# Patient Record
Sex: FEMALE | Race: WHITE | NOT HISPANIC OR LATINO | Employment: OTHER | URBAN - METROPOLITAN AREA
[De-identification: names, ages, dates, MRNs, and addresses within clinical notes are randomized per-mention and may not be internally consistent; named-entity substitution may affect disease eponyms.]

---

## 2017-08-17 ENCOUNTER — CONSULT (OUTPATIENT)
Dept: ONCOLOGY | Facility: CLINIC | Age: 62
End: 2017-08-17

## 2017-08-17 VITALS
DIASTOLIC BLOOD PRESSURE: 67 MMHG | HEART RATE: 81 BPM | BODY MASS INDEX: 25.32 KG/M2 | HEIGHT: 60 IN | WEIGHT: 129 LBS | SYSTOLIC BLOOD PRESSURE: 134 MMHG | TEMPERATURE: 97.2 F | RESPIRATION RATE: 16 BRPM

## 2017-08-17 DIAGNOSIS — R79.83 HOMOCYSTEINEMIA: Primary | ICD-10-CM

## 2017-08-17 PROCEDURE — 99204 OFFICE O/P NEW MOD 45 MIN: CPT | Performed by: INTERNAL MEDICINE

## 2017-08-17 RX ORDER — CLONAZEPAM 0.5 MG/1
TABLET ORAL
Refills: 5 | COMMUNITY
Start: 2017-06-07 | End: 2019-05-13

## 2017-08-17 RX ORDER — CALCIUM POLYCARBOPHIL 625 MG 625 MG/1
625 TABLET ORAL DAILY
COMMUNITY
End: 2020-10-14 | Stop reason: HOSPADM

## 2017-08-17 RX ORDER — GABAPENTIN 600 MG/1
600 TABLET ORAL 3 TIMES DAILY
COMMUNITY
End: 2019-05-13

## 2017-08-17 RX ORDER — POLYETHYLENE GLYCOL 3350 17 G/17G
17 POWDER, FOR SOLUTION ORAL DAILY
COMMUNITY

## 2017-08-17 NOTE — PROGRESS NOTES
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  The patient has absolutely no personal history or family history of clotting disorders.  Her homocystine level and her empty HFR mutation were identified several years ago because her daughter was tested and had an MT HFR mutation.  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  She was normal regarding C6 7 7T.  Interpretation by the laboratory with this was performed reinforced the fact that this was not associated with tendency for premature coronary disease or venous thrombosis.  As mentioned patient has never personally experienced any cardiac or peripheral vascular events.  She has no history of venous thrombosis.  Her homocystine level has been elevated.  This was first identified within the past 2 years and she was placed on folic acid.  She was recently switched to an analogue of folic acid,.  She does not have any family history of premature coronary disease stroke or venous thrombosis.  She would like a formal opinion regarding the wrist to her of her elevated homocystine level.  The last time her homocystine level was checked it was 16.9.  That was on May 15, 2017.    Past Medical History:   Diagnosis Date   • Arthritis    • Heart murmur        No current outpatient prescriptions on file prior to visit.     No current facility-administered medications on file prior to visit.      Her current medications include Neurontin 600 mg 3 times daily,    Deplin, 15 mg daily    Chelated  zinc plus copper 15 mg daily, FiberCon daily, Caltrate with vitamin D daily, clonazepam when necessary, MiraLAX and Konsul -dose adjusted      No Known Allergies    Past Surgical History:   Procedure Laterality Date   •   SECTION      x3; , , &    • COLONOSCOPY  2012   • HIP ARTHROPLASTY Right 2016   • TONSILLECTOMY  89       OB History   No data available       Social History     Social History   • Marital status:      Spouse name: N/A   • Number of children: N/A   • Years of education: N/A     Social History Main Topics   • Smoking status: Former Smoker     Packs/day: 0.50     Types: Cigarettes     Quit date:    • Smokeless tobacco: Never Used   • Alcohol use 0.6 oz/week     1 Glasses of wine per week   • Drug use: No   • Sexual activity: Defer     Other Topics Concern   • None     Social History Narrative   • None       Family History   Problem Relation Age of Onset   • Hypertension Mother    • Hypertension Father    • Cancer Father      Bladder   • Colon polyps Father    • Breast cancer Paternal Grandmother        REVIEW OF SYSTEMS:  Review of Systems   Constitutional: Negative for activity change and appetite change.        Patient describes her overall health as good.  She is very physically active.  She walks regularly.  She has been voluntarily losing some weight over the last several months.  She underwent a right hip replacement within the past year and is delighted with results.   HENT: Negative for mouth sores, sinus pressure and voice change.         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   Eyes: Negative for visual disturbance.   Respiratory: Negative for shortness of breath.    Cardiovascular: Negative for chest pain.   Gastrointestinal: Negative for abdominal pain and vomiting.   Genitourinary: Negative for dysuria.   Musculoskeletal: Negative for arthralgias and myalgias.        Right hip replacement as mentioned above   Skin: Negative for color change.   Neurological: Negative for dizziness, syncope and headaches.   Hematological:  "Negative for adenopathy.   Psychiatric/Behavioral: Negative for confusion, sleep disturbance and suicidal ideas. The patient is not nervous/anxious.        .  /67 Comment: PARDEEPE  Pulse 81  Temp 97.2 °F (36.2 °C) (Temporal Artery )   Resp 16  Ht 60\" (152.4 cm)  Wt 129 lb (58.5 kg)  BMI 25.19 kg/m2    Physical  Exam:  Physical Exam   Constitutional: She is oriented to person, place, and time. She appears well-developed and well-nourished. No distress.   Useful vigorous and healthy in appearance   HENT:   Head: Normocephalic.   Mouth/Throat: Oropharynx is clear and moist.   Eyes: Conjunctivae and EOM are normal. No scleral icterus.   Neck: Normal range of motion. Neck supple. No thyromegaly present.   Cardiovascular: Normal rate, regular rhythm and normal heart sounds.    No murmur heard.  Pulmonary/Chest: Effort normal and breath sounds normal. She has no wheezes. She has no rales.   Abdominal: Soft. Bowel sounds are normal. She exhibits no distension and no mass. There is no tenderness.   Musculoskeletal: She exhibits no edema or tenderness.   Lymphadenopathy:     She has no cervical adenopathy.   Neurological: She is alert and oriented to person, place, and time. She displays normal reflexes. No cranial nerve deficit.   Skin: Skin is warm and dry. No rash noted.   Psychiatric: She has a normal mood and affect. Judgment normal.   Vitals reviewed.        Performance Status:     Assessment/Plan     Homocystinemia.  I carefully and clearly indicated the patient that as an isolated phenomenon, this does not increase her likelihood of premature coronary disease stroke or venous thrombosis.  Homocystine levels that are elevated appear to be a marker or at least associated to some degree with patients presenting with the above but certainly do not in a number of clinical studies represent an independent risk factor for development of these issues.  Furthermore, there is absolutely no evidence that currently exist " that intervening with agents like folic acid and decreasing the homocystine level really has any relevant outcome on patients with the above.    I told her that I thought she was entirely healthy and that her genetic mutation and her homocystinemia were not a risk for her.  Despite this, she has taken folic acid for several years and I told her to go ahead and continue it.  She asked me about taking a baby aspirin once a day.  Again I can't say that that would be beneficial for her since there never really been studies that indicate a clear benefit for women who take aspirin but I also told her that I did not see a reason that she could not take a baby aspirin once a day.  She does bruise somewhat easily although not inordinately.  She never has any spontaneous bleeding.  I told her that if she seems to bruise a lot more on the baby aspirin she could call me.  It is a cosmetic result of taking aspirin therapy and I would not discontinue it unless she really found it  unsightly or otherwise bothersome.  I told her that although I'm not a physician that strongly recommends a number of supplements in an otherwise healthy individual, taking a low-dose of aspirin was something I felt most people should do unless they have a strong reason not to.          The patient also asked me about genetic testing.  She was BRCA tested about 10 years ago.  There is a history of breast cancer in her paternal grandmother that was postmenopausal.  There is apparently a history of male breast cancer in that portion of the family as well.  I don't think there are any other individuals who have breast cancer or ovarian cancer or colon cancer.  For the most part, her ancestors have been somewhat long-lived.  On the other hand she is Ashkenazi Faith and I would like input from our genetic counselors.  I will make that referral.  I otherwise asked her to return here on a when necessary basis.           Leonid Estrada,  MD    8/17/2017

## 2017-08-21 DIAGNOSIS — Z80.3 FAMILY HISTORY OF BREAST CANCER: Primary | ICD-10-CM

## 2017-09-28 ENCOUNTER — CLINICAL SUPPORT (OUTPATIENT)
Dept: GENETICS | Facility: HOSPITAL | Age: 62
End: 2017-09-28

## 2017-09-28 ENCOUNTER — LAB (OUTPATIENT)
Dept: LAB | Facility: HOSPITAL | Age: 62
End: 2017-09-28

## 2017-09-28 DIAGNOSIS — IMO0001: ICD-10-CM

## 2017-09-28 DIAGNOSIS — Z83.71 FAMILY HISTORY OF COLONIC POLYPS: ICD-10-CM

## 2017-09-28 DIAGNOSIS — Z13.79 GENETIC TESTING: Primary | ICD-10-CM

## 2017-09-28 DIAGNOSIS — Z80.3 FAMILY HISTORY OF BREAST CANCER: Primary | ICD-10-CM

## 2017-10-01 NOTE — PROGRESS NOTES
Gricelda Matos is a 62 year old female who was referred for genetic counseling due to a family history of breast cancer and Ashkenazi Adventist ancestry. Ms. Matos has no personal history of cancer. She was 13 years old at menarche and had her first child at age 26. She is postmenopausal and retains her uterus and ovaries. Her current cancer screening includes annual clinical breast exam, annual mammogram, and colonoscopy every 5 years. She was interested in discussing her risk for a hereditary cancer syndrome.  Ms. Matos was interested in pursuing a multigene panel, and therefore the CancerNext panel was ordered through [employer] which analyzes 34 genes associated with an increased cancer risk. The genes on this panel include APC, AVERY, BARD1, BMPR1A, BRCA1, BRCA2, BRIP1, CDH1, CDK4, CDKN2A, CHEK2, DICER1, EPCAM, GREM1, HOXB13, MLH1, MRE11A, MSH2, MSH6, MUTYH, NBN, NF1, PALB2, PMS2, POLD1, POLE, PTEN, RAD50, RAD51C, RAD51D, SMAD4, SMARCA4, STK11, and TP53. Results from this testing are expected in approximately 2-3 weeks.    FAMILY HISTORY (see attached pedigree):   Father: Colon polyps & colon resection, 72; Bladder cancer, 82  Pat. Grandmother: Breast cancer, 50s  Mat. Uncle: Bladder cancer  Mat. Grandmother: Lymphoma, 94    We do not have medical records confirming the diagnoses in Ms. Matos’s family.    RISK ASSESSMENT:  Ms. Matos’s family history of breast cancer and Ashkenazi Adventist ancestry led to concern regarding a hereditary cancer syndrome.  She meets NCCN guidelines criteria for testing based on family history. In cases where an affected relative is not available for testing or not willing to pursue testing, it is appropriate to offer testing to an unaffected individual. These risk assessments are based on the family history information provided at the time of the appointment.  The assessments could change in the future should new information be obtained.    GENETIC COUNSELING (60  minutes):  We reviewed the family history information in detail.  Cases of cancer follow three general patterns: sporadic, familial, and hereditary.  While most cancer is sporadic, some cases appear to occur in family clusters.  These cases are said to be familial and account for 10-20% of certain cancer cases.  Familial cases may be due to a combination of shared genes and environmental factors among family members.  In even fewer families, the cancer is said to be inherited, and the genes responsible for the cancer are known.      Family histories typical of hereditary cancer syndromes usually include multiple first- and second-degree relatives diagnosed with cancer types that define a syndrome.  These cases tend to be diagnosed at younger-than-expected ages and can be bilateral or multifocal.  The cancer in these families follows an autosomal dominant inheritance pattern, which indicates the likely presence of a mutation in a cancer susceptibility gene.  Children and siblings of an individual believed to carry this mutation have a 50% chance of inheriting that mutation, thereby inheriting the increased risk to develop cancer.  These mutations can be passed down from the maternal or the paternal lineage.    Based on Ms. Matos’s family history, we discussed that hereditary breast cancer accounts for 5-10% of all cases of breast cancer.  A significant proportion of hereditary breast cancer can be attributed to mutations in the BRCA1 and BRCA2 genes.  Mutations in these genes confer an increased risk for breast cancer, ovarian cancer, male breast cancer, prostate cancer, and pancreatic cancer. Individuals with an Ashkenazi Pentecostalism ancestry have a higher chance to have a mutation in BRCA1 or BRCA2.  Women with a BRCA1 or BRCA2 mutation have up to an 87% lifetime risk of breast cancer and up to a 44% risk of ovarian cancer.     There are other, more rare, hereditary cancer syndromes. Some of these conditions have  well defined cancer risks and established management guidelines.  Other genes that can be tested for have been more recently described, and there may be less data regarding the risks and therefore may not have established management guidelines.  We discussed these limitations at length. Based on Ms. Matos’s family history and her desire to get more information regarding her personal risks she opted to pursue testing through a panel evaluating several other genes known to increase the risk for cancer.    GENETIC TESTING:  The risks, benefits and limitations of genetic testing and implications for clinical management following testing were reviewed. DNA test results can influence decisions regarding screening and prevention.  Genetic testing can have significant psychological implications for both individuals and families. Also discussed was the possibility of employment and insurance discrimination based on genetic test results and the federal and states laws that are in place to prevent this.         We discussed multigene panel testing, which would involve testing BRCA1/2 and an additional 32 genes associated with an increased cancer risk. The benefits and limitations of genetic testing were discussed and Ms. Matos decided to pursue testing of the genes via the panel. The implications of a positive or negative test result were discussed.  We also discussed the importance of testing on an affected relative.  In cases where an affected relative is not available for testing or not willing to pursue testing, it is appropriate to offer testing to an unaffected individual. We discussed the possibility that, in some cases, genetic test results may be ambiguous due to the identification of a genetic variant. These variants may or may not be associated with an increased cancer risk. With multigene panel testing, it is not uncommon for a variant of uncertain significance (VUS) to be identified.  If a VUS is  identified, testing family members is not recommended and screening recommendations are made based on the family history.  The laboratories that perform genetic testing work to reclassify the VUS and send out an amended report if and when a VUS is reclassified.  The majority of variant findings are ultimately reclassified to a negative result. Given her family history, a negative test result does not eliminate all cancer risk, although the risk would not be as high as it would with positive genetic testing. We also discussed that some of the genes on this particular panel have not been well studied yet and there may not be clear implications or guidelines for some of the genes included on this comprehensive panel.    PLAN:  Genetic testing via the CancerNext panel through [facility] was ordered and results are expected in 2-3 weeks. We will contact Ms. Matos with her results once they are received.      Jennifer Gomez MS  Genetic Counselor

## 2017-10-20 ENCOUNTER — DOCUMENTATION (OUTPATIENT)
Dept: GENETICS | Facility: HOSPITAL | Age: 62
End: 2017-10-20

## 2017-10-20 NOTE — PROGRESS NOTES
Gricelda Matos is a 62-year-old female who was referred for genetic counseling due to a family history of breast cancer and Ashkenazi Yazidism ancestry. Ms. Matos has no personal history of cancer. She was 13 years old at menarche and had her first child at age 26. She is postmenopausal and retains her uterus and ovaries. Her current cancer screening includes annual clinical breast exam, annual mammogram, and colonoscopy every 5 years. She was interested in discussing her risk for a hereditary cancer syndrome.  Ms. Matos was interested in pursuing a multigene panel, and therefore the CancerNext panel was ordered through [employer] which analyzes 34 genes associated with an increased cancer risk. The genes on this panel include APC, AVERY, BARD1, BMPR1A, BRCA1, BRCA2, BRIP1, CDH1, CDK4, CDKN2A, CHEK2, DICER1, EPCAM, GREM1, HOXB13, MLH1, MRE11A, MSH2, MSH6, MUTYH, NBN, NF1, PALB2, PMS2, POLD1, POLE, PTEN, RAD50, RAD51C, RAD51D, SMAD4, SMARCA4, STK11, and TP53. Genetic testing was negative by sequencing and rearrangement testing for deleterious mutations in the 34 genes included on the CancerNext panel (see attached results). These normal results were discussed with Ms. Matos by telephone on 10/20/17.    FAMILY HISTORY (see attached pedigree):   Father: Colon polyps & colon resection, 72; Bladder cancer, 82  Pat. Grandmother: Breast cancer, 50s  Mat. Uncle: Bladder cancer  Mat. Grandmother: Lymphoma, 94    We do not have medical records confirming the diagnoses in Ms. Matos’s family.  RISK ASSESSMENT:  Ms. Matos’s family history of breast cancer and Ashkenazi Yazidism ancestry led to concern regarding a hereditary cancer syndrome.  She meets NCCN guidelines criteria for testing based on family history. In cases where an affected relative is not available for testing or not willing to pursue testing, it is appropriate to offer testing to an unaffected individual.    This testing was negative for  deleterious mutations in Ms. Matos, greatly reducing the likelihood for her to have a hereditary cancer syndrome. Based on computer modeling, Ms. Matos’s lifetime risk for breast cancer was estimated to be 10.3% (PAUL), slightly elevated over the 8.8% for the average 61-year-old woman. Per NCCN guidelines, a risk greater than 20% is considered high risk and warrants increased screening, Ms. Matos’s risk does not fall into this category.  This risk assessment is based on the information that was provided during the session and may change if new information is obtained.    GENETIC COUNSELING (60 minutes):  We reviewed the family history information in detail.  Cases of cancer follow three general patterns: sporadic, familial, and hereditary.  While most cancer is sporadic, some cases appear to occur in family clusters.  These cases are said to be familial and account for 10-20% of certain cancer cases.  Familial cases may be due to a combination of shared genes and environmental factors among family members.  In even fewer families, the cancer is said to be inherited, and the genes responsible for the cancer are known.      Family histories typical of hereditary cancer syndromes usually include multiple first- and second-degree relatives diagnosed with cancer types that define a syndrome.  These cases tend to be diagnosed at younger-than-expected ages and can be bilateral or multifocal.  The cancer in these families follows an autosomal dominant inheritance pattern, which indicates the likely presence of a mutation in a cancer susceptibility gene.  Children and siblings of an individual believed to carry this mutation have a 50% chance of inheriting that mutation, thereby inheriting the increased risk to develop cancer.  These mutations can be passed down from the maternal or the paternal lineage.    Based on Ms. Matos’s family history, we discussed that hereditary breast cancer accounts for 5-10% of  all cases of breast cancer.  A significant proportion of hereditary breast cancer can be attributed to mutations in the BRCA1 and BRCA2 genes.  Mutations in these genes confer an increased risk for breast cancer, ovarian cancer, male breast cancer, prostate cancer, and pancreatic cancer. Individuals with an Ashkenazi Latter day ancestry have a higher chance to have a mutation in BRCA1 or BRCA2.  Women with a BRCA1 or BRCA2 mutation have up to an 87% lifetime risk of breast cancer and up to a 44% risk of ovarian cancer.     There are other, more rare, hereditary cancer syndromes. Some of these conditions have well defined cancer risks and established management guidelines.  Other genes that can be tested for have been more recently described, and there may be less data regarding the risks and therefore may not have established management guidelines.  We discussed these limitations at length. Based on Ms. Matos’s family history and her desire to get more information regarding her personal risks she opted to pursue testing through a panel evaluating several other genes known to increase the risk for cancer.    GENETIC TESTING:  The risks, benefits and limitations of genetic testing and implications for clinical management following testing were reviewed. DNA test results can influence decisions regarding screening and prevention.  Genetic testing can have significant psychological implications for both individuals and families. Also discussed was the possibility of employment and insurance discrimination based on genetic test results and the federal and states laws that are in place to prevent this.         We discussed multigene panel testing, which would involve testing BRCA1/2 and an additional 32 genes associated with an increased cancer risk. The benefits and limitations of genetic testing were discussed and Ms. Matos decided to pursue testing of the genes via the panel. The implications of a positive or  negative test result were discussed.  We also discussed the importance of testing on an affected relative.  In cases where an affected relative is not available for testing or not willing to pursue testing, it is appropriate to offer testing to an unaffected individual. We discussed the possibility that, in some cases, genetic test results may be ambiguous due to the identification of a genetic variant. These variants may or may not be associated with an increased cancer risk. With multigene panel testing, it is not uncommon for a variant of uncertain significance (VUS) to be identified.  If a VUS is identified, testing family members is not recommended and screening recommendations are made based on the family history.  The laboratories that perform genetic testing work to reclassify the VUS and send out an amended report if and when a VUS is reclassified.  The majority of variant findings are ultimately reclassified to a negative result. Given her family history, a negative test result does not eliminate all cancer risk, although the risk would not be as high as it would with positive genetic testing. We also discussed that some of the genes on this particular panel have not been well studied yet and there may not be clear implications or guidelines for some of the genes included on this comprehensive panel.    TEST RESULTS:  Genetic testing was negative by sequencing and rearrangement testing for the 34 genes on this panel.  The negative result greatly lowers the risk of a hereditary cancer syndrome for Ms. Matos.  Since an affected individual in the family has not had testing, it is possible that the family history is due to a hereditary cancer syndrome which Ms. Matos did not happen to inherit. This assessment is based on the information provided at the time of the consultation.    CANCER PREVENTION:  Options available to individuals with an elevated lifetime risk for breast cancer were briefly  discussed, including increased surveillance, chemoprevention and prophylactic surgery (mastectomy).  Based on computer modeling, Ms. Matos’s lifetime risk for breast cancer would not be considered “high risk”.  She should follow general population screening guidelines for her breast cancer risk, including annual clinical breast exam, annual mammography, and monthly self-breast exam.      PLAN:  Genetic counseling remains available for Ms. Matos.  If Ms. Matos has any questions, concerns, or updates to the family history she is welcome to call us.      Jennifer Gomez MS  Genetic Counselor    Cc: MD Agata Ty MD Erica Rosenberg

## 2018-02-26 ENCOUNTER — HOSPITAL ENCOUNTER (OUTPATIENT)
Dept: GENERAL RADIOLOGY | Facility: HOSPITAL | Age: 63
Discharge: HOME | End: 2018-02-26
Admitting: NURSE PRACTITIONER

## 2018-02-26 ENCOUNTER — TRANSCRIBE ORDERS (OUTPATIENT)
Dept: ADMINISTRATIVE | Facility: HOSPITAL | Age: 63
End: 2018-02-26

## 2018-02-26 DIAGNOSIS — Z87.891 FORMER SMOKER: Primary | ICD-10-CM

## 2018-02-26 PROCEDURE — 71046 X-RAY EXAM CHEST 2 VIEWS: CPT

## 2018-05-22 ENCOUNTER — TRANSCRIBE ORDERS (OUTPATIENT)
Dept: ADMINISTRATIVE | Facility: HOSPITAL | Age: 63
End: 2018-05-22

## 2018-05-22 DIAGNOSIS — Z13.820 SCREENING FOR OSTEOPOROSIS: Primary | ICD-10-CM

## 2018-06-13 ENCOUNTER — APPOINTMENT (OUTPATIENT)
Dept: BONE DENSITY | Facility: HOSPITAL | Age: 63
End: 2018-06-13
Attending: INTERNAL MEDICINE

## 2018-06-27 ENCOUNTER — HOSPITAL ENCOUNTER (OUTPATIENT)
Dept: BONE DENSITY | Facility: HOSPITAL | Age: 63
Discharge: HOME | End: 2018-06-27
Attending: INTERNAL MEDICINE | Admitting: INTERNAL MEDICINE

## 2018-06-27 DIAGNOSIS — Z13.820 SCREENING FOR OSTEOPOROSIS: ICD-10-CM

## 2018-06-27 PROCEDURE — 77080 DXA BONE DENSITY AXIAL: CPT

## 2019-01-25 PROBLEM — K21.9 GERD (GASTROESOPHAGEAL REFLUX DISEASE): Status: ACTIVE | Noted: 2019-01-25

## 2019-01-25 PROBLEM — D32.0 BENIGN MENINGIOMA OF BRAIN (HCC): Status: ACTIVE | Noted: 2019-01-25

## 2019-01-25 PROBLEM — E55.9 VITAMIN D DEFICIENCY: Status: ACTIVE | Noted: 2019-01-25

## 2019-01-25 PROBLEM — M54.2 CERVICALGIA: Status: ACTIVE | Noted: 2019-01-25

## 2019-01-25 PROBLEM — M25.552 LEFT HIP PAIN: Status: ACTIVE | Noted: 2019-01-25

## 2019-01-25 PROBLEM — K59.01 CONSTIPATION BY DELAYED COLONIC TRANSIT: Status: ACTIVE | Noted: 2019-01-25

## 2019-01-25 PROBLEM — E78.00 HYPERCHOLESTEROLEMIA: Status: ACTIVE | Noted: 2019-01-25

## 2019-01-25 PROBLEM — E60 ZINC DEFICIENCY: Status: ACTIVE | Noted: 2019-01-25

## 2019-02-13 ENCOUNTER — TELEPHONE (OUTPATIENT)
Dept: INTERNAL MEDICINE | Facility: CLINIC | Age: 64
End: 2019-02-13

## 2019-02-13 RX ORDER — LEVOMEFOLATE CALCIUM 15 MG
15 TABLET ORAL DAILY
COMMUNITY
End: 2019-02-13 | Stop reason: SDUPTHER

## 2019-02-13 RX ORDER — LEVOMEFOLATE CALCIUM 15 MG
15 TABLET ORAL DAILY
Qty: 90 TABLET | Refills: 3 | Status: SHIPPED | OUTPATIENT
Start: 2019-02-13 | End: 2020-04-22

## 2019-02-13 NOTE — TELEPHONE ENCOUNTER
Assuming she is taking 1 tablet every evening,decrease to 1/2 tablet every evening for 2 weeks then 1/2 tablet every other  evening for 2 wks, then 1/2 tablet every 3rd evening for 2 wks then stop

## 2019-02-13 NOTE — TELEPHONE ENCOUNTER
I sent the Deplin in and advised that you were out of office and we would get back to her as soon as possible.     Regarding: Prescription Question  ----- Message from Mychart, Generic sent at 2/13/2019  3:16 PM EST -----    Dear Agata, I no longer have the burning tongue issue.  I have totally gotten off of Gabbapentin and I would like to titrate off of Clonazepam .5 mg.  How do you suggest I do this?  I know there are side affects by going cold turkey.    Also, I need a refill order to go to [facility] for Deplin.  Do you mind sending them the information they need?    Thanks so much.  Chaka Matos

## 2019-02-20 ENCOUNTER — LAB (OUTPATIENT)
Dept: LAB | Facility: HOSPITAL | Age: 64
End: 2019-02-20

## 2019-02-20 ENCOUNTER — TRANSCRIBE ORDERS (OUTPATIENT)
Dept: LAB | Facility: HOSPITAL | Age: 64
End: 2019-02-20

## 2019-02-20 DIAGNOSIS — L08.0 PYODERMA: Primary | ICD-10-CM

## 2019-02-20 DIAGNOSIS — L08.0 PYODERMA: ICD-10-CM

## 2019-02-20 PROCEDURE — 87070 CULTURE OTHR SPECIMN AEROBIC: CPT | Performed by: DERMATOLOGY

## 2019-02-20 PROCEDURE — 87205 SMEAR GRAM STAIN: CPT | Performed by: DERMATOLOGY

## 2019-02-22 LAB
BACTERIA SPEC AEROBE CULT: ABNORMAL
GRAM STN SPEC: ABNORMAL

## 2019-04-29 PROBLEM — B35.1 ONYCHOMYCOSIS: Status: ACTIVE | Noted: 2019-04-29

## 2019-04-29 PROBLEM — K14.0 GLOSSITIS: Status: ACTIVE | Noted: 2019-04-29

## 2019-04-29 PROBLEM — M79.604 LEG PAIN, RIGHT: Status: ACTIVE | Noted: 2019-04-29

## 2019-04-29 PROBLEM — Z87.891 FORMER SMOKER: Status: ACTIVE | Noted: 2019-04-29

## 2019-04-29 PROBLEM — M25.551 BILATERAL HIP PAIN: Status: ACTIVE | Noted: 2019-04-29

## 2019-04-29 PROBLEM — J30.9 ALLERGIC RHINITIS: Status: ACTIVE | Noted: 2019-04-29

## 2019-04-29 PROBLEM — G47.00 INSOMNIA: Status: ACTIVE | Noted: 2019-04-29

## 2019-04-29 PROBLEM — N83.209 OVARIAN CYST: Status: ACTIVE | Noted: 2019-04-29

## 2019-04-29 PROBLEM — E53.8 FOLATE DEFICIENCY: Status: ACTIVE | Noted: 2019-04-29

## 2019-04-29 PROBLEM — M25.552 BILATERAL HIP PAIN: Status: ACTIVE | Noted: 2019-04-29

## 2019-04-29 PROBLEM — R05.8 NOCTURNAL COUGH: Status: ACTIVE | Noted: 2019-04-29

## 2019-04-29 PROBLEM — Z78.0 MENOPAUSE: Status: ACTIVE | Noted: 2019-04-29

## 2019-05-07 ENCOUNTER — TELEPHONE (OUTPATIENT)
Dept: INTERNAL MEDICINE | Facility: CLINIC | Age: 64
End: 2019-05-07

## 2019-05-07 DIAGNOSIS — E55.9 VITAMIN D DEFICIENCY: ICD-10-CM

## 2019-05-07 DIAGNOSIS — R79.83 HOMOCYSTEINEMIA: ICD-10-CM

## 2019-05-07 DIAGNOSIS — E78.00 HYPERCHOLESTEROLEMIA: Primary | ICD-10-CM

## 2019-05-07 DIAGNOSIS — K14.6 BURNING MOUTH SYNDROME: ICD-10-CM

## 2019-05-07 DIAGNOSIS — E53.8 FOLATE DEFICIENCY: ICD-10-CM

## 2019-05-07 DIAGNOSIS — E60 ZINC DEFICIENCY: ICD-10-CM

## 2019-05-07 NOTE — TELEPHONE ENCOUNTER
PATIENT WOULD LIKE TO HAVE LABS DRAWN TOMORROW AD WOULD LIKE HER B6, B12, ZINC, HOMOCYSTINE CHECKED

## 2019-05-08 ENCOUNTER — LAB (OUTPATIENT)
Dept: LAB | Facility: HOSPITAL | Age: 64
End: 2019-05-08

## 2019-05-08 DIAGNOSIS — E53.8 FOLATE DEFICIENCY: ICD-10-CM

## 2019-05-08 DIAGNOSIS — K14.6 BURNING MOUTH SYNDROME: ICD-10-CM

## 2019-05-08 DIAGNOSIS — E60 ZINC DEFICIENCY: ICD-10-CM

## 2019-05-08 DIAGNOSIS — E55.9 VITAMIN D DEFICIENCY: ICD-10-CM

## 2019-05-08 DIAGNOSIS — E78.00 HYPERCHOLESTEROLEMIA: ICD-10-CM

## 2019-05-08 LAB
25(OH)D3 SERPL-MCNC: 54.3 NG/ML (ref 30–100)
ALBUMIN SERPL-MCNC: 4.6 G/DL (ref 3.5–5.2)
ALBUMIN/GLOB SERPL: 1.7 G/DL
ALP SERPL-CCNC: 70 U/L (ref 39–117)
ALT SERPL W P-5'-P-CCNC: 19 U/L (ref 1–33)
ANION GAP SERPL CALCULATED.3IONS-SCNC: 13.8 MMOL/L
AST SERPL-CCNC: 23 U/L (ref 1–32)
BASOPHILS # BLD AUTO: 0.04 10*3/MM3 (ref 0–0.2)
BASOPHILS NFR BLD AUTO: 0.8 % (ref 0–1.5)
BILIRUB SERPL-MCNC: 0.5 MG/DL (ref 0.2–1.2)
BUN BLD-MCNC: 15 MG/DL (ref 8–23)
BUN/CREAT SERPL: 21.7 (ref 7–25)
CALCIUM SPEC-SCNC: 9.7 MG/DL (ref 8.6–10.5)
CHLORIDE SERPL-SCNC: 100 MMOL/L (ref 98–107)
CHOLEST SERPL-MCNC: 221 MG/DL (ref 0–200)
CLUMPED PLATELETS: PRESENT
CO2 SERPL-SCNC: 24.2 MMOL/L (ref 22–29)
CREAT BLD-MCNC: 0.69 MG/DL (ref 0.57–1)
DEPRECATED RDW RBC AUTO: 44.8 FL (ref 37–54)
EOSINOPHIL # BLD AUTO: 0 10*3/MM3 (ref 0–0.4)
EOSINOPHIL NFR BLD AUTO: 0 % (ref 0.3–6.2)
ERYTHROCYTE [DISTWIDTH] IN BLOOD BY AUTOMATED COUNT: 13.4 % (ref 12.3–15.4)
GFR SERPL CREATININE-BSD FRML MDRD: 86 ML/MIN/1.73
GLOBULIN UR ELPH-MCNC: 2.7 GM/DL
GLUCOSE BLD-MCNC: 86 MG/DL (ref 65–99)
HCT VFR BLD AUTO: 43.5 % (ref 34–46.6)
HCYS SERPL-MCNC: 12.9 UMOL/L (ref 0–15)
HDLC SERPL-MCNC: 81 MG/DL (ref 40–60)
HGB BLD-MCNC: 14 G/DL (ref 12–15.9)
LDLC SERPL CALC-MCNC: 127 MG/DL (ref 0–100)
LDLC/HDLC SERPL: 1.57 {RATIO}
LYMPHOCYTES # BLD AUTO: 1.73 10*3/MM3 (ref 0.7–3.1)
LYMPHOCYTES NFR BLD AUTO: 33.1 % (ref 19.6–45.3)
MCH RBC QN AUTO: 29 PG (ref 26.6–33)
MCHC RBC AUTO-ENTMCNC: 32.2 G/DL (ref 31.5–35.7)
MCV RBC AUTO: 90.2 FL (ref 79–97)
MONOCYTES # BLD AUTO: 0.39 10*3/MM3 (ref 0.1–0.9)
MONOCYTES NFR BLD AUTO: 7.5 % (ref 5–12)
NEUTROPHILS # BLD AUTO: 3.06 10*3/MM3 (ref 1.7–7)
NEUTROPHILS NFR BLD AUTO: 58.4 % (ref 42.7–76)
PLATELET # BLD AUTO: 142 10*3/MM3 (ref 140–450)
PMV BLD AUTO: 13.5 FL (ref 6–12)
POTASSIUM BLD-SCNC: 4.1 MMOL/L (ref 3.5–5.2)
PROT SERPL-MCNC: 7.3 G/DL (ref 6–8.5)
RBC # BLD AUTO: 4.82 10*6/MM3 (ref 3.77–5.28)
RBC MORPH BLD: NORMAL
SODIUM BLD-SCNC: 138 MMOL/L (ref 136–145)
TRIGL SERPL-MCNC: 65 MG/DL (ref 0–150)
TSH SERPL DL<=0.05 MIU/L-ACNC: 1.03 MIU/ML (ref 0.27–4.2)
VIT B12 BLD-MCNC: 800 PG/ML (ref 211–946)
VLDLC SERPL-MCNC: 13 MG/DL (ref 5–40)
WBC MORPH BLD: NORMAL
WBC NRBC COR # BLD: 5.23 10*3/MM3 (ref 3.4–10.8)

## 2019-05-08 PROCEDURE — 84630 ASSAY OF ZINC: CPT

## 2019-05-08 PROCEDURE — 80061 LIPID PANEL: CPT

## 2019-05-08 PROCEDURE — 84207 ASSAY OF VITAMIN B-6: CPT

## 2019-05-08 PROCEDURE — 85025 COMPLETE CBC W/AUTO DIFF WBC: CPT

## 2019-05-08 PROCEDURE — 84443 ASSAY THYROID STIM HORMONE: CPT

## 2019-05-08 PROCEDURE — 82607 VITAMIN B-12: CPT

## 2019-05-08 PROCEDURE — 83090 ASSAY OF HOMOCYSTEINE: CPT

## 2019-05-08 PROCEDURE — 82306 VITAMIN D 25 HYDROXY: CPT

## 2019-05-08 PROCEDURE — 85007 BL SMEAR W/DIFF WBC COUNT: CPT

## 2019-05-08 PROCEDURE — 80053 COMPREHEN METABOLIC PANEL: CPT

## 2019-05-10 LAB — ZINC SERPL-MCNC: 100 UG/DL (ref 56–134)

## 2019-05-11 LAB — VIT B6 SERPL-MCNC: 19.1 UG/L (ref 2–32.8)

## 2019-05-13 ENCOUNTER — OFFICE VISIT (OUTPATIENT)
Dept: INTERNAL MEDICINE | Facility: CLINIC | Age: 64
End: 2019-05-13

## 2019-05-13 VITALS
BODY MASS INDEX: 25.4 KG/M2 | WEIGHT: 126 LBS | HEIGHT: 59 IN | SYSTOLIC BLOOD PRESSURE: 120 MMHG | HEART RATE: 80 BPM | DIASTOLIC BLOOD PRESSURE: 74 MMHG

## 2019-05-13 DIAGNOSIS — E60 ZINC DEFICIENCY: ICD-10-CM

## 2019-05-13 DIAGNOSIS — Z01.419 PAP TEST, AS PART OF ROUTINE GYNECOLOGICAL EXAMINATION: ICD-10-CM

## 2019-05-13 DIAGNOSIS — F51.01 PRIMARY INSOMNIA: ICD-10-CM

## 2019-05-13 DIAGNOSIS — K59.01 CONSTIPATION BY DELAYED COLONIC TRANSIT: ICD-10-CM

## 2019-05-13 DIAGNOSIS — D32.0 BENIGN MENINGIOMA OF BRAIN (HCC): ICD-10-CM

## 2019-05-13 DIAGNOSIS — K21.9 GASTROESOPHAGEAL REFLUX DISEASE WITHOUT ESOPHAGITIS: ICD-10-CM

## 2019-05-13 DIAGNOSIS — E78.00 HYPERCHOLESTEROLEMIA: Primary | ICD-10-CM

## 2019-05-13 DIAGNOSIS — M25.552 PAIN OF LEFT HIP JOINT: ICD-10-CM

## 2019-05-13 DIAGNOSIS — Z12.89 ENCOUNTER FOR PELVIC SCREENING FOR CANCER: ICD-10-CM

## 2019-05-13 DIAGNOSIS — K14.6 BURNING MOUTH SYNDROME: ICD-10-CM

## 2019-05-13 DIAGNOSIS — E55.9 VITAMIN D DEFICIENCY: ICD-10-CM

## 2019-05-13 DIAGNOSIS — R79.83 HOMOCYSTEINEMIA: ICD-10-CM

## 2019-05-13 PROCEDURE — 99396 PREV VISIT EST AGE 40-64: CPT | Performed by: INTERNAL MEDICINE

## 2019-05-13 PROCEDURE — 93000 ELECTROCARDIOGRAM COMPLETE: CPT | Performed by: INTERNAL MEDICINE

## 2019-05-13 RX ORDER — ZOLPIDEM TARTRATE 5 MG/1
TABLET ORAL
COMMUNITY
Start: 2018-02-21 | End: 2019-05-13 | Stop reason: SDUPTHER

## 2019-05-13 RX ORDER — TRIAMCINOLONE ACETONIDE 5 MG/G
OINTMENT TOPICAL
Refills: 1 | COMMUNITY
Start: 2019-04-08 | End: 2020-10-14 | Stop reason: HOSPADM

## 2019-05-13 RX ORDER — OCTISALATE, AVOBENZONE, HOMOSALATE, AND OCTOCRYLENE 29.4; 29.4; 49; 25.48 MG/ML; MG/ML; MG/ML; MG/ML
LOTION TOPICAL
COMMUNITY
End: 2020-10-14 | Stop reason: HOSPADM

## 2019-05-13 RX ORDER — CALCIUM POLYCARBOPHIL 625 MG
625 TABLET ORAL DAILY
COMMUNITY
Start: 2016-04-01

## 2019-05-13 RX ORDER — MELOXICAM 15 MG/1
TABLET ORAL
Refills: 2 | COMMUNITY
Start: 2019-04-04 | End: 2019-06-18

## 2019-05-13 RX ORDER — TERBINAFINE HYDROCHLORIDE 250 MG/1
TABLET ORAL
Refills: 5 | COMMUNITY
Start: 2019-03-19 | End: 2020-02-04

## 2019-05-13 RX ORDER — ZOLPIDEM TARTRATE 5 MG/1
5 TABLET ORAL NIGHTLY PRN
Qty: 10 TABLET | Refills: 0 | Status: SHIPPED | OUTPATIENT
Start: 2019-05-13 | End: 2020-01-30 | Stop reason: SDUPTHER

## 2019-05-13 NOTE — PATIENT INSTRUCTIONS
For hypercholesterolemia, we reviewed her lab results with LDL mildly elevated but an excellent HDL to balance it out.  Continue regular exercise and low-fat and low sugar diet.    For constipation, continue using MiraLAX daily.  Drink plenty of fluids.    For left hip pain, continue taking meloxicam with food as needed up until 1 week before the surgery.  Then just use Tylenol until surgery.  Continue cold packs as needed.  Proceed with total hip replacement by Dr. Bernal in [place] on 6/4/2019.    For insomnia, continue using Ambien low-dose as needed.  Relax and read before bedtime every night.  Avoid the TV, phone, iPad, computer close to bedtime.  Getting daily exercise also helps a sleep.    For homocystinemia, continue taking l-methylfolate daily.    For vitamin D deficiency, continue current dose of vitamin D and calcium.    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  She is now off of gabapentin and clonazepam.  Continue to avoid acidic foods and drinks.    For any symptoms of GERD and acid reflux, may take over-the-counter Pepcid or Zantac as needed.    Zinc level is now within normal limits.  Vitamin B6 is also within normal limits.Serving Sizes  A serving size is a measured amount of food or drink, such as one slice of bread, that has an associated nutrient content. Knowing the serving size of a food or drink can help you determine how much of that food you should consume.  What is the size of one serving?  The size of one healthy serving depends on the food or drink. To determine a serving size, read the food label. If the food or drink does not have a food label, try to find serving size information online. Or, use the following to estimate the size of one adult serving:  Grain  1 slice bread. ½ bagel. ½ cup pasta.  Vegetable  ½ cup cooked or canned vegetables. 1 cup raw, leafy greens.  Fruit  ½ cup canned fruit. 1 medium fruit. ¼ cup dried fruit.  Meat and Other Protein Sources  1 oz meat,  poultry, or fish. ¼ cup cooked beans. 1 egg. ¼ cup nuts or seeds. 1 Tbsp nut butter. ¼ cup tofu or tempeh. 2 Tbsp hummus.  Dairy  An individual container of yogurt (6-8 oz). 1 piece of cheese the size of your thumb (1 oz). 1 cup (8 oz) milk or milk alternative.  Fat  A piece the size of one dice. 1 tsp soft margarine. 1 Tbsp mayonnaise. 1 tsp vegetable oil. 1 Tbsp regular salad dressing. 2 Tbsp low-fat salad dressing.  How many servings should I eat from each food group each day?  The following are the suggested number of servings to try and have every day from each food group. You can also look at your eating throughout the week and aim for meeting these requirements on most days for overall healthy eating.  Grain  6-8 servings. Try to have half of your grains from whole grains, such as whole wheat bread, corn tortillas, oatmeal, brown rice, whole wheat pasta, and bulgur.  Vegetable  At least 2½-3 servings.  Fruit  2 servings.  Meat and Other Protein Foods  5-6 servings. Aim to have lean proteins, such as chicken, turkey, fish, beans, or tofu.  Dairy  3 servings. Choose low-fat or nonfat if you are trying to control your weight.  Fat  2-3 servings.  Is a serving the same thing as a portion?  No. A portion is the actual amount you eat, which may be more than one serving. Knowing the specific serving size of a food and the nutritional information that goes with it can help you make a healthy decision on what size portion to eat.  What are some tips to help me learn healthy serving sizes?  · Check food labels for serving sizes. Many foods that come as a single portion actually contain multiple servings.  · Determine the serving size of foods you commonly eat and figure out how large a portion you usually eat.  · Measure the number of servings that can be held by the bowls, glasses, cups, and plates you typically use. For example, pour your breakfast cereal into your regular bowl and then pour it into a measuring  cup.  · For 1-2 days, measure the serving sizes of all the foods you eat.  · Practice estimating serving sizes and determining how big your portions should be.  This information is not intended to replace advice given to you by your health care provider. Make sure you discuss any questions you have with your health care provider.  Document Released: 09/15/2004 Document Revised: 08/12/2017 Document Reviewed: 03/17/2015  [employer] Interactive Patient Education © 2018 [employer]    Total Hip Replacement  Total hip replacement is a surgery to remove damaged bone in your hip joint and replace it with an artificial (prosthetic) hip joint. The hip is a ball-and-socket type of joint. It has two main parts. The ball part of the joint (femoral head) is the top of the thighbone (femur). The socket part of the joint is a large, hollow area on the outer side of your pelvis (acetabulum) where the femur and pelvis meet.  During total hip replacement, one or both parts of the hip joint are replaced, depending on the type of joint damage that you have. The purpose of this surgery is to reduce pain and improve your hip function.  Tell a health care provider about:  · Any allergies you have.  · All medicines you are taking, including vitamins, herbs, eye drops, creams, and over-the-counter medicines.  · Any problems you or family members have had with anesthetic medicines.  · Any blood disorders you have.  · Any surgeries you have had.  · Any medical conditions you have.  · Whether you are pregnant or may be pregnant.  What are the risks?  Generally, this is a safe procedure. However, problems may occur, including:  · Infection.  · Bleeding.  · Allergic reactions to medicines.  · Damage to nerves or other structures.  · Dislocation of the prosthetic joint (prosthesis).  · Loosening of the prosthesis.  · Fracture of the bone.  · A blood clot, which can break loose and travel to your lungs (pulmonary embolus).  · Compartment  syndrome.  · Deep vein thrombosis.    What happens before the procedure?  Staying hydrated  Follow instructions from your health care provider about hydration, which may include:  · Up to 2 hours before the procedure - you may continue to drink clear liquids, such as water, clear fruit juice, black coffee, and plain tea.    Eating and drinking restrictions  · Follow instructions from your health care provider about eating and drinking, which may include:  ? 8 hours before the procedure - stop eating heavy meals or foods such as meat, fried foods, or fatty foods.  ? 6 hours before the procedure - stop eating light meals or foods, such as toast or cereal.  ? 6 hours before the procedure - stop drinking milk or drinks that contain milk.  ? 2 hours before the procedure - stop drinking clear liquids.  Medicines  · Ask your health care provider about:  ? Changing or stopping your regular medicines. This is especially important if you are taking diabetes medicines or blood thinners.  ? Taking medicines such as aspirin and ibuprofen. These medicines can thin your blood. Do not take these medicines unless your health care provider tells you to take them.  ? Taking over-the-counter medicines, vitamins, herbs, or supplements.  General instructions  · You may have a physical exam.  · You may have testing, such as:  ? X-rays or an MRI.  ? Blood or urine tests.  · Plan to have someone take you home from the hospital or clinic.  · Plan to have a responsible adult care for you for at least 24 hours after you leave the hospital or clinic. This is important.  · Prepare your home so you can be safe and have easy access to what you need.  · To avoid the risk of infection, have routine teeth cleanings or any dental work done well in advance of your surgery or wait until several weeks after your surgery.  · Avoid shaving your legs just before surgery. If any shaving is needed, it will be done in the hospital.  · Ask your health care  provider how your surgical site will be marked or identified.  What happens during the procedure?  · To lower your risk of infection:  ? Your health care team will wash or sanitize their hands.  ? Hair may be removed from the surgical area.  ? Your skin will be washed with soap.  · An IV will be inserted into one of your veins.  · You will be given one or more of the following:  ? A medicine to help you relax (sedative).  ? A medicine to make you fall asleep (general anesthetic).  ? A medicine that is injected into your spine to numb the area below and slightly above the injection site (spinal anesthetic).  · An incision will be made so the surgeon can see the bones and tissue in your hip. The location of the incision will depend on the approach used by the surgeon:  ? Posterior approach. The incision will be at the back of the hip.  ? Anterior approach. The incision will be at the front of the hip.  · Then, your surgeon will:  ? Use his or her hands to move your hip out of position (dislocate it).  ? Cut and remove damaged pieces of bone and cartilage.  ? Insert a prosthetic ball and socket into the hip joint.  ? Secure the ball and socket in the hip joint.  ? Do an X-ray of the hip joint to confirm proper placement.  ? Place a drain to remove excess fluid, if needed.  ? Close the incision and apply a bandage (dressing) over the surgical site.  The procedure may vary among health care providers and hospitals.  What happens after the procedure?  · Your blood pressure, heart rate, breathing rate, and blood oxygen level will be monitored until the medicines you were given have worn off.  · Your neurovascular status will be monitored.  · You will be given pain medicine.  · You may have to wear compression stockings. These help to prevent blood clots and reduce swelling in your legs. You may also be given a blood-thinning (anticoagulant) medicine.  · You will receive physical therapy until your health care provider  feels it is safe for you to go home. You may need to use a walker or crutches.  · If you had the posterior approach, you may have to use a wedge (hip abduction) pillow when you are in bed.  ? This pillow will protect your hip from dislocation by keeping it straight and will prevent your legs from turning inward or away from your body.  Summary  · Total hip replacement is a surgery to remove damaged bone in your hip joint and replace it with an artificial (prosthetic) hip joint.  · Before the procedure, follow instructions from your health care provider about eating and drinking.  · Plan to have someone take you home from the hospital or clinic.  · After the surgery, you may have to wear compression stockings. These help to prevent blood clots and reduce swelling in your legs.  This information is not intended to replace advice given to you by your health care provider. Make sure you discuss any questions you have with your health care provider.  Document Released: 03/26/2002 Document Revised: 09/12/2018 Document Reviewed: 09/12/2018  [employer] Interactive Patient Education © 2019 [employer]

## 2019-05-13 NOTE — PROGRESS NOTES
QUICK REFERENCE INFORMATION:  The ABCs of the Annual Wellness Visit    Annual Wellness visit    HEALTH RISK ASSESSMENT    1955    Recent History  No hospitalization(s) within the last year..        Current Medical Providers:  Patient Care Team:  Agata Miner MD as PCP - General  Agata Miner MD as PCP - Family Medicine        Smoking Status:  Social History     Tobacco Use   Smoking Status Former Smoker   • Packs/day: 0.50   • Types: Cigarettes   • Last attempt to quit:    • Years since quittin.4   Smokeless Tobacco Never Used   Tobacco Comment    Quit        Alcohol Consumption:  Social History     Substance and Sexual Activity   Alcohol Use Yes   • Alcohol/week: 0.6 oz   • Types: 1 Glasses of wine per week       Depression Screen:   PHQ-2/PHQ-9 Depression Screening 2019   Little interest or pleasure in doing things 0   Feeling down, depressed, or hopeless 0   Total Score 0       Health Habits:    She exercises regularly and eats a low-fat healthy diet.          Recent Lab Results:  CMP:  Lab Results   Component Value Date    BUN 15 2019    CREATININE 0.69 2019    EGFRIFNONA 86 2019    BCR 21.7 2019     2019    K 4.1 2019    CO2 24.2 2019    CALCIUM 9.7 2019    ALBUMIN 4.60 2019    BILITOT 0.5 2019    ALKPHOS 70 2019    AST 23 2019    ALT 19 2019     Lipid Panel:  Lab Results   Component Value Date    CHOL 221 (H) 2019    TRIG 65 2019    HDL 81 (H) 2019    VLDL 13 2019    LDLHDL 1.57 2019     HbA1c:           Age-appropriate Screening Schedule:  Refer to the list below for future screening recommendations based on patient's age, sex and/or medical conditions. Orders for these recommended tests are listed in the plan section. The patient has been provided with a written plan.    Age appropriate preventive counseling done including age appropriate vaccines,regular   Mammogram and self breast exam, pap smear, colonoscopy, regular dental visits, mental health, injury prevention such as wearing seat belt and preventing falls, healthy  nutrition, healthy weight, regular physical exercise. Alcohol use is moderate.  Tobacco history-none. Drug use-none.  STD's-not at risk.        Health Maintenance   Topic Date Due   • ZOSTER VACCINE (2 of 3) 06/28/2010   • PAP SMEAR  08/17/2017   • INFLUENZA VACCINE  08/01/2019   • MAMMOGRAM  11/08/2019   • LIPID PANEL  05/08/2020   • COLONOSCOPY  09/27/2027   • TDAP/TD VACCINES (3 - Td) 10/08/2028        Subjective   History of Present Illness    Gricelda Matos is a 63 y.o. female who presents for an Annual Wellness Visit and follow-up of chronic conditions including hypercholesterolemia, hip pain, constipation, GERD,.    CHRONIC CONDITIONS    For  hypercholesterolemia, she is eating a low-fat and low sugar diet.  She is exercising regularly.  We reviewed her LDL of 127 and her excellent high HDL.    Constipation has improved with using MiraLAX every day.    Left hip pain has progressively gotten worse.  Meloxicam does help the pain some.  Cold pack helps.  She had a complicated and long recovery from the replacement on her right hip.  She has seen Dr. Bernal in [place].  He was recommended by several of her friends.  She plans to have left hip replacement on 6/4/2019.    Insomnia is some better.  She does not need Ambien all the time.  It does help a lot when she needs it.  No side effects.    She is taking l-methylfolate every day.  Homocystine level on recent labs is good.    Vitamin D deficiency, she is taking vitamin D daily and also taking calcium.    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  She still has some symptoms.  She has weaned off of gabapentin and clonazepam.  She feels she is doing okay.    GERD is acting up only occasionally.  She has tried some Zantac as needed and it worked fine.  Rare symptoms.    She has not been  taking zinc and her level is now within normal limits.  Vitamin B6 which had been elevated in the past is now normal.      The following portions of the patient's history were reviewed and updated as appropriate: allergies, current medications, past family history, past medical history, past social history, past surgical history and problem list.    Outpatient Medications Prior to Visit   Medication Sig Dispense Refill   • Calcium Carbonate-Vitamin D (CALTRATE 600+D PO) Take  by mouth.     • calcium polycarbophil (FIBERCON) 625 MG tablet Take 625 mg by mouth Daily.     • calcium polycarbophil (KONSYL FIBER) 625 MG tablet Konsyl Fiber   Daily     • GLUCOSAMINE-CHONDROIT-VIT C-MN PO Take 2 capsules by mouth Daily.     • l-methylfolate 15 MG tablet tablet Take 1 tablet by mouth Daily. 90 tablet 3   • meloxicam (MOBIC) 15 MG tablet   2   • mupirocin (BACTROBAN) 2 % ointment   0   • Polyethylene Glycol 3350 (MIRALAX PO) Take  by mouth.     • Probiotic Product (ALIGN) 4 MG capsule Take by mouth.     • terbinafine (lamiSIL) 250 MG tablet Take for 1 week every other month  5   • triamcinolone (KENALOG) 0.5 % ointment   1   • TURMERIC PO Tumeric     • CHELATED ZINC PO Take  by mouth.     • zolpidem (AMBIEN) 5 MG tablet Take by mouth.     • clonazePAM (KlonoPIN) 0.5 MG tablet   5   • gabapentin (NEURONTIN) 600 MG tablet Take 600 mg by mouth 3 (Three) Times a Day.       No facility-administered medications prior to visit.        Patient Active Problem List   Diagnosis   • Homocysteinemia (CMS/HCC)   • GERD (gastroesophageal reflux disease)   • Burning mouth syndrome   • Vitamin D deficiency   • Cervicalgia   • Hypercholesterolemia   • Zinc deficiency   • Left hip pain   • Constipation by delayed colonic transit   • Benign meningioma of brain (CMS/HCC)   • Leg pain, right   • Allergic rhinitis   • Bilateral hip pain   • Folate deficiency   • Former smoker   • Glossitis   • Insomnia   • Menopause   • Nocturnal cough   •  Onychomycosis   • Ovarian cyst   • Pain of left hip joint   • Pap test, as part of routine gynecological examination       Advance Care Planning:  Patient has an advance directive - a copy has not been provided. Have asked the patient to send this to us to add to record    Identification of Risk Factors:  Risk factors include: osteoarthritis of hip.    Review of Systems   Constitutional: Negative for chills, fatigue and fever.   HENT: Negative for congestion, ear pain, sinus pressure and sore throat.    Eyes: Negative for visual disturbance.   Respiratory: Negative for cough, chest tightness, shortness of breath and wheezing.    Cardiovascular: Negative for chest pain, palpitations and leg swelling.   Gastrointestinal: Negative for abdominal pain, blood in stool and constipation.   Endocrine: Negative for cold intolerance, heat intolerance and polydipsia.   Genitourinary: Negative for dysuria, frequency and hematuria.   Musculoskeletal: Positive for arthralgias. Negative for back pain, gait problem and joint swelling.   Skin: Negative for color change.   Allergic/Immunologic: Negative for environmental allergies, food allergies and immunocompromised state.   Neurological: Negative for dizziness, speech difficulty and headaches.   Hematological: Negative for adenopathy. Does not bruise/bleed easily.   Psychiatric/Behavioral: Negative for confusion, dysphoric mood, sleep disturbance and suicidal ideas. The patient is not nervous/anxious.        Compared to one year ago, the patient feels her physical health is the same.  Compared to one year ago, the patient feels her mental health is the same.    Objective     Physical Exam   Constitutional: She is oriented to person, place, and time. She appears well-developed and well-nourished.   HENT:   Head: Normocephalic.   Eyes: Conjunctivae and EOM are normal. Pupils are equal, round, and reactive to light.   Neck: Normal range of motion. Neck supple. No thyromegaly present.    Cardiovascular: Normal rate, regular rhythm, normal heart sounds and intact distal pulses.   Pulmonary/Chest: Effort normal and breath sounds normal. She has no wheezes. Right breast exhibits no inverted nipple, no mass, no nipple discharge, no skin change and no tenderness. Left breast exhibits no inverted nipple, no mass, no nipple discharge, no skin change and no tenderness.   Abdominal: Soft. Bowel sounds are normal. There is no tenderness. Hernia confirmed negative in the right inguinal area and confirmed negative in the left inguinal area.   Genitourinary: Uterus normal. Rectal exam shows no external hemorrhoid, no internal hemorrhoid, no fissure, no mass and guaiac negative stool. Pelvic exam was performed with patient supine. There is no rash, tenderness, lesion or injury on the right labia. There is no rash, tenderness, lesion or injury on the left labia. Cervix exhibits no discharge and no friability. Right adnexum displays no mass, no tenderness and no fullness. Left adnexum displays no mass, no tenderness and no fullness. No erythema, tenderness or bleeding in the vagina. No signs of injury around the vagina. No vaginal discharge found.   Musculoskeletal: Normal range of motion. She exhibits no edema.        Left hip: She exhibits tenderness. She exhibits no deformity.   Status post right hip replacement   Lymphadenopathy:        Head (right side): No submental, no submandibular, no preauricular and no posterior auricular adenopathy present.        Head (left side): No submental, no submandibular, no preauricular and no posterior auricular adenopathy present.     She has no cervical adenopathy.     She has no axillary adenopathy. No inguinal adenopathy noted on the right or left side.   Neurological: She is alert and oriented to person, place, and time. She has normal strength. No cranial nerve deficit or sensory deficit. Coordination and gait normal.   Skin: Skin is warm and dry. No rash noted.  "  Psychiatric: She has a normal mood and affect. Her speech is normal and behavior is normal. Judgment and thought content normal. Cognition and memory are normal.   Nursing note and vitals reviewed.          ECG 12 Lead  Date/Time: 5/13/2019 1:36 PM  Performed by: Agata Miner MD  Authorized by: Agata Miner MD   Comparison: compared with previous ECG from 5/13/2018  Similar to previous ECG  Rhythm: sinus rhythm  Rate: normal  BPM: 76  Conduction: conduction normal  ST Segments: ST segments normal  T Waves: T waves normal  QRS axis: normal    Clinical impression: normal ECG            Vitals:    05/13/19 1158   BP: 120/74   BP Location: Left arm   Patient Position: Sitting   Cuff Size: Adult   Pulse: 80   Weight: 57.2 kg (126 lb)   Height: 149.9 cm (59\")       Patient's Body mass index is 25.45 kg/m². BMI is within normal parameters. No follow-up required..      CMP:  Lab Results   Component Value Date    BUN 15 05/08/2019    CREATININE 0.69 05/08/2019    EGFRIFNONA 86 05/08/2019    BCR 21.7 05/08/2019     05/08/2019    K 4.1 05/08/2019    CO2 24.2 05/08/2019    CALCIUM 9.7 05/08/2019    ALBUMIN 4.60 05/08/2019    BILITOT 0.5 05/08/2019    ALKPHOS 70 05/08/2019    AST 23 05/08/2019    ALT 19 05/08/2019     HbA1c:  No results found for: HGBA1C  Microalbumin:  No results found for: MICROALBUR, POCMALB, POCCREAT  Lipid Panel  Lab Results   Component Value Date    CHOL 221 (H) 05/08/2019    TRIG 65 05/08/2019    HDL 81 (H) 05/08/2019     (H) 05/08/2019    AST 23 05/08/2019    ALT 19 05/08/2019       Assessment/Plan   Patient Self-Management and Personalized Health Advice  The patient has been provided with information about: prevention of cardiac or vascular disease and preventive services including:   · Fall Risk assessment done.  Not at high risk for falls.    Problem List Items Addressed This Visit        Cardiovascular and Mediastinum    Hypercholesterolemia - Primary    Relevant Orders    " ECG 12 Lead (Completed)       Digestive    GERD (gastroesophageal reflux disease)    Vitamin D deficiency    Zinc deficiency    Constipation by delayed colonic transit       Nervous and Auditory    Benign meningioma of brain (CMS/HCC)    Pain of left hip joint       Other    Homocysteinemia (CMS/HCC)    Burning mouth syndrome    Insomnia    Relevant Medications    zolpidem (AMBIEN) 5 MG tablet    Pap test, as part of routine gynecological examination      Other Visit Diagnoses     Encounter for pelvic screening for cancer               Patient Instructions   For hypercholesterolemia, we reviewed her lab results with LDL mildly elevated but an excellent HDL to balance it out.  Continue regular exercise and low-fat and low sugar diet.    For constipation, continue using MiraLAX daily.  Drink plenty of fluids.    For left hip pain, continue taking meloxicam with food as needed up until 1 week before the surgery.  Then just use Tylenol until surgery.  Continue cold packs as needed.  Proceed with total hip replacement by Dr. Bernal in [place] on 6/4/2019.    For insomnia, continue using Ambien low-dose as needed.  Relax and read before bedtime every night.  Avoid the TV, phone, iPad, computer close to bedtime.  Getting daily exercise also helps a sleep.    For homocystinemia, continue taking l-methylfolate daily.    For vitamin D deficiency, continue current dose of vitamin D and calcium.    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  She is now off of gabapentin and clonazepam.  Continue to avoid acidic foods and drinks.    For any symptoms of GERD and acid reflux, may take over-the-counter Pepcid or Zantac as needed.    Zinc level is now within normal limits.  Vitamin B6 is also within normal limits.Serving Sizes  A serving size is a measured amount of food or drink, such as one slice of bread, that has an associated nutrient content. Knowing the serving size of a food or drink can help you determine how much  of that food you should consume.  What is the size of one serving?  The size of one healthy serving depends on the food or drink. To determine a serving size, read the food label. If the food or drink does not have a food label, try to find serving size information online. Or, use the following to estimate the size of one adult serving:  Grain  1 slice bread. ½ bagel. ½ cup pasta.  Vegetable  ½ cup cooked or canned vegetables. 1 cup raw, leafy greens.  Fruit  ½ cup canned fruit. 1 medium fruit. ¼ cup dried fruit.  Meat and Other Protein Sources  1 oz meat, poultry, or fish. ¼ cup cooked beans. 1 egg. ¼ cup nuts or seeds. 1 Tbsp nut butter. ¼ cup tofu or tempeh. 2 Tbsp hummus.  Dairy  An individual container of yogurt (6-8 oz). 1 piece of cheese the size of your thumb (1 oz). 1 cup (8 oz) milk or milk alternative.  Fat  A piece the size of one dice. 1 tsp soft margarine. 1 Tbsp mayonnaise. 1 tsp vegetable oil. 1 Tbsp regular salad dressing. 2 Tbsp low-fat salad dressing.  How many servings should I eat from each food group each day?  The following are the suggested number of servings to try and have every day from each food group. You can also look at your eating throughout the week and aim for meeting these requirements on most days for overall healthy eating.  Grain  6-8 servings. Try to have half of your grains from whole grains, such as whole wheat bread, corn tortillas, oatmeal, brown rice, whole wheat pasta, and bulgur.  Vegetable  At least 2½-3 servings.  Fruit  2 servings.  Meat and Other Protein Foods  5-6 servings. Aim to have lean proteins, such as chicken, turkey, fish, beans, or tofu.  Dairy  3 servings. Choose low-fat or nonfat if you are trying to control your weight.  Fat  2-3 servings.  Is a serving the same thing as a portion?  No. A portion is the actual amount you eat, which may be more than one serving. Knowing the specific serving size of a food and the nutritional information that goes with it  can help you make a healthy decision on what size portion to eat.  What are some tips to help me learn healthy serving sizes?  · Check food labels for serving sizes. Many foods that come as a single portion actually contain multiple servings.  · Determine the serving size of foods you commonly eat and figure out how large a portion you usually eat.  · Measure the number of servings that can be held by the bowls, glasses, cups, and plates you typically use. For example, pour your breakfast cereal into your regular bowl and then pour it into a measuring cup.  · For 1-2 days, measure the serving sizes of all the foods you eat.  · Practice estimating serving sizes and determining how big your portions should be.  This information is not intended to replace advice given to you by your health care provider. Make sure you discuss any questions you have with your health care provider.  Document Released: 09/15/2004 Document Revised: 08/12/2017 Document Reviewed: 03/17/2015  [employer] Interactive Patient Education © 2018 [employer]    Total Hip Replacement  Total hip replacement is a surgery to remove damaged bone in your hip joint and replace it with an artificial (prosthetic) hip joint. The hip is a ball-and-socket type of joint. It has two main parts. The ball part of the joint (femoral head) is the top of the thighbone (femur). The socket part of the joint is a large, hollow area on the outer side of your pelvis (acetabulum) where the femur and pelvis meet.  During total hip replacement, one or both parts of the hip joint are replaced, depending on the type of joint damage that you have. The purpose of this surgery is to reduce pain and improve your hip function.  Tell a health care provider about:  · Any allergies you have.  · All medicines you are taking, including vitamins, herbs, eye drops, creams, and over-the-counter medicines.  · Any problems you or family members have had with anesthetic medicines.  · Any blood  disorders you have.  · Any surgeries you have had.  · Any medical conditions you have.  · Whether you are pregnant or may be pregnant.  What are the risks?  Generally, this is a safe procedure. However, problems may occur, including:  · Infection.  · Bleeding.  · Allergic reactions to medicines.  · Damage to nerves or other structures.  · Dislocation of the prosthetic joint (prosthesis).  · Loosening of the prosthesis.  · Fracture of the bone.  · A blood clot, which can break loose and travel to your lungs (pulmonary embolus).  · Compartment syndrome.  · Deep vein thrombosis.    What happens before the procedure?  Staying hydrated  Follow instructions from your health care provider about hydration, which may include:  · Up to 2 hours before the procedure - you may continue to drink clear liquids, such as water, clear fruit juice, black coffee, and plain tea.    Eating and drinking restrictions  · Follow instructions from your health care provider about eating and drinking, which may include:  ? 8 hours before the procedure - stop eating heavy meals or foods such as meat, fried foods, or fatty foods.  ? 6 hours before the procedure - stop eating light meals or foods, such as toast or cereal.  ? 6 hours before the procedure - stop drinking milk or drinks that contain milk.  ? 2 hours before the procedure - stop drinking clear liquids.  Medicines  · Ask your health care provider about:  ? Changing or stopping your regular medicines. This is especially important if you are taking diabetes medicines or blood thinners.  ? Taking medicines such as aspirin and ibuprofen. These medicines can thin your blood. Do not take these medicines unless your health care provider tells you to take them.  ? Taking over-the-counter medicines, vitamins, herbs, or supplements.  General instructions  · You may have a physical exam.  · You may have testing, such as:  ? X-rays or an MRI.  ? Blood or urine tests.  · Plan to have someone take  you home from the hospital or clinic.  · Plan to have a responsible adult care for you for at least 24 hours after you leave the hospital or clinic. This is important.  · Prepare your home so you can be safe and have easy access to what you need.  · To avoid the risk of infection, have routine teeth cleanings or any dental work done well in advance of your surgery or wait until several weeks after your surgery.  · Avoid shaving your legs just before surgery. If any shaving is needed, it will be done in the hospital.  · Ask your health care provider how your surgical site will be marked or identified.  What happens during the procedure?  · To lower your risk of infection:  ? Your health care team will wash or sanitize their hands.  ? Hair may be removed from the surgical area.  ? Your skin will be washed with soap.  · An IV will be inserted into one of your veins.  · You will be given one or more of the following:  ? A medicine to help you relax (sedative).  ? A medicine to make you fall asleep (general anesthetic).  ? A medicine that is injected into your spine to numb the area below and slightly above the injection site (spinal anesthetic).  · An incision will be made so the surgeon can see the bones and tissue in your hip. The location of the incision will depend on the approach used by the surgeon:  ? Posterior approach. The incision will be at the back of the hip.  ? Anterior approach. The incision will be at the front of the hip.  · Then, your surgeon will:  ? Use his or her hands to move your hip out of position (dislocate it).  ? Cut and remove damaged pieces of bone and cartilage.  ? Insert a prosthetic ball and socket into the hip joint.  ? Secure the ball and socket in the hip joint.  ? Do an X-ray of the hip joint to confirm proper placement.  ? Place a drain to remove excess fluid, if needed.  ? Close the incision and apply a bandage (dressing) over the surgical site.  The procedure may vary among health  care providers and hospitals.  What happens after the procedure?  · Your blood pressure, heart rate, breathing rate, and blood oxygen level will be monitored until the medicines you were given have worn off.  · Your neurovascular status will be monitored.  · You will be given pain medicine.  · You may have to wear compression stockings. These help to prevent blood clots and reduce swelling in your legs. You may also be given a blood-thinning (anticoagulant) medicine.  · You will receive physical therapy until your health care provider feels it is safe for you to go home. You may need to use a walker or crutches.  · If you had the posterior approach, you may have to use a wedge (hip abduction) pillow when you are in bed.  ? This pillow will protect your hip from dislocation by keeping it straight and will prevent your legs from turning inward or away from your body.  Summary  · Total hip replacement is a surgery to remove damaged bone in your hip joint and replace it with an artificial (prosthetic) hip joint.  · Before the procedure, follow instructions from your health care provider about eating and drinking.  · Plan to have someone take you home from the hospital or clinic.  · After the surgery, you may have to wear compression stockings. These help to prevent blood clots and reduce swelling in your legs.  This information is not intended to replace advice given to you by your health care provider. Make sure you discuss any questions you have with your health care provider.  Document Released: 03/26/2002 Document Revised: 09/12/2018 Document Reviewed: 09/12/2018  [employer] Interactive Patient Education © 2019 [employer]        Outpatient Encounter Medications as of 5/13/2019   Medication Sig Dispense Refill   • Calcium Carbonate-Vitamin D (CALTRATE 600+D PO) Take  by mouth.     • calcium polycarbophil (FIBERCON) 625 MG tablet Take 625 mg by mouth Daily.     • calcium polycarbophil (KONSYL FIBER) 625 MG tablet  Konsyl Fiber   Daily     • GLUCOSAMINE-CHONDROIT-VIT C-MN PO Take 2 capsules by mouth Daily.     • l-methylfolate 15 MG tablet tablet Take 1 tablet by mouth Daily. 90 tablet 3   • meloxicam (MOBIC) 15 MG tablet   2   • mupirocin (BACTROBAN) 2 % ointment   0   • Polyethylene Glycol 3350 (MIRALAX PO) Take  by mouth.     • Probiotic Product (ALIGN) 4 MG capsule Take by mouth.     • terbinafine (lamiSIL) 250 MG tablet Take for 1 week every other month  5   • triamcinolone (KENALOG) 0.5 % ointment   1   • TURMERIC PO Tumeric     • zolpidem (AMBIEN) 5 MG tablet Take 1 tablet by mouth At Night As Needed for Sleep. 10 tablet 0   • [DISCONTINUED] CHELATED ZINC PO Take  by mouth.     • [DISCONTINUED] zolpidem (AMBIEN) 5 MG tablet Take by mouth.     • [DISCONTINUED] clonazePAM (KlonoPIN) 0.5 MG tablet   5   • [DISCONTINUED] gabapentin (NEURONTIN) 600 MG tablet Take 600 mg by mouth 3 (Three) Times a Day.       No facility-administered encounter medications on file as of 5/13/2019.        Reviewed use of high risk medication in the elderly: not applicable  Reviewed for potential of harmful drug interactions in the elderly: not applicable    Follow Up:  Return for Recheck after hip surgery.     An After Visit Summary and PPPS with all of these plans were given to the patient.           Note: Part of this note may be an electronic transcription/translation of spoken language to printed text using the Dragon Dictation System.

## 2019-05-20 DIAGNOSIS — Z01.811 PRE-OP CHEST EXAM: Primary | ICD-10-CM

## 2019-05-21 DIAGNOSIS — Z01.419 PAP TEST, AS PART OF ROUTINE GYNECOLOGICAL EXAMINATION: ICD-10-CM

## 2019-05-22 ENCOUNTER — HOSPITAL ENCOUNTER (OUTPATIENT)
Dept: GENERAL RADIOLOGY | Facility: HOSPITAL | Age: 64
Discharge: HOME | End: 2019-05-22
Admitting: INTERNAL MEDICINE

## 2019-05-22 DIAGNOSIS — Z01.811 PRE-OP CHEST EXAM: ICD-10-CM

## 2019-05-22 PROCEDURE — 71046 X-RAY EXAM CHEST 2 VIEWS: CPT

## 2019-05-30 ENCOUNTER — TELEPHONE (OUTPATIENT)
Dept: INTERNAL MEDICINE | Facility: CLINIC | Age: 64
End: 2019-05-30

## 2019-05-30 DIAGNOSIS — Z01.818 PRE-OP TESTING: Primary | ICD-10-CM

## 2019-05-30 NOTE — TELEPHONE ENCOUNTER
CALLED AND AND STATED SHE NEEDS AN ORDER FOR A UA TO BE DONE ON Monday SHE HAS SURGERY ON Tuesday. HER CALL BACK NUMBER [phone]

## 2019-06-03 ENCOUNTER — LAB (OUTPATIENT)
Dept: LAB | Facility: HOSPITAL | Age: 64
End: 2019-06-03

## 2019-06-03 DIAGNOSIS — L08.0 PYODERMA: Primary | ICD-10-CM

## 2019-06-03 LAB
BACTERIA UR QL AUTO: NORMAL /HPF
BILIRUB UR QL STRIP: NEGATIVE
CLARITY UR: CLEAR
COLOR UR: NORMAL
GLUCOSE UR STRIP-MCNC: NEGATIVE MG/DL
HGB UR QL STRIP.AUTO: NEGATIVE
HYALINE CASTS UR QL AUTO: NORMAL /LPF
KETONES UR QL STRIP: NEGATIVE
LEUKOCYTE ESTERASE UR QL STRIP.AUTO: NEGATIVE
NITRITE UR QL STRIP: NEGATIVE
PH UR STRIP.AUTO: 6 [PH] (ref 5–8)
PROT UR QL STRIP: NEGATIVE
RBC # UR: NORMAL /HPF
REF LAB TEST METHOD: NORMAL
SP GR UR STRIP: <=1.005 (ref 1–1.03)
SQUAMOUS #/AREA URNS HPF: NORMAL /HPF
UROBILINOGEN UR QL STRIP: NORMAL
WBC UR QL AUTO: NORMAL /HPF

## 2019-06-03 PROCEDURE — 81001 URINALYSIS AUTO W/SCOPE: CPT

## 2019-06-07 ENCOUNTER — TELEPHONE (OUTPATIENT)
Dept: INTERNAL MEDICINE | Facility: CLINIC | Age: 64
End: 2019-06-07

## 2019-06-07 NOTE — TELEPHONE ENCOUNTER
PT CALLED STATING THAT SHE HAS AN ISSUE WITH CONSTIPATION SINCE HER HIP REPLACEMENT SURGERY ON 6/4/19 AND WANTED TO GET IT ADDRESSED BEFORE THE WEEKEND

## 2019-06-07 NOTE — TELEPHONE ENCOUNTER
Suggest taking miralx 17 grams daily and can repeat once a day if not improved. This is over the counter

## 2019-06-12 DIAGNOSIS — E78.00 HYPERCHOLESTEROLEMIA: Primary | ICD-10-CM

## 2019-06-12 DIAGNOSIS — D64.9 POSTOPERATIVE ANEMIA: ICD-10-CM

## 2019-06-17 ENCOUNTER — LAB (OUTPATIENT)
Dept: LAB | Facility: HOSPITAL | Age: 64
End: 2019-06-17

## 2019-06-17 DIAGNOSIS — D64.9 POSTOPERATIVE ANEMIA: ICD-10-CM

## 2019-06-17 LAB
ALBUMIN SERPL-MCNC: 4.4 G/DL (ref 3.5–5.2)
ALBUMIN/GLOB SERPL: 1.4 G/DL
ALP SERPL-CCNC: 84 U/L (ref 39–117)
ALT SERPL W P-5'-P-CCNC: 17 U/L (ref 1–33)
ANION GAP SERPL CALCULATED.3IONS-SCNC: 12.6 MMOL/L
AST SERPL-CCNC: 18 U/L (ref 1–32)
BASOPHILS # BLD AUTO: 0.09 10*3/MM3 (ref 0–0.2)
BASOPHILS NFR BLD AUTO: 1.1 % (ref 0–1.5)
BILIRUB SERPL-MCNC: 0.2 MG/DL (ref 0.2–1.2)
BUN BLD-MCNC: 18 MG/DL (ref 8–23)
BUN/CREAT SERPL: 22.5 (ref 7–25)
CALCIUM SPEC-SCNC: 10 MG/DL (ref 8.6–10.5)
CHLORIDE SERPL-SCNC: 100 MMOL/L (ref 98–107)
CO2 SERPL-SCNC: 27.4 MMOL/L (ref 22–29)
CREAT BLD-MCNC: 0.8 MG/DL (ref 0.57–1)
DEPRECATED RDW RBC AUTO: 45.4 FL (ref 37–54)
EOSINOPHIL # BLD AUTO: 0 10*3/MM3 (ref 0–0.4)
EOSINOPHIL NFR BLD AUTO: 0 % (ref 0.3–6.2)
ERYTHROCYTE [DISTWIDTH] IN BLOOD BY AUTOMATED COUNT: 13.4 % (ref 12.3–15.4)
GFR SERPL CREATININE-BSD FRML MDRD: 73 ML/MIN/1.73
GLOBULIN UR ELPH-MCNC: 3.1 GM/DL
GLUCOSE BLD-MCNC: 82 MG/DL (ref 65–99)
HCT VFR BLD AUTO: 40.2 % (ref 34–46.6)
HGB BLD-MCNC: 12.3 G/DL (ref 12–15.9)
IMM GRANULOCYTES # BLD AUTO: 0.08 10*3/MM3 (ref 0–0.05)
IMM GRANULOCYTES NFR BLD AUTO: 1 % (ref 0–0.5)
LYMPHOCYTES # BLD AUTO: 2.52 10*3/MM3 (ref 0.7–3.1)
LYMPHOCYTES NFR BLD AUTO: 30.1 % (ref 19.6–45.3)
MCH RBC QN AUTO: 28.1 PG (ref 26.6–33)
MCHC RBC AUTO-ENTMCNC: 30.6 G/DL (ref 31.5–35.7)
MCV RBC AUTO: 92 FL (ref 79–97)
MONOCYTES # BLD AUTO: 0.38 10*3/MM3 (ref 0.1–0.9)
MONOCYTES NFR BLD AUTO: 4.5 % (ref 5–12)
NEUTROPHILS # BLD AUTO: 5.3 10*3/MM3 (ref 1.7–7)
NEUTROPHILS NFR BLD AUTO: 63.3 % (ref 42.7–76)
NRBC BLD AUTO-RTO: 0 /100 WBC (ref 0–0.2)
PLATELET # BLD AUTO: 534 10*3/MM3 (ref 140–450)
PMV BLD AUTO: 11.3 FL (ref 6–12)
POTASSIUM BLD-SCNC: 4.4 MMOL/L (ref 3.5–5.2)
PROT SERPL-MCNC: 7.5 G/DL (ref 6–8.5)
RBC # BLD AUTO: 4.37 10*6/MM3 (ref 3.77–5.28)
SODIUM BLD-SCNC: 140 MMOL/L (ref 136–145)
WBC NRBC COR # BLD: 8.37 10*3/MM3 (ref 3.4–10.8)

## 2019-06-17 PROCEDURE — 85025 COMPLETE CBC W/AUTO DIFF WBC: CPT

## 2019-06-17 PROCEDURE — 80053 COMPREHEN METABOLIC PANEL: CPT

## 2019-06-18 ENCOUNTER — OFFICE VISIT (OUTPATIENT)
Dept: INTERNAL MEDICINE | Facility: CLINIC | Age: 64
End: 2019-06-18

## 2019-06-18 VITALS
BODY MASS INDEX: 25.6 KG/M2 | SYSTOLIC BLOOD PRESSURE: 110 MMHG | HEIGHT: 59 IN | WEIGHT: 127 LBS | DIASTOLIC BLOOD PRESSURE: 58 MMHG | HEART RATE: 80 BPM

## 2019-06-18 DIAGNOSIS — K59.03 DRUG INDUCED CONSTIPATION: ICD-10-CM

## 2019-06-18 DIAGNOSIS — M25.552 LEFT HIP PAIN: Primary | ICD-10-CM

## 2019-06-18 DIAGNOSIS — E78.00 HYPERCHOLESTEROLEMIA: ICD-10-CM

## 2019-06-18 DIAGNOSIS — Z96.642 STATUS POST TOTAL HIP REPLACEMENT, LEFT: ICD-10-CM

## 2019-06-18 PROCEDURE — 99214 OFFICE O/P EST MOD 30 MIN: CPT | Performed by: INTERNAL MEDICINE

## 2019-06-18 RX ORDER — IBUPROFEN 200 MG
200 TABLET ORAL EVERY 6 HOURS PRN
COMMUNITY
End: 2020-10-14 | Stop reason: HOSPADM

## 2019-06-18 NOTE — PROGRESS NOTES
Transitional Care Follow Up Visit  Subjective     Gricelda Matos is a 63 y.o. female who presents for a transitional care management visit.    Within 48 business hours after discharge our office contacted her via telephone to coordinate her care and needs.      I reviewed and discussed the details of that call along with the discharge summary, hospital problems, inpatient lab results, inpatient diagnostic studies, and consultation reports with Gricelda.     Current outpatient and discharge medications have been reconciled for the patient.    No flowsheet data found.  Risk for Readmission (LACE) No Data Recorded    History of Present Illness   Course During Hospital Stay: [facility]  admitted on 6/4/2019 for left hip replacement surgery by Dr. Bernal..  Discharged home on same day,6/4/19.    Since Home had PT with caretenders at home.  Dr. CAIN said to just walk after that.  Walking 30 minutes a day.     Off oxycodone now. Taking advil for pain -about 8 a day now.  No stomach upset.      Since home, she has had trouble with constipation.  MiraLAX was prescribed by Dr. Chavarria.  Also used some suppositories at first.     Appetite good. Incision heeling well.     Taking baby aspirin 2 a day to prevent DVT.    Will f/u with Dr. Bernal in 2 wks.          The following portions of the patient's history were reviewed and updated as appropriate: allergies, current medications, past family history, past medical history, past social history, past surgical history and problem list.    Review of Systems   Constitutional: Negative for chills, fatigue and fever.   HENT: Negative for sinus pressure and sore throat.    Respiratory: Negative for cough, shortness of breath and wheezing.    Cardiovascular: Positive for leg swelling. Negative for chest pain and palpitations.   Gastrointestinal: Negative for abdominal pain, blood in stool, constipation and diarrhea.   Genitourinary: Negative for frequency,  hematuria and urgency.   Musculoskeletal: Positive for arthralgias, gait problem and joint swelling.   Skin: Negative for color change and rash.   Neurological: Negative for dizziness and numbness.   Psychiatric/Behavioral: Negative for dysphoric mood and sleep disturbance.       Objective   Physical Exam   Constitutional: She is oriented to person, place, and time. She appears well-developed and well-nourished.   HENT:   Head: Normocephalic.   Eyes: Conjunctivae and EOM are normal. Pupils are equal, round, and reactive to light.   Neck: Normal range of motion. Neck supple. No thyromegaly present.   Cardiovascular: Normal rate, regular rhythm, normal heart sounds and intact distal pulses.   Pulmonary/Chest: Effort normal and breath sounds normal. She has no wheezes.   Musculoskeletal: She exhibits no edema.        Right hip: She exhibits decreased range of motion. She exhibits normal strength and no bony tenderness.        Left hip: She exhibits decreased range of motion, tenderness and swelling.   Incision after left anterior total hip replacement is healing well.  There is no redness.    Right hip has decreased active flexion but normal passive flexion.   Lymphadenopathy:        Head (right side): No submental, no submandibular, no preauricular and no posterior auricular adenopathy present.        Head (left side): No submental, no submandibular, no preauricular and no posterior auricular adenopathy present.     She has no cervical adenopathy.     She has no axillary adenopathy.   Neurological: She is alert and oriented to person, place, and time. Gait abnormal.   Limping slightly and walking with a cane.  Iliac crests seem to be symmetrical.   Skin: Skin is warm and dry.   Psychiatric: She has a normal mood and affect. Thought content normal.   Nursing note and vitals reviewed.      Assessment/Plan   Problems Addressed this Visit        Cardiovascular and Mediastinum    Hypercholesterolemia      Other Visit  Diagnoses     Left hip pain    -  Primary    Status post total hip replacement, left        Drug induced constipation              Status post left total hip replacement from the anterior approach on 6/4/2019 by Dr. Bernal.  Incision is healing well.  Pain is improving.  She has finished therapy.  Continue walking 30 minutes a day as recommended by Dr. Bernal.  Continue taking Advil with food as needed for pain.  Watch for stomach upset.  May supplement with Tylenol as needed.    For constipation, continue taking MiraLAX every day.  Drink plenty of fluids.    Continue to stop the 81 mg aspirin per day to prevent DVT.    Follow-up with Dr. Bernal in 2 weeks as planned.    For hypercholesterolemia, continue to eat a low-fat and low sugar diet.  Continue to gradually increase physical activity and exercise as tolerated.

## 2019-06-19 DIAGNOSIS — E78.00 HYPERCHOLESTEROLEMIA: Primary | ICD-10-CM

## 2019-06-19 DIAGNOSIS — E53.8 FOLATE DEFICIENCY: ICD-10-CM

## 2019-08-23 ENCOUNTER — TELEPHONE (OUTPATIENT)
Dept: INTERNAL MEDICINE | Facility: CLINIC | Age: 64
End: 2019-08-23

## 2019-08-23 DIAGNOSIS — S61.209A OPEN WOUND OF FINGER WITH COMPLICATION, INITIAL ENCOUNTER: Primary | ICD-10-CM

## 2019-08-23 NOTE — TELEPHONE ENCOUNTER
"Spoke with pt and she said the ER called it an \"avulsion of the hand\" on her R thumb. She cut it yesterday while using a mandolin. She needs her bandage changed every day but says she can't do it herself. I spoke with Dr. Miner and she has asked Zachary to contact a surgeon to see if we can get her in today.  "

## 2019-08-23 NOTE — TELEPHONE ENCOUNTER
Patient called and stated she cut her right thumb while cooking last night.  She went to [facility] last night and they bandaged her but were not able to suture due to the location of the laceration.    She was told she would need numerous bandage changes and was placed on Keflex and diflucan.    No available openings.  Advised patient to either go back to [facility] or go to the [facility] across the street for wound follow up and they could access wound care protocol.  Patient's  wanted to speak with Teresa because he was a long term patient of Dr. Bradley however, patient is a patent of Dr. Miner.  Gave info to Dalila to diana.

## 2019-08-23 NOTE — TELEPHONE ENCOUNTER
Plastic surgeon was unable to work the patient in today.  We offered to see the patient in the office this afternoon.  She was worried that we would not be able to change the bandage quick enough because the ER told her it would still be bleeding.  We again recommended the urgent treatment center next door since they are equipped to deal with acute wounds.    We will set her up with the  wound clinic.  They will not be able to see her until Monday.  She will need to go to the [facility] for dressing changes until that time.

## 2019-08-26 ENCOUNTER — TELEPHONE (OUTPATIENT)
Dept: INTERNAL MEDICINE | Facility: CLINIC | Age: 64
End: 2019-08-26

## 2019-08-26 NOTE — TELEPHONE ENCOUNTER
I am not sure on the details about last week but pt did not have appt today. They did have all of her demographics but no appt scheduled. She is scheduled for this Wednesday at 2pm. That was the soonest they had for new pt. I sent pt a message via [employer] letting her know of the apt w/ details and I will also call her.

## 2019-08-26 NOTE — TELEPHONE ENCOUNTER
PT CALLED STATING HER FRIEND WHO IS A NURSE PRACTITIONER DID THE CHANGE ON HER WOUND AND IT IS DOING GREAT   SO SHE WANT NEED WOUND CARE

## 2019-08-26 NOTE — TELEPHONE ENCOUNTER
I prefer that she keep her appointment with wound care just to make sure everything is going fine.  Also if it does start to get worse, we are already hooked into the right people.  Check on the referral that we did on 8/23.  She should have an appointment today   WDL

## 2020-01-30 ENCOUNTER — TELEPHONE (OUTPATIENT)
Dept: INTERNAL MEDICINE | Facility: CLINIC | Age: 65
End: 2020-01-30

## 2020-01-30 DIAGNOSIS — F51.01 PRIMARY INSOMNIA: ICD-10-CM

## 2020-01-30 RX ORDER — ONDANSETRON 4 MG/1
4 TABLET, ORALLY DISINTEGRATING ORAL EVERY 8 HOURS PRN
Qty: 10 TABLET | Refills: 0 | Status: SHIPPED | OUTPATIENT
Start: 2020-01-30 | End: 2021-04-14

## 2020-01-30 RX ORDER — PHENAZOPYRIDINE HYDROCHLORIDE 200 MG/1
200 TABLET, FILM COATED ORAL 3 TIMES DAILY PRN
Qty: 9 TABLET | Refills: 0 | Status: SHIPPED | OUTPATIENT
Start: 2020-01-30 | End: 2020-02-04 | Stop reason: SDUPTHER

## 2020-01-30 RX ORDER — ZOLPIDEM TARTRATE 5 MG/1
5 TABLET ORAL NIGHTLY PRN
Qty: 10 TABLET | Refills: 0 | Status: SHIPPED | OUTPATIENT
Start: 2020-01-30 | End: 2020-10-14 | Stop reason: SDUPTHER

## 2020-01-30 RX ORDER — OSELTAMIVIR PHOSPHATE 75 MG/1
75 CAPSULE ORAL 2 TIMES DAILY
Qty: 10 CAPSULE | Refills: 0 | Status: SHIPPED | OUTPATIENT
Start: 2020-01-30 | End: 2020-10-14

## 2020-01-30 NOTE — TELEPHONE ENCOUNTER
Last seen:6/18/19  Next appt:none on file  Last approved:5/13/19  Ankit due    Pt wants a month supply

## 2020-01-30 NOTE — TELEPHONE ENCOUNTER
[facility] called ([phone]) regarding a RX e-scribed today for Ondansetron 4mg film.  This comes only in brand and the cost is approximately $1,000.    Was this an error or did you want the tablet?  Please advise or resend another order.

## 2020-02-03 ENCOUNTER — TELEPHONE (OUTPATIENT)
Dept: INTERNAL MEDICINE | Facility: CLINIC | Age: 65
End: 2020-02-03

## 2020-02-03 NOTE — TELEPHONE ENCOUNTER
I cannot add anyone today.  Totally full.  See if I have an opening tomorrow, if not have her see [facility]

## 2020-02-03 NOTE — TELEPHONE ENCOUNTER
Regarding: Non-Urgent Medical Question  Contact: [phone]  ----- Message from Angeline Chappell MA sent at 2/3/2020  8:07 AM EST -----       ----- Message from Gricelda Matos to Agata Miner MD sent at 2/3/2020  8:01 AM -----   Joe Parmar  I'm leaving Wednesday morning for [place].  As you might recall, I've had chronic yeast infections over the years.  However, I haven't had one in about 7 years since I became menopausal.  On Saturday, I couldn't tell if I was getting a yeast or bladder infection.  On Sunday I took Monistat 3 at 10 am and the pain and itching stopped, until Sunday evening when I had a glass of wine and birthday cake. I took an additional suppositorylast night but the itching and pain are still present this morning. Dr. Romero prescribed Fluconazole weekly for the fungus toenail he cut off 2 weeks ago and I've taken 2 pills so far.  I'd like to come in today or tomorrow to discuss - I need a plan.  [phone]  Thanks,  Chaka Matos

## 2020-02-04 ENCOUNTER — TELEPHONE (OUTPATIENT)
Dept: INTERNAL MEDICINE | Facility: CLINIC | Age: 65
End: 2020-02-04

## 2020-02-04 ENCOUNTER — LAB (OUTPATIENT)
Dept: LAB | Facility: HOSPITAL | Age: 65
End: 2020-02-04

## 2020-02-04 ENCOUNTER — OFFICE VISIT (OUTPATIENT)
Dept: INTERNAL MEDICINE | Facility: CLINIC | Age: 65
End: 2020-02-04

## 2020-02-04 VITALS
BODY MASS INDEX: 25.2 KG/M2 | WEIGHT: 125 LBS | DIASTOLIC BLOOD PRESSURE: 80 MMHG | SYSTOLIC BLOOD PRESSURE: 112 MMHG | TEMPERATURE: 98.7 F | HEIGHT: 59 IN | HEART RATE: 82 BPM

## 2020-02-04 DIAGNOSIS — F51.01 PRIMARY INSOMNIA: ICD-10-CM

## 2020-02-04 DIAGNOSIS — R30.0 DYSURIA: ICD-10-CM

## 2020-02-04 DIAGNOSIS — N95.2 ATROPHIC VAGINITIS: ICD-10-CM

## 2020-02-04 DIAGNOSIS — N30.01 ACUTE CYSTITIS WITH HEMATURIA: Primary | ICD-10-CM

## 2020-02-04 DIAGNOSIS — B35.1 FUNGAL TOENAIL INFECTION: ICD-10-CM

## 2020-02-04 DIAGNOSIS — E78.00 HYPERCHOLESTEROLEMIA: ICD-10-CM

## 2020-02-04 PROBLEM — S61.209A OPEN WOUND OF FINGER WITH COMPLICATION: Status: RESOLVED | Noted: 2019-08-23 | Resolved: 2020-02-04

## 2020-02-04 LAB
BILIRUB BLD-MCNC: NEGATIVE MG/DL
CLARITY, POC: ABNORMAL
COLOR UR: ABNORMAL
GLUCOSE UR STRIP-MCNC: ABNORMAL MG/DL
HBA1C MFR BLD: 5.43 % (ref 4.8–5.6)
KETONES UR QL: NEGATIVE
LEUKOCYTE EST, POC: ABNORMAL
NITRITE UR-MCNC: POSITIVE MG/ML
PH UR: 5 [PH] (ref 5–8)
PROT UR STRIP-MCNC: ABNORMAL MG/DL
RBC # UR STRIP: ABNORMAL /UL
SP GR UR: 1 (ref 1–1.03)
UROBILINOGEN UR QL: ABNORMAL

## 2020-02-04 PROCEDURE — 87086 URINE CULTURE/COLONY COUNT: CPT

## 2020-02-04 PROCEDURE — 83036 HEMOGLOBIN GLYCOSYLATED A1C: CPT

## 2020-02-04 PROCEDURE — 81003 URINALYSIS AUTO W/O SCOPE: CPT | Performed by: INTERNAL MEDICINE

## 2020-02-04 PROCEDURE — 99214 OFFICE O/P EST MOD 30 MIN: CPT | Performed by: INTERNAL MEDICINE

## 2020-02-04 RX ORDER — FLUCONAZOLE 200 MG/1
200 TABLET ORAL DAILY
Qty: 3 TABLET | Refills: 0 | Status: SHIPPED | OUTPATIENT
Start: 2020-02-04 | End: 2020-02-07 | Stop reason: SDUPTHER

## 2020-02-04 RX ORDER — NITROFURANTOIN 25; 75 MG/1; MG/1
100 CAPSULE ORAL 2 TIMES DAILY
Qty: 14 CAPSULE | Refills: 0 | Status: SHIPPED | OUTPATIENT
Start: 2020-02-04 | End: 2020-10-14

## 2020-02-04 RX ORDER — FLUCONAZOLE 200 MG/1
TABLET ORAL
COMMUNITY
Start: 2020-01-30 | End: 2020-02-04 | Stop reason: SDUPTHER

## 2020-02-04 RX ORDER — PHENAZOPYRIDINE HYDROCHLORIDE 200 MG/1
200 TABLET, FILM COATED ORAL 3 TIMES DAILY PRN
Qty: 9 TABLET | Refills: 0 | Status: SHIPPED | OUTPATIENT
Start: 2020-02-04 | End: 2022-11-21 | Stop reason: SDUPTHER

## 2020-02-04 NOTE — TELEPHONE ENCOUNTER
Received message from lab stating the pt only wanted to do the A1c and no other labs. Please cancel the others.

## 2020-02-04 NOTE — PATIENT INSTRUCTIONS
Patient Instructions   Problem List Items Addressed This Visit        Musculoskeletal and Integument    Fungal toenail infection    Overview     2/4/2020 Agata Miner MD    Continue fluconazole weekly per Dr. Romero.  Follow-up with him as planned.         Relevant Medications    triamcinolone (KENALOG) 0.5 % ointment    mupirocin (BACTROBAN) 2 % ointment    fluconazole (DIFLUCAN) 200 MG tablet       Genitourinary    Atrophic vaginitis    Overview     2/4/2020 Agata Miner MD    Start vaginal estrogen.  May use it at bedtime nightly for 2 weeks then go to just 2-3 nights a week.         Relevant Medications    Estradiol (IMVEXXY MAINTENANCE PACK) 4 MCG insert (Start on 2/5/2020)       Other    Insomnia    Overview     2/4/2020 Agata Miner MD    Continue Ambien as needed for sleep when traveling.    We discussed sleep hygiene including going to bed at the same time and getting up at the same time every day, going to bed early enough to get 7 or 8 hours in bed, reading and relaxing before bedtime, and avoiding the TV, computer, phone, iPad close to bedtime.           Relevant Medications    zolpidem (AMBIEN) 5 MG tablet      Other Visit Diagnoses     Acute cystitis with hematuria    -  Primary    Urine culture sent.  Take Macrobid twice a day for 7 days.  Drink plenty of fluids.    Relevant Medications    Estradiol (IMVEXXY MAINTENANCE PACK) 4 MCG insert (Start on 2/5/2020)    nitrofurantoin, macrocrystal-monohydrate, (MACROBID) 100 MG capsule    phenazopyridine (PYRIDIUM) 200 MG tablet    Dysuria        See above    Relevant Orders    POC Urinalysis Dipstick, Automated (Completed)    Urine Culture - Urine, Urine, Clean Catch

## 2020-02-04 NOTE — PROGRESS NOTES
[facility]     Gricelda Matos  1955   [id number]      Patient Care Team:  Agata Miner MD as PCP - General  Agata Miner MD as PCP - Family Medicine    Chief Complaint   Patient presents with   • Urinary Tract Infection     symptoms started Saturday            HPI  Patient is a 64 y.o. female presents with vaginal itching and burning. Onset of symptoms was abrupt starting 3 days ago.  Chronicity acute. Severity moderate.  Symptoms are associated with burning,itching, frequency or urine,this morning had bladder pain and severe pain when urinated.  Pertinent negatives no fever chills,hemnaturia,vaginal discharge or odor.   Symptoms are aggravated by possibly not drinking enough fluids, although she stepped out of her fluid intake about 3 to 4 days ago.   Symptoms improve with pyridium helped a little Sunday and helped a lot this am..  Context Had URI and took emergenc and zicam and mucinex and thinks she may not have drank enough water. Cough now very mild and other symptoms resolved.     Lots of vaginal dryness    Remembers that sometimes fluconazole has not worked for her vaginal yeast infections in the past.     CHRONIC CONDITIONS  Fungal toenail. Had 2 doses of fluconazole so far.  Dr. Romero prescribed for once a week after removing the right great toenail and culturing it.  Last dose 6 days ago.     Insomnia. Takes ambien only when travel.  No side effects.  It does help a lot.    Past Medical History:   Diagnosis Date   • Abdominal pain 2013    Dr. 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   •  Hormone deficiency     Dr. Rod did hormone testing-she took HRT for about 6 months   • Mitral valve prolapse     Diagnosed when she was younger.  Asymptomatic.  Stress echo done by Dr.Avichai Liang in  was normal.   • OA (osteoarthritis) 2016    severe OA of R hip (L hip mild)   • Open wound of finger with complication 2019   • Ovarian cyst    • Skin cancer     basal cell skin cancers on back   • Spine disorder     Lumbar spine degenerative changes/mild stenosis/moderate facet disease       Past Surgical History:   Procedure Laterality Date   •  SECTION      x3; , , &    • COLONOSCOPY     • COLPOSCOPY  2004    by Dr. Bustos   • ENDOSCOPY      colonoscopy okay    • HIP ARTHROPLASTY Right 2016   • TONSILLECTOMY         Family History   Problem Relation Age of Onset   • Hypertension Mother    • Hypertension Father    • Cancer Father         Bladder   • Colon polyps Father    • Breast cancer Paternal Grandmother    • Colon cancer Maternal Grandmother    • Hypertension Maternal Grandmother    • Coronary artery disease Maternal Grandfather 65         of MI at 65   • Other Daughter 28        MTH-FR gene mutation       Social History     Socioeconomic History   • Marital status:      Spouse name: Not on file   • Number of children: Not on file   • Years of education: Not on file   • Highest education level: Not on file   Tobacco Use   • Smoking status: Former Smoker     Packs/day: 0.50     Types: Cigarettes     Last attempt to quit:      Years since quittin.1   • Smokeless tobacco: Never Used   • Tobacco comment: Quit    Substance and Sexual Activity   • Alcohol use: Yes     Alcohol/week: 1.0 standard drinks     Types: 1 Glasses of wine per week   • Drug use: No   • Sexual activity: Defer       Allergies   Allergen Reactions   • Contrast Dye Hives   • Cyclobenzaprine Unknown (See Comments)     Unknown   • Naproxen Sodium Other (See Comments)      "Excessive bruising   Aleive        Review of Systems:     Review of Systems   Constitutional: Negative for chills, fatigue and fever.   Respiratory: Positive for cough. Negative for shortness of breath and wheezing.    Cardiovascular: Negative for chest pain and palpitations.   Gastrointestinal: Negative for abdominal pain, blood in stool, constipation and diarrhea.   Genitourinary: Positive for dysuria, frequency and urgency. Negative for hematuria and vaginal discharge.       Vital Signs  Vitals:    02/04/20 0839   BP: 112/80   BP Location: Left arm   Patient Position: Sitting   Cuff Size: Adult   Pulse: 82   Temp: 98.7 °F (37.1 °C)   TempSrc: Temporal   Weight: 56.7 kg (125 lb)   Height: 149.9 cm (59\")   PainSc: 0-No pain     Body mass index is 25.25 kg/m².      Current Outpatient Medications:   •  Calcium Carbonate-Vitamin D (CALTRATE 600+D PO), Take  by mouth., Disp: , Rfl:   •  fluconazole (DIFLUCAN) 200 MG tablet, Take 1 tablet by mouth Daily., Disp: 3 tablet, Rfl: 0  •  l-methylfolate 15 MG tablet tablet, Take 1 tablet by mouth Daily., Disp: 90 tablet, Rfl: 3  •  mupirocin (BACTROBAN) 2 % ointment, , Disp: , Rfl: 0  •  phenazopyridine (PYRIDIUM) 200 MG tablet, Take 1 tablet by mouth 3 (Three) Times a Day As Needed for Bladder Spasms., Disp: 9 tablet, Rfl: 0  •  Polyethylene Glycol 3350 (MIRALAX PO), Take  by mouth., Disp: , Rfl:   •  zolpidem (AMBIEN) 5 MG tablet, Take 1 tablet by mouth At Night As Needed for Sleep., Disp: 10 tablet, Rfl: 0  •  calcium polycarbophil (FIBERCON) 625 MG tablet, Take 625 mg by mouth Daily., Disp: , Rfl:   •  calcium polycarbophil (KONSYL FIBER) 625 MG tablet, Konsyl Fiber  Daily, Disp: , Rfl:   •  [START ON 2/5/2020] Estradiol (IMVEXXY MAINTENANCE PACK) 4 MCG insert, Insert 1 capsule into the vagina 3 (Three) Times a Week., Disp: 8 each, Rfl: 11  •  GLUCOSAMINE-CHONDROIT-VIT C-MN PO, Take 2 capsules by mouth Daily., Disp: , Rfl:   •  ibuprofen (ADVIL,MOTRIN) 200 MG tablet, Take " 200 mg by mouth Every 6 (Six) Hours As Needed for Mild Pain ., Disp: , Rfl:   •  nitrofurantoin, macrocrystal-monohydrate, (MACROBID) 100 MG capsule, Take 1 capsule by mouth 2 (Two) Times a Day., Disp: 14 capsule, Rfl: 0  •  ondansetron ODT (ZOFRAN-ODT) 4 MG disintegrating tablet, Take 1 tablet by mouth Every 8 (Eight) Hours As Needed for Nausea or Vomiting., Disp: 10 tablet, Rfl: 0  •  oseltamivir (TAMIFLU) 75 MG capsule, Take 1 capsule by mouth 2 (Two) Times a Day., Disp: 10 capsule, Rfl: 0  •  Probiotic Product (ALIGN) 4 MG capsule, Take by mouth., Disp: , Rfl:   •  triamcinolone (KENALOG) 0.5 % ointment, , Disp: , Rfl: 1  •  TURMERIC PO, Tumeric, Disp: , Rfl:     Physical Exam:    Physical Exam   Constitutional: She is oriented to person, place, and time. She appears well-developed and well-nourished.   HENT:   Head: Normocephalic.   Eyes: Pupils are equal, round, and reactive to light. Conjunctivae and EOM are normal.   Neck: Normal range of motion. Neck supple. No thyromegaly present.   Cardiovascular: Normal rate, regular rhythm, normal heart sounds and intact distal pulses.   Pulmonary/Chest: Effort normal and breath sounds normal.   Musculoskeletal: Normal range of motion. She exhibits no edema.   Lymphadenopathy:     She has no cervical adenopathy.   Neurological: She is alert and oriented to person, place, and time.   Psychiatric: She has a normal mood and affect. Thought content normal.   Nursing note and vitals reviewed.       ACE III MINI        Results Review:    None    CMP:  Lab Results   Component Value Date    BUN 18 06/17/2019    CREATININE 0.80 06/17/2019    EGFRIFNONA 73 06/17/2019    BCR 22.5 06/17/2019     06/17/2019    K 4.4 06/17/2019    CO2 27.4 06/17/2019    CALCIUM 10.0 06/17/2019    ALBUMIN 4.40 06/17/2019    BILITOT 0.2 06/17/2019    ALKPHOS 84 06/17/2019    AST 18 06/17/2019    ALT 17 06/17/2019     HbA1c:  No results found for: HGBA1C  Microalbumin:  No results found for:  MICROALBUR, POCMALB, POCCREAT  Lipid Panel  Lab Results   Component Value Date    CHOL 221 (H) 05/08/2019    TRIG 65 05/08/2019    HDL 81 (H) 05/08/2019     (H) 05/08/2019    AST 18 06/17/2019    ALT 17 06/17/2019       Medication Review: Medications reviewed and noted  Patient Instructions   Problem List Items Addressed This Visit        Musculoskeletal and Integument    Fungal toenail infection    Overview     2/4/2020 Agata Miner MD    Continue fluconazole weekly per Dr. Romero.  Follow-up with him as planned.         Relevant Medications    triamcinolone (KENALOG) 0.5 % ointment    mupirocin (BACTROBAN) 2 % ointment    fluconazole (DIFLUCAN) 200 MG tablet       Genitourinary    Atrophic vaginitis    Overview     2/4/2020 Agata Miner MD    Start vaginal estrogen.  May use it at bedtime nightly for 2 weeks then go to just 2-3 nights a week.         Relevant Medications    Estradiol (IMVEXXY MAINTENANCE PACK) 4 MCG insert (Start on 2/5/2020)       Other    Insomnia    Overview     2/4/2020 Agata Miner MD    Continue Ambien as needed for sleep when traveling.    We discussed sleep hygiene including going to bed at the same time and getting up at the same time every day, going to bed early enough to get 7 or 8 hours in bed, reading and relaxing before bedtime, and avoiding the TV, computer, phone, iPad close to bedtime.           Relevant Medications    zolpidem (AMBIEN) 5 MG tablet      Other Visit Diagnoses     Acute cystitis with hematuria    -  Primary    Urine culture sent.  Take Macrobid twice a day for 7 days.  Drink plenty of fluids.    Relevant Medications    Estradiol (IMVEXXY MAINTENANCE PACK) 4 MCG insert (Start on 2/5/2020)    nitrofurantoin, macrocrystal-monohydrate, (MACROBID) 100 MG capsule    phenazopyridine (PYRIDIUM) 200 MG tablet    Dysuria        See above    Relevant Orders    POC Urinalysis Dipstick, Automated (Completed)    Urine Culture - Urine, Urine, Clean Catch              Diagnosis Plan   1. Acute cystitis with hematuria      Urine culture sent.  Take Macrobid twice a day for 7 days.  Drink plenty of fluids.   2. Dysuria  POC Urinalysis Dipstick, Automated    Urine Culture - Urine, Urine, Clean Catch    See above   3. Atrophic vaginitis  Estradiol (IMVEXXY MAINTENANCE PACK) 4 MCG insert   4. Fungal toenail infection     5. Primary insomnia         Plan of care reviewed with patient at the conclusion of today's visit. Education was provided regarding diagnosis, management, and any prescribed or recommended OTC medications.Patient verbalizes understanding of and agreement with management plan.         Agata Miner MD

## 2020-02-04 NOTE — TELEPHONE ENCOUNTER
[facility] called regarding the RX for the Estradiol (Imvexxy maintenance jackson).  The pharmacist (Nasim) stated they need to first clarify directions because the standard dosing is 1 capsule inserted 2 times a week.    Also they do not have the maintenance jackson in stock.  Need to get clarification on prescription.  Patient states she needs to pick this up tonight. Do you want to use an alternative RX?    Call pharmacy at [phone] ASAP

## 2020-02-05 DIAGNOSIS — N95.2 ATROPHIC VAGINITIS: Primary | ICD-10-CM

## 2020-02-05 RX ORDER — ESTRADIOL 10 UG/1
1 INSERT VAGINAL 2 TIMES WEEKLY
Qty: 8 TABLET | Refills: 5 | Status: SHIPPED | OUTPATIENT
Start: 2020-02-06 | End: 2020-03-05 | Stop reason: SDUPTHER

## 2020-02-05 NOTE — TELEPHONE ENCOUNTER
I sent Yuvafem generic vaginal tablets to [facility].  Call to see if they have it and if not let us know what they do have

## 2020-02-05 NOTE — TELEPHONE ENCOUNTER
Called [facility] pharmacy and spoke to Everardo and he stated that they do have the vagifem in stock.

## 2020-02-06 LAB — BACTERIA SPEC AEROBE CULT: ABNORMAL

## 2020-02-07 RX ORDER — FLUCONAZOLE 200 MG/1
200 TABLET ORAL DAILY
Qty: 7 TABLET | Refills: 0 | Status: SHIPPED | OUTPATIENT
Start: 2020-02-07 | End: 2020-10-14 | Stop reason: HOSPADM

## 2020-03-05 DIAGNOSIS — N95.2 ATROPHIC VAGINITIS: ICD-10-CM

## 2020-03-05 RX ORDER — ESTRADIOL 10 UG/1
1 INSERT VAGINAL 2 TIMES WEEKLY
Qty: 25 TABLET | Refills: 3 | Status: SHIPPED | OUTPATIENT
Start: 2020-03-05 | End: 2020-09-11 | Stop reason: SDUPTHER

## 2020-04-22 RX ORDER — LEVOMEFOLATE CALCIUM 15 MG
TABLET ORAL
Qty: 90 TABLET | Refills: 3 | Status: SHIPPED | OUTPATIENT
Start: 2020-04-22 | End: 2021-04-14 | Stop reason: SDUPTHER

## 2020-07-03 DIAGNOSIS — E53.8 FOLATE DEFICIENCY: ICD-10-CM

## 2020-07-03 DIAGNOSIS — D32.0 BENIGN MENINGIOMA OF BRAIN (HCC): ICD-10-CM

## 2020-07-03 DIAGNOSIS — E78.00 HYPERCHOLESTEROLEMIA: Primary | ICD-10-CM

## 2020-07-03 DIAGNOSIS — R79.83 HOMOCYSTEINEMIA: ICD-10-CM

## 2020-07-03 DIAGNOSIS — E55.9 VITAMIN D DEFICIENCY: ICD-10-CM

## 2020-07-03 DIAGNOSIS — E60 ZINC DEFICIENCY: ICD-10-CM

## 2020-08-24 ENCOUNTER — TELEPHONE (OUTPATIENT)
Dept: INTERNAL MEDICINE | Facility: CLINIC | Age: 65
End: 2020-08-24

## 2020-08-24 NOTE — TELEPHONE ENCOUNTER
PATIENT CALLED AND SCHEDULED HER YEARLY PHYSICAL.  WOULD LIKE TO GET THE LAB ORDERS DONE BEFORE HER APPOINTMENT SO IS REQUESTING ORDERS BE PUT IN.    PLEASE CONTACT PATIENT TO ADVISE.    CALL BACK:  [phone]

## 2020-08-25 NOTE — TELEPHONE ENCOUNTER
Pt notified and verbalized understanding. She is wanting an external order for mamm mailed to her. She is aware you are out of the office and is not in a hurry to receive it.

## 2020-08-25 NOTE — TELEPHONE ENCOUNTER
Called and advised patient.  States she wants to go to the place in [place] she went to before.  She was at [facility] [place], [facility] by [facility].    Gave her the number to call and scheduled screening mamm.  [phone]

## 2020-09-11 ENCOUNTER — PATIENT MESSAGE (OUTPATIENT)
Dept: INTERNAL MEDICINE | Facility: CLINIC | Age: 65
End: 2020-09-11

## 2020-09-11 DIAGNOSIS — N95.2 ATROPHIC VAGINITIS: ICD-10-CM

## 2020-09-11 RX ORDER — ESTRADIOL 10 UG/1
1 INSERT VAGINAL 2 TIMES WEEKLY
Qty: 25 TABLET | Refills: 3 | Status: SHIPPED | OUTPATIENT
Start: 2020-09-14 | End: 2021-04-14 | Stop reason: SDUPTHER

## 2020-09-11 NOTE — TELEPHONE ENCOUNTER
From: Gricelda Matos  To: Agata Miner MD  Sent: 9/11/2020 2:57 PM EDT  Subject: Non-Urgent Medical Question    I was wondering if you could tell me the name of the medical office where I have gotten my mammogram in the past? The last time I had a mammogram in [place] was probably 2016 - I've been getting them in [place] but I'm not going there during Covid.  Thanks so much.  Chaka Matos ([place])

## 2020-09-14 ENCOUNTER — LAB (OUTPATIENT)
Dept: LAB | Facility: HOSPITAL | Age: 65
End: 2020-09-14

## 2020-09-14 DIAGNOSIS — E55.9 VITAMIN D DEFICIENCY: ICD-10-CM

## 2020-09-14 DIAGNOSIS — E78.00 HYPERCHOLESTEROLEMIA: ICD-10-CM

## 2020-09-14 DIAGNOSIS — E60 ZINC DEFICIENCY: ICD-10-CM

## 2020-09-14 DIAGNOSIS — E53.8 FOLATE DEFICIENCY: ICD-10-CM

## 2020-09-14 LAB
25(OH)D3 SERPL-MCNC: 47.1 NG/ML (ref 30–100)
ALBUMIN SERPL-MCNC: 4.7 G/DL (ref 3.5–5.2)
ALBUMIN/GLOB SERPL: 2 G/DL
ALP SERPL-CCNC: 69 U/L (ref 39–117)
ALT SERPL W P-5'-P-CCNC: 24 U/L (ref 1–33)
ANION GAP SERPL CALCULATED.3IONS-SCNC: 11 MMOL/L (ref 5–15)
AST SERPL-CCNC: 26 U/L (ref 1–32)
BASOPHILS # BLD AUTO: 0.04 10*3/MM3 (ref 0–0.2)
BASOPHILS NFR BLD AUTO: 0.7 % (ref 0–1.5)
BILIRUB SERPL-MCNC: 0.5 MG/DL (ref 0–1.2)
BILIRUB UR QL STRIP: NEGATIVE
BUN SERPL-MCNC: 15 MG/DL (ref 8–23)
BUN/CREAT SERPL: 17.2 (ref 7–25)
CALCIUM SPEC-SCNC: 9.4 MG/DL (ref 8.6–10.5)
CHLORIDE SERPL-SCNC: 99 MMOL/L (ref 98–107)
CHOLEST SERPL-MCNC: 218 MG/DL (ref 0–200)
CLARITY UR: CLEAR
CO2 SERPL-SCNC: 28 MMOL/L (ref 22–29)
COLOR UR: YELLOW
CREAT SERPL-MCNC: 0.87 MG/DL (ref 0.57–1)
DEPRECATED RDW RBC AUTO: 40 FL (ref 37–54)
EOSINOPHIL # BLD AUTO: 0 10*3/MM3 (ref 0–0.4)
EOSINOPHIL NFR BLD AUTO: 0 % (ref 0.3–6.2)
ERYTHROCYTE [DISTWIDTH] IN BLOOD BY AUTOMATED COUNT: 12.9 % (ref 12.3–15.4)
GFR SERPL CREATININE-BSD FRML MDRD: 66 ML/MIN/1.73
GLOBULIN UR ELPH-MCNC: 2.3 GM/DL
GLUCOSE SERPL-MCNC: 61 MG/DL (ref 65–99)
GLUCOSE UR STRIP-MCNC: NEGATIVE MG/DL
HBA1C MFR BLD: 4.8 % (ref 4.8–5.6)
HCT VFR BLD AUTO: 40.5 % (ref 34–46.6)
HCYS SERPL-MCNC: 16.9 UMOL/L (ref 0–15)
HDLC SERPL-MCNC: 72 MG/DL (ref 40–60)
HGB BLD-MCNC: 13.8 G/DL (ref 12–15.9)
HGB UR QL STRIP.AUTO: NEGATIVE
IMM GRANULOCYTES # BLD AUTO: 0.01 10*3/MM3 (ref 0–0.05)
IMM GRANULOCYTES NFR BLD AUTO: 0.2 % (ref 0–0.5)
KETONES UR QL STRIP: NEGATIVE
LDLC SERPL CALC-MCNC: 131 MG/DL (ref 0–100)
LDLC/HDLC SERPL: 1.82 {RATIO}
LEUKOCYTE ESTERASE UR QL STRIP.AUTO: NEGATIVE
LYMPHOCYTES # BLD AUTO: 1.94 10*3/MM3 (ref 0.7–3.1)
LYMPHOCYTES NFR BLD AUTO: 31.6 % (ref 19.6–45.3)
MCH RBC QN AUTO: 29.4 PG (ref 26.6–33)
MCHC RBC AUTO-ENTMCNC: 34.1 G/DL (ref 31.5–35.7)
MCV RBC AUTO: 86.4 FL (ref 79–97)
MONOCYTES # BLD AUTO: 0.46 10*3/MM3 (ref 0.1–0.9)
MONOCYTES NFR BLD AUTO: 7.5 % (ref 5–12)
NEUTROPHILS NFR BLD AUTO: 3.69 10*3/MM3 (ref 1.7–7)
NEUTROPHILS NFR BLD AUTO: 60 % (ref 42.7–76)
NITRITE UR QL STRIP: NEGATIVE
NRBC BLD AUTO-RTO: 0 /100 WBC (ref 0–0.2)
PH UR STRIP.AUTO: 7.5 [PH] (ref 5–8)
PLATELET # BLD AUTO: 232 10*3/MM3 (ref 140–450)
PMV BLD AUTO: 12.6 FL (ref 6–12)
POTASSIUM SERPL-SCNC: 4.5 MMOL/L (ref 3.5–5.2)
PROT SERPL-MCNC: 7 G/DL (ref 6–8.5)
PROT UR QL STRIP: NEGATIVE
RBC # BLD AUTO: 4.69 10*6/MM3 (ref 3.77–5.28)
SODIUM SERPL-SCNC: 138 MMOL/L (ref 136–145)
SP GR UR STRIP: 1.01 (ref 1–1.03)
T4 FREE SERPL-MCNC: 1.17 NG/DL (ref 0.93–1.7)
TRIGL SERPL-MCNC: 76 MG/DL (ref 0–150)
TSH SERPL DL<=0.05 MIU/L-ACNC: 1.52 UIU/ML (ref 0.27–4.2)
UROBILINOGEN UR QL STRIP: NORMAL
VIT B12 BLD-MCNC: 750 PG/ML (ref 211–946)
VLDLC SERPL-MCNC: 15.2 MG/DL (ref 5–40)
WBC # BLD AUTO: 6.14 10*3/MM3 (ref 3.4–10.8)

## 2020-09-14 PROCEDURE — 82306 VITAMIN D 25 HYDROXY: CPT

## 2020-09-14 PROCEDURE — 84630 ASSAY OF ZINC: CPT

## 2020-09-14 PROCEDURE — 82607 VITAMIN B-12: CPT

## 2020-09-14 PROCEDURE — 80061 LIPID PANEL: CPT

## 2020-09-14 PROCEDURE — 84439 ASSAY OF FREE THYROXINE: CPT

## 2020-09-14 PROCEDURE — 83036 HEMOGLOBIN GLYCOSYLATED A1C: CPT

## 2020-09-14 PROCEDURE — 83090 ASSAY OF HOMOCYSTEINE: CPT

## 2020-09-14 PROCEDURE — 80053 COMPREHEN METABOLIC PANEL: CPT

## 2020-09-14 PROCEDURE — 85025 COMPLETE CBC W/AUTO DIFF WBC: CPT

## 2020-09-14 PROCEDURE — 84443 ASSAY THYROID STIM HORMONE: CPT

## 2020-09-14 PROCEDURE — 81003 URINALYSIS AUTO W/O SCOPE: CPT

## 2020-09-17 LAB — ZINC SERPL-MCNC: 103 UG/DL (ref 56–134)

## 2020-10-14 ENCOUNTER — OFFICE VISIT (OUTPATIENT)
Dept: INTERNAL MEDICINE | Facility: CLINIC | Age: 65
End: 2020-10-14

## 2020-10-14 VITALS
TEMPERATURE: 97.4 F | OXYGEN SATURATION: 99 % | BODY MASS INDEX: 25.52 KG/M2 | SYSTOLIC BLOOD PRESSURE: 110 MMHG | WEIGHT: 126.6 LBS | DIASTOLIC BLOOD PRESSURE: 64 MMHG | HEART RATE: 76 BPM | HEIGHT: 59 IN

## 2020-10-14 DIAGNOSIS — Z00.00 ANNUAL PHYSICAL EXAM: Primary | ICD-10-CM

## 2020-10-14 DIAGNOSIS — E78.00 HYPERCHOLESTEROLEMIA: ICD-10-CM

## 2020-10-14 DIAGNOSIS — Z13.820 SCREENING FOR OSTEOPOROSIS: ICD-10-CM

## 2020-10-14 DIAGNOSIS — N95.2 ATROPHIC VAGINITIS: ICD-10-CM

## 2020-10-14 DIAGNOSIS — R79.83 HOMOCYSTEINEMIA: ICD-10-CM

## 2020-10-14 DIAGNOSIS — Z12.31 BREAST CANCER SCREENING BY MAMMOGRAM: ICD-10-CM

## 2020-10-14 DIAGNOSIS — F51.01 PRIMARY INSOMNIA: ICD-10-CM

## 2020-10-14 DIAGNOSIS — K21.9 GASTROESOPHAGEAL REFLUX DISEASE WITHOUT ESOPHAGITIS: ICD-10-CM

## 2020-10-14 PROBLEM — B35.1 FUNGAL TOENAIL INFECTION: Status: RESOLVED | Noted: 2019-04-29 | Resolved: 2020-10-14

## 2020-10-14 PROBLEM — R05.8 NOCTURNAL COUGH: Status: RESOLVED | Noted: 2019-04-29 | Resolved: 2020-10-14

## 2020-10-14 PROBLEM — K14.0 GLOSSITIS: Status: RESOLVED | Noted: 2019-04-29 | Resolved: 2020-10-14

## 2020-10-14 PROCEDURE — 99396 PREV VISIT EST AGE 40-64: CPT | Performed by: INTERNAL MEDICINE

## 2020-10-14 PROCEDURE — 99213 OFFICE O/P EST LOW 20 MIN: CPT | Performed by: INTERNAL MEDICINE

## 2020-10-14 PROCEDURE — 93000 ELECTROCARDIOGRAM COMPLETE: CPT | Performed by: INTERNAL MEDICINE

## 2020-10-14 RX ORDER — PRAVASTATIN SODIUM 10 MG
10 TABLET ORAL NIGHTLY
Qty: 90 TABLET | Refills: 1 | Status: SHIPPED | OUTPATIENT
Start: 2020-10-14 | End: 2021-04-14 | Stop reason: SDUPTHER

## 2020-10-14 NOTE — PROGRESS NOTES
Preventive annual physical exam    HEALTH RISK ASSESSMENT    1955    Recent History  No hospitalization(s) within the last year..        Current Medical Providers:  Patient Care Team:  Agata Miner MD as PCP - General  Agata Miner MD as PCP - Family Medicine        Smoking Status:  Social History     Tobacco Use   Smoking Status Former Smoker   • Packs/day: 0.50   • Years: 4.00   • Pack years: 2.00   • Types: Cigarettes   • Quit date:    • Years since quittin.8   Smokeless Tobacco Never Used   Tobacco Comment    Quit        Alcohol Consumption:  Social History     Substance and Sexual Activity   Alcohol Use Yes   • Alcohol/week: 1.0 standard drinks   • Types: 1 Glasses of wine per week    Comment: daILY       Depression Screen:   PHQ-2/PHQ-9 Depression Screening 10/14/2020   Little interest or pleasure in doing things 0   Feeling down, depressed, or hopeless 0   Total Score 0       Health Habits:              Recent Lab Results:  CMP:  Lab Results   Component Value Date    BUN 15 2020    CREATININE 0.87 2020    EGFRIFNONA 66 2020    BCR 17.2 2020     2020    K 4.5 2020    CO2 28.0 2020    CALCIUM 9.4 2020    ALBUMIN 4.70 2020    BILITOT 0.5 2020    ALKPHOS 69 2020    AST 26 2020    ALT 24 2020     Lipid Panel:  Lab Results   Component Value Date    CHOL 218 (H) 2020    TRIG 76 2020    HDL 72 (H) 2020    VLDL 15.2 2020    LDLHDL 1.82 2020     HbA1c:  Lab Results   Component Value Date    HGBA1C 4.80 2020           Age-appropriate Screening Schedule:  Refer to the list below for future screening recommendations based on patient's age, sex and/or medical conditions. Orders for these recommended tests are listed in the plan section. The patient has been provided with a written plan.    Age appropriate preventive counseling done including age appropriate vaccines,regular   Mammogram and self breast exam, pap smear, colonoscopy, regular dental visits, mental health, injury prevention such as wearing seat belt and preventing falls, healthy  nutrition, healthy weight, regular physical exercise. Alcohol use is moderate.  Tobacco history-none. Drug use-none.  STD's-not at risk.          Health Maintenance   Topic Date Due   • MAMMOGRAM  11/08/2019   • DXA SCAN  10/16/2020   • LIPID PANEL  09/14/2021   • PAP SMEAR  05/18/2022   • COLONOSCOPY  09/27/2022   • TDAP/TD VACCINES (3 - Td) 10/08/2028   • INFLUENZA VACCINE  Completed   • ZOSTER VACCINE  Completed        Subjective   History of Present Illness    Gricelda Matos is a 65 y.o. female who presents for an Annual Wellness Visit.    HPI  Insomnia has been worse with election news and Covid. Tries not to take ambien. But it does help a lot. Before only took it when traveling. No side effects from ambien. She would like a few more to use as needed.    HPI  GERD symptoms recurred.  She resumed omeprazole. She has been drinking wine every night since [facility]. She switched to decaf coffee and is eating less chocolate since the last few days.  That and omeprazole seem to be helping.      CHRONIC CONDITIONS    For hypercholesterolemia eats low fat diet.    The following portions of the patient's history were reviewed and updated as appropriate: allergies, current medications, past family history, past medical history, past social history, past surgical history and problem list.    Outpatient Medications Prior to Visit   Medication Sig Dispense Refill   • Calcium Carbonate-Vitamin D (CALTRATE 600+D PO) Take  by mouth.     • calcium polycarbophil (KONSYL FIBER) 625 MG tablet Konsyl Fiber   Daily     • estradiol (VAGIFEM) 10 MCG tablet vaginal tablet Insert 1 tablet into the vagina 2 (Two) Times a Week. 25 tablet 3   • l-methylfolate 15 MG tablet tablet TAKE 1 TABLET BY MOUTH EVERY DAY 90 tablet 3   • mupirocin (BACTROBAN) 2 %  ointment prn  0   • ondansetron ODT (ZOFRAN-ODT) 4 MG disintegrating tablet Take 1 tablet by mouth Every 8 (Eight) Hours As Needed for Nausea or Vomiting. 10 tablet 0   • phenazopyridine (PYRIDIUM) 200 MG tablet Take 1 tablet by mouth 3 (Three) Times a Day As Needed for Bladder Spasms. 9 tablet 0   • Polyethylene Glycol 3350 (MIRALAX PO) Take  by mouth. 3 times a week     • calcium polycarbophil (FIBERCON) 625 MG tablet Take 625 mg by mouth Daily.     • fluconazole (DIFLUCAN) 200 MG tablet Take 1 tablet by mouth Daily. 7 tablet 0   • GLUCOSAMINE-CHONDROIT-VIT C-MN PO Take 2 capsules by mouth Daily.     • ibuprofen (ADVIL,MOTRIN) 200 MG tablet Take 200 mg by mouth Every 6 (Six) Hours As Needed for Mild Pain .     • nitrofurantoin, macrocrystal-monohydrate, (MACROBID) 100 MG capsule Take 1 capsule by mouth 2 (Two) Times a Day. 14 capsule 0   • oseltamivir (TAMIFLU) 75 MG capsule Take 1 capsule by mouth 2 (Two) Times a Day. 10 capsule 0   • Probiotic Product (ALIGN) 4 MG capsule Take by mouth.     • triamcinolone (KENALOG) 0.5 % ointment   1   • TURMERIC PO Tumeric     • zolpidem (AMBIEN) 5 MG tablet Take 1 tablet by mouth At Night As Needed for Sleep. 10 tablet 0     No facility-administered medications prior to visit.        Patient Active Problem List   Diagnosis   • Homocysteinemia (CMS/HCC)   • GERD (gastroesophageal reflux disease)   • Burning mouth syndrome   • Vitamin D deficiency   • Cervicalgia   • Hypercholesterolemia   • Zinc deficiency   • Constipation by delayed colonic transit   • Benign meningioma of brain (CMS/HCC)   • Leg pain, right   • Allergic rhinitis   • Bilateral hip pain   • Folate deficiency   • Former smoker   • Insomnia   • Menopause   • Ovarian cyst   • Pain of left hip joint   • Pap test, as part of routine gynecological examination   • Atrophic vaginitis   • Annual physical exam       Advance Care Planning:  ACP discussion was held with the patient during this visit. Patient has an  advance directive (not in EMR), copy requested.    Identification of Risk Factors:  Risk factors include: Immunizations Discussed/Encouraged (specific immunizations; Prevnar ).    Review of Systems   Constitutional: Negative for chills, fatigue and fever.   HENT: Negative for congestion, ear pain and sinus pressure.    Eyes: Negative for visual disturbance.   Respiratory: Negative for cough, chest tightness, shortness of breath and wheezing.    Cardiovascular: Negative for chest pain and palpitations.   Gastrointestinal: Negative for abdominal pain, blood in stool and constipation.        GERD/heartburn   Endocrine: Negative for cold intolerance and heat intolerance.   Genitourinary: Negative for dysuria and frequency.   Musculoskeletal: Negative for arthralgias and back pain.   Skin: Negative for color change and rash.   Allergic/Immunologic: Negative for environmental allergies.   Neurological: Negative for dizziness and headaches.   Hematological: Negative for adenopathy. Does not bruise/bleed easily.   Psychiatric/Behavioral: Positive for sleep disturbance. Negative for dysphoric mood and suicidal ideas. The patient is not nervous/anxious.        Compared to one year ago, the patient feels her physical health is the same.  Compared to one year ago, the patient feels her mental health is the same.    Objective     Physical Exam  Vitals signs and nursing note reviewed.   Constitutional:       Appearance: She is well-developed.   Eyes:      Conjunctiva/sclera: Conjunctivae normal.      Pupils: Pupils are equal, round, and reactive to light.   Neck:      Musculoskeletal: Normal range of motion and neck supple.      Thyroid: No thyromegaly.   Cardiovascular:      Rate and Rhythm: Normal rate and regular rhythm.      Heart sounds: Normal heart sounds. No murmur.   Pulmonary:      Effort: Pulmonary effort is normal.      Breath sounds: Normal breath sounds. No wheezing.   Chest:      Breasts:         Right: No inverted  "nipple, mass, nipple discharge, skin change or tenderness.         Left: No inverted nipple, mass, nipple discharge, skin change or tenderness.   Abdominal:      General: Bowel sounds are normal. There is no distension.      Palpations: Abdomen is soft. There is no mass.      Tenderness: There is no abdominal tenderness.   Musculoskeletal: Normal range of motion.         General: No tenderness.   Lymphadenopathy:      Cervical: No cervical adenopathy.      Upper Body:      Right upper body: No supraclavicular, axillary or pectoral adenopathy.      Left upper body: No supraclavicular, axillary or pectoral adenopathy.   Skin:     General: Skin is warm and dry.      Findings: No rash.   Neurological:      Mental Status: She is alert and oriented to person, place, and time.      Cranial Nerves: No cranial nerve deficit.      Sensory: No sensory deficit.      Coordination: Coordination normal.      Gait: Gait normal.   Psychiatric:         Speech: Speech normal.         Behavior: Behavior normal.         Thought Content: Thought content normal.         Judgment: Judgment normal.       ECG 12 Lead    Date/Time: 10/14/2020 2:46 PM  Performed by: Agata Miner MD  Authorized by: Agata Miner MD   Comparison: compared with previous ECG   Similar to previous ECG  Rhythm: sinus rhythm  Rate: normal  BPM: 74  Conduction: conduction normal  ST Segments: ST segments normal  T Waves: T waves normal  QRS axis: normal    Clinical impression: normal ECG              Vitals:    10/14/20 1419   BP: 110/64   BP Location: Left arm   Patient Position: Sitting   Cuff Size: Adult   Pulse: 76   Temp: 97.4 °F (36.3 °C)   TempSrc: Temporal   SpO2: 99%   Weight: 57.4 kg (126 lb 9.6 oz)   Height: 149.9 cm (59.02\")   PainSc: 0-No pain       Patient's Body mass index is 25.56 kg/m². BMI is within normal parameters. No follow-up required..      CMP:  Lab Results   Component Value Date    BUN 15 09/14/2020    CREATININE 0.87 09/14/2020    " EGFRIFNONA 66 09/14/2020    BCR 17.2 09/14/2020     09/14/2020    K 4.5 09/14/2020    CO2 28.0 09/14/2020    CALCIUM 9.4 09/14/2020    ALBUMIN 4.70 09/14/2020    BILITOT 0.5 09/14/2020    ALKPHOS 69 09/14/2020    AST 26 09/14/2020    ALT 24 09/14/2020     HbA1c:  Lab Results   Component Value Date    HGBA1C 4.80 09/14/2020    HGBA1C 5.43 02/04/2020     Microalbumin:  No results found for: MICROALBUR, POCMALB, POCCREAT  Lipid Panel  Lab Results   Component Value Date    CHOL 218 (H) 09/14/2020    TRIG 76 09/14/2020    HDL 72 (H) 09/14/2020     (H) 09/14/2020    AST 26 09/14/2020    ALT 24 09/14/2020       Assessment/Plan   ·   Patient Instructions   Problem List Items Addressed This Visit        Cardiovascular and Mediastinum    Hypercholesterolemia    Overview     10/14/2020 Agata Miner MD    Start pravastatin low dose every evening. Continue low fat healthy diet and regular walking.    New medication added today. Benefits and possible side effects discussed. Patient verbalized understanding.           Relevant Medications    pravastatin (PRAVACHOL) 10 MG tablet    Other Relevant Orders    Lipid Panel    Comprehensive Metabolic Panel    ECG 12 Lead       Digestive    GERD (gastroesophageal reflux disease)    Overview     10/14/2020 Agata Miner MD    Continue omeprazole for a month. Then may see how it goes without it. Continue to avoid eating close to bedtime. Avoid caffeine and carbonated beverages and chocolate. Decrease wine.          Relevant Medications    omeprazole (priLOSEC) 20 MG capsule       Genitourinary    Atrophic vaginitis    Overview     10/16/2020 Agata Miner MD    Continue vaginal estrogen at bedtime  2-3 nights a week.         Relevant Medications    estradiol (VAGIFEM) 10 MCG tablet vaginal tablet       Other    Homocysteinemia (CMS/HCC)    Overview     10/16/2020 Agata Miner MD    Continue l-methylfolate daily.         Insomnia    Overview     10/16/2020  Agata Miner MD    Continue Ambien as needed for sleep.    We discussed sleep hygiene including going to bed at the same time and getting up at the same time every day, going to bed early enough to get 7 or 8 hours in bed, reading and relaxing before bedtime, and avoiding the TV, computer, phone, iPad close to bedtime.  Avoiding eating or drinking wine close to bedtime also helps.           Relevant Medications    zolpidem (AMBIEN) 5 MG tablet    Annual physical exam - Primary    Overview     10/16/2020 Agata Miner MD    She is up-to-date on vaccinations.    Mammogram and DEXA ordered.  She is up-to-date on colonoscopy.           Other Visit Diagnoses     Screening for osteoporosis        Relevant Orders    DEXA Bone Density Axial    Breast cancer screening by mammogram        Relevant Orders    Mammo Screening Digital Tomosynthesis Bilateral With CAD             Diagnosis Plan   1. Annual physical exam     2. Hypercholesterolemia  pravastatin (PRAVACHOL) 10 MG tablet    Lipid Panel    Comprehensive Metabolic Panel    ECG 12 Lead   3. Primary insomnia  zolpidem (AMBIEN) 5 MG tablet   4. Gastroesophageal reflux disease without esophagitis     5. Atrophic vaginitis     6. Homocysteinemia (CMS/HCC)     7. Screening for osteoporosis  DEXA Bone Density Axial   8. Breast cancer screening by mammogram  Mammo Screening Digital Tomosynthesis Bilateral With CAD       Outpatient Encounter Medications as of 10/14/2020   Medication Sig Dispense Refill   • Calcium Carbonate-Vitamin D (CALTRATE 600+D PO) Take  by mouth.     • calcium polycarbophil (KONSYL FIBER) 625 MG tablet Konsyl Fiber   Daily     • estradiol (VAGIFEM) 10 MCG tablet vaginal tablet Insert 1 tablet into the vagina 2 (Two) Times a Week. 25 tablet 3   • l-methylfolate 15 MG tablet tablet TAKE 1 TABLET BY MOUTH EVERY DAY 90 tablet 3   • mupirocin (BACTROBAN) 2 % ointment prn  0   • ondansetron ODT (ZOFRAN-ODT) 4 MG disintegrating tablet Take 1 tablet by  mouth Every 8 (Eight) Hours As Needed for Nausea or Vomiting. 10 tablet 0   • phenazopyridine (PYRIDIUM) 200 MG tablet Take 1 tablet by mouth 3 (Three) Times a Day As Needed for Bladder Spasms. 9 tablet 0   • Polyethylene Glycol 3350 (MIRALAX PO) Take  by mouth. 3 times a week     • zolpidem (AMBIEN) 5 MG tablet Take 1 tablet by mouth At Night As Needed for Sleep. 10 tablet 0   • omeprazole (priLOSEC) 20 MG capsule Take 1 capsule by mouth Daily. 30 capsule 2   • pravastatin (PRAVACHOL) 10 MG tablet Take 1 tablet by mouth Every Night. 90 tablet 1   • [DISCONTINUED] calcium polycarbophil (FIBERCON) 625 MG tablet Take 625 mg by mouth Daily.     • [DISCONTINUED] fluconazole (DIFLUCAN) 200 MG tablet Take 1 tablet by mouth Daily. 7 tablet 0   • [DISCONTINUED] GLUCOSAMINE-CHONDROIT-VIT C-MN PO Take 2 capsules by mouth Daily.     • [DISCONTINUED] ibuprofen (ADVIL,MOTRIN) 200 MG tablet Take 200 mg by mouth Every 6 (Six) Hours As Needed for Mild Pain .     • [DISCONTINUED] nitrofurantoin, macrocrystal-monohydrate, (MACROBID) 100 MG capsule Take 1 capsule by mouth 2 (Two) Times a Day. 14 capsule 0   • [DISCONTINUED] oseltamivir (TAMIFLU) 75 MG capsule Take 1 capsule by mouth 2 (Two) Times a Day. 10 capsule 0   • [DISCONTINUED] Probiotic Product (ALIGN) 4 MG capsule Take by mouth.     • [DISCONTINUED] triamcinolone (KENALOG) 0.5 % ointment   1   • [DISCONTINUED] TURMERIC PO Tumeric     • [DISCONTINUED] zolpidem (AMBIEN) 5 MG tablet Take 1 tablet by mouth At Night As Needed for Sleep. 10 tablet 0     No facility-administered encounter medications on file as of 10/14/2020.        Reviewed use of high risk medication in the elderly: not applicable  Reviewed for potential of harmful drug interactions in the elderly: not applicable    Follow Up:  Return in about 6 months (around 4/14/2021) for recheck fasting.     An After Visit Summary and PPPS with all of these plans were given to the patient.           Note: Part of this note may  be an electronic transcription/translation of spoken language to printed text using the Dragon Dictation System.

## 2020-10-16 PROBLEM — Z00.00 ANNUAL PHYSICAL EXAM: Status: ACTIVE | Noted: 2020-10-16

## 2020-10-16 RX ORDER — ZOLPIDEM TARTRATE 5 MG/1
5 TABLET ORAL NIGHTLY PRN
Qty: 10 TABLET | Refills: 0 | Status: SHIPPED | OUTPATIENT
Start: 2020-10-16

## 2020-10-16 RX ORDER — OMEPRAZOLE 20 MG/1
20 CAPSULE, DELAYED RELEASE ORAL DAILY
Qty: 30 CAPSULE | Refills: 2
Start: 2020-10-16 | End: 2021-04-14

## 2020-10-16 NOTE — PATIENT INSTRUCTIONS
Patient Instructions   Problem List Items Addressed This Visit        Cardiovascular and Mediastinum    Hypercholesterolemia    Overview     10/14/2020 Agata Miner MD    Start pravastatin low dose every evening. Continue low fat healthy diet and regular walking.    New medication added today. Benefits and possible side effects discussed. Patient verbalized understanding.           Relevant Medications    pravastatin (PRAVACHOL) 10 MG tablet    Other Relevant Orders    Lipid Panel    Comprehensive Metabolic Panel    ECG 12 Lead       Digestive    GERD (gastroesophageal reflux disease)    Overview     10/14/2020 Agata Miner MD    Continue omeprazole for a month. Then may see how it goes without it. Continue to avoid eating close to bedtime. Avoid caffeine and carbonated beverages and chocolate. Decrease wine.          Relevant Medications    omeprazole (priLOSEC) 20 MG capsule       Genitourinary    Atrophic vaginitis    Overview     10/16/2020 Agata Miner MD    Continue vaginal estrogen at bedtime  2-3 nights a week.         Relevant Medications    estradiol (VAGIFEM) 10 MCG tablet vaginal tablet       Other    Homocysteinemia (CMS/HCC)    Overview     10/16/2020 Agata Miner MD    Continue l-methylfolate daily.         Insomnia    Overview     10/16/2020 Agata Miner MD    Continue Ambien as needed for sleep.    We discussed sleep hygiene including going to bed at the same time and getting up at the same time every day, going to bed early enough to get 7 or 8 hours in bed, reading and relaxing before bedtime, and avoiding the TV, computer, phone, iPad close to bedtime.  Avoiding eating or drinking wine close to bedtime also helps.           Relevant Medications    zolpidem (AMBIEN) 5 MG tablet    Annual physical exam - Primary    Overview     10/16/2020 Agata Miner MD    She is up-to-date on vaccinations.    Mammogram and DEXA ordered.  She is up-to-date on colonoscopy.            Other Visit Diagnoses     Screening for osteoporosis        Relevant Orders    DEXA Bone Density Axial    Breast cancer screening by mammogram        Relevant Orders    Mammo Screening Digital Tomosynthesis Bilateral With CAD             Heart-Healthy Eating Plan  Many factors influence your heart (coronary) health, including eating and exercise habits. Coronary risk increases with abnormal blood fat (lipid) levels. Heart-healthy meal planning includes limiting unhealthy fats, increasing healthy fats, and making other diet and lifestyle changes.  What is my plan?  Your health care provider may recommend that you:  · Limit your fat intake to _________% or less of your total calories each day.  · Limit your saturated fat intake to _________% or less of your total calories each day.  · Limit the amount of cholesterol in your diet to less than _________ mg per day.  What are tips for following this plan?  Cooking  Cook foods using methods other than frying. Baking, boiling, grilling, and broiling are all good options. Other ways to reduce fat include:  · Removing the skin from poultry.  · Removing all visible fats from meats.  · Steaming vegetables in water or broth.  Meal planning    · At meals, imagine dividing your plate into fourths:  ? Fill one-half of your plate with vegetables and green salads.  ? Fill one-fourth of your plate with whole grains.  ? Fill one-fourth of your plate with lean protein foods.  · Eat 4-5 servings of vegetables per day. One serving equals 1 cup raw or cooked vegetable, or 2 cups raw leafy greens.  · Eat 4-5 servings of fruit per day. One serving equals 1 medium whole fruit, ¼ cup dried fruit, ½ cup fresh, frozen, or canned fruit, or ½ cup 100% fruit juice.  · Eat more foods that contain soluble fiber. Examples include apples, broccoli, carrots, beans, peas, and barley. Aim to get 25-30 g of fiber per day.  · Increase your consumption of legumes, nuts, and seeds to 4-5 servings per week.  One serving of dried beans or legumes equals ½ cup cooked, 1 serving of nuts is ¼ cup, and 1 serving of seeds equals 1 tablespoon.  Fats  · Choose healthy fats more often. Choose monounsaturated and polyunsaturated fats, such as olive and canola oils, flaxseeds, walnuts, almonds, and seeds.  · Eat more omega-3 fats. Choose salmon, mackerel, sardines, tuna, flaxseed oil, and ground flaxseeds. Aim to eat fish at least 2 times each week.  · Check food labels carefully to identify foods with trans fats or high amounts of saturated fat.  · Limit saturated fats. These are found in animal products, such as meats, butter, and cream. Plant sources of saturated fats include palm oil, palm kernel oil, and coconut oil.  · Avoid foods with partially hydrogenated oils in them. These contain trans fats. Examples are stick margarine, some tub margarines, cookies, crackers, and other baked goods.  · Avoid fried foods.  General information  · Eat more home-cooked food and less restaurant, buffet, and fast food.  · Limit or avoid alcohol.  · Limit foods that are high in starch and sugar.  · Lose weight if you are overweight. Losing just 5-10% of your body weight can help your overall health and prevent diseases such as diabetes and heart disease.  · Monitor your salt (sodium) intake, especially if you have high blood pressure. Talk with your health care provider about your sodium intake.  · Try to incorporate more vegetarian meals weekly.  What foods can I eat?  Fruits  All fresh, canned (in natural juice), or frozen fruits.  Vegetables  Fresh or frozen vegetables (raw, steamed, roasted, or grilled). Green salads.  Grains  Most grains. Choose whole wheat and whole grains most of the time. Rice and pasta, including brown rice and pastas made with whole wheat.  Meats and other proteins  Lean, well-trimmed beef, veal, pork, and lamb. Chicken and turkey without skin. All fish and shellfish. Wild duck, rabbit, pheasant, and venison. Egg  whites or low-cholesterol egg substitutes. Dried beans, peas, lentils, and tofu. Seeds and most nuts.  Dairy  Low-fat or nonfat cheeses, including ricotta and mozzarella. Skim or 1% milk (liquid, powdered, or evaporated). Buttermilk made with low-fat milk. Nonfat or low-fat yogurt.  Fats and oils  Non-hydrogenated (trans-free) margarines. Vegetable oils, including soybean, sesame, sunflower, olive, peanut, safflower, corn, canola, and cottonseed. Salad dressings or mayonnaise made with a vegetable oil.  Beverages  Water (mineral or sparkling). Coffee and tea. Diet carbonated beverages.  Sweets and desserts  Sherbet, gelatin, and fruit ice. Small amounts of dark chocolate.  Limit all sweets and desserts.  Seasonings and condiments  All seasonings and condiments.  The items listed above may not be a complete list of foods and beverages you can eat. Contact a dietitian for more options.  What foods are not recommended?  Fruits  Canned fruit in heavy syrup. Fruit in cream or butter sauce. Fried fruit. Limit coconut.  Vegetables  Vegetables cooked in cheese, cream, or butter sauce. Fried vegetables.  Grains  Breads made with saturated or trans fats, oils, or whole milk. Croissants. Sweet rolls. Donuts. High-fat crackers, such as cheese crackers.  Meats and other proteins  Fatty meats, such as hot dogs, ribs, sausage, reed, rib-eye roast or steak. High-fat deli meats, such as salami and bologna. Caviar. Domestic duck and goose. Organ meats, such as liver.  Dairy  Cream, sour cream, cream cheese, and creamed cottage cheese. Whole milk cheeses. Whole or 2% milk (liquid, evaporated, or condensed). Whole buttermilk. Cream sauce or high-fat cheese sauce. Whole-milk yogurt.  Fats and oils  Meat fat, or shortening. Cocoa butter, hydrogenated oils, palm oil, coconut oil, palm kernel oil. Solid fats and shortenings, including reed fat, salt pork, lard, and butter. Nondairy cream substitutes. Salad dressings with cheese or sour  cream.  Beverages  Regular sodas and any drinks with added sugar.  Sweets and desserts  Frosting. Pudding. Cookies. Cakes. Pies. Milk chocolate or white chocolate. Buttered syrups. Full-fat ice cream or ice cream drinks.  The items listed above may not be a complete list of foods and beverages to avoid. Contact a dietitian for more information.  Summary  · Heart-healthy meal planning includes limiting unhealthy fats, increasing healthy fats, and making other diet and lifestyle changes.  · Lose weight if you are overweight. Losing just 5-10% of your body weight can help your overall health and prevent diseases such as diabetes and heart disease.  · Focus on eating a balance of foods, including fruits and vegetables, low-fat or nonfat dairy, lean protein, nuts and legumes, whole grains, and heart-healthy oils and fats.  This information is not intended to replace advice given to you by your health care provider. Make sure you discuss any questions you have with your health care provider.  Document Released: 09/26/2009 Document Revised: 01/25/2019 Document Reviewed: 01/25/2019  [employer] Patient Education © 2020 [employer]    Preventing Osteoporosis, Adult  Osteoporosis is a condition that causes the bones to lose density. This means that the bones become thinner, and the normal spaces in bone tissue become larger. Low bone density can make the bones weak and cause them to break more easily.  Osteoporosis cannot always be prevented, but you can take steps to lower your risk of developing this condition.  How can this condition affect me?  If you develop osteoporosis, you will be more likely to break bones in your wrist, spine, or hip. Even a minor accident or injury can be enough to break weak bones. The bones will also be slower to heal. Osteoporosis can cause other problems as well, such as a stooped posture or trouble with movement.  Osteoporosis can occur with aging. As you get older, you may lose bone tissue  more quickly, or it may be replaced more slowly. Osteoporosis is more likely to develop if you have poor nutrition or do not get enough calcium or vitamin D. Other lifestyle factors can also play a role. By eating a well-balanced diet and making lifestyle changes, you can help keep your bones strong and healthy, lowering your chances of developing osteoporosis.  What can increase my risk?  The following factors may make you more likely to develop osteoporosis:  · Having a family history of the condition.  · Having poor nutrition or not getting enough calcium or vitamin D.  · Using certain medicines, such as steroid medicines or antiseizure medicines.  · Being any of the following:  ? 50 years of age or older.  ? Female.  ? A woman who has gone through menopause (is postmenopausal).  ? White () or of  descent.  · Smoking or having a history of smoking.  · Not being physically active (being sedentary).  · Having a small body frame.  What actions can I take to prevent this?    Get enough calcium    · Make sure you get enough calcium every day. Calcium is the most important mineral for bone health. Most people can get enough calcium from their diet, but supplements may be recommended for people who are at risk for osteoporosis. Follow these guidelines:  ? If you are age 50 or younger, aim to get 1,000 mg of calcium every day.  ? If you are older than age 50, aim to get 1,200 mg of calcium every day.  · Good sources of calcium include:  ? Dairy products, such as low-fat or nonfat milk, cheese, and yogurt.  ? Dark green leafy vegetables, such as bok rudy and broccoli.  ? Foods that have had calcium added to them (calcium-fortified foods), such as orange juice, cereal, bread, soy beverages, and tofu products.  ? Nuts, such as almonds.  · Check nutrition labels to see how much calcium is in a food or drink.  Get enough vitamin D  · Try to get enough vitamin D every day. Vitamin D is the most essential vitamin  for bone health. It helps the body absorb calcium. Follow these guidelines for how much vitamin D to get from food:  ? If you are age 70 or younger, aim to get at least 600 international units (IU) every day. Your health care provider may suggest more.  ? If you are older than age 70, aim to get at least 800 international units every day. Your health care provider may suggest more.  · Good sources of vitamin D in your diet include:  ? Egg yolks.  ? Oily fish, such as salmon, sardines, and tuna.  ? Milk and cereal fortified with vitamin D.  · Your body also makes vitamin D when you are out in the sun. Exposing the bare skin on your face, arms, legs, or back to the sun for no more than 30 minutes a day, 2 times a week is more than enough. Beyond that, make sure you use sunblock to protect your skin from sunburn, which increases your risk for skin cancer.  Exercise  · Stay active and get exercise every day.  · Ask your health care provider what types of exercise are best for you. Weight-bearing and strength-building activities are important for building and maintaining healthy bones. Some examples of these types of activities include:  ? Walking and hiking.  ? Jogging and running.  ? Dancing.  ? Gym exercises.  ? Lifting weights.  ? Tennis and racquetball.  ? Climbing stairs.  ? Aerobics.  Make other lifestyle changes  · Do not use any products that contain nicotine or tobacco, such as cigarettes, e-cigarettes, and chewing tobacco. If you need help quitting, ask your health care provider.  · Lose weight if you are overweight.  · If you drink alcohol:  ? Limit how much you use to:  § 0-1 drink a day for nonpregnant women.  § 0-2 drinks a day for men.  ? Be aware of how much alcohol is in your drink. In the [place] one drink equals one 12 oz bottle of beer (355 mL), one 5 oz glass of wine (148 mL), or one 1½ oz glass of hard liquor (44 mL).  Where to find support  If you need help making changes to prevent osteoporosis,  talk with your health care provider. You can ask for a referral to a diet and nutrition specialist (dietitian) and a physical therapist.  Where to find more information  Learn more about osteoporosis from:  · NIH Osteoporosis and Related Bone Diseases National Resource Center: www.bones.nih.gov  · [place] Office on Women's Health: www.womenshealth.gov  · National Osteoporosis Foundation: www.nof.org  Summary  · Osteoporosis is a condition that causes weak bones that are more likely to break.  · Eat a healthy diet, making sure you get enough calcium and vitamin D, and stay active by getting regular exercise to help prevent osteoporosis.  · Other ways to reduce your risk of osteoporosis include maintaining a healthy weight and avoiding alcohol and products that contain nicotine or tobacco.  This information is not intended to replace advice given to you by your health care provider. Make sure you discuss any questions you have with your health care provider.  Document Released: 01/01/2017 Document Revised: 07/17/2020 Document Reviewed: 07/17/2020  [employer] Patient Education © 2020 [employer]

## 2020-10-20 ENCOUNTER — TELEPHONE (OUTPATIENT)
Dept: INTERNAL MEDICINE | Facility: CLINIC | Age: 65
End: 2020-10-20

## 2020-10-20 PROCEDURE — U0003 INFECTIOUS AGENT DETECTION BY NUCLEIC ACID (DNA OR RNA); SEVERE ACUTE RESPIRATORY SYNDROME CORONAVIRUS 2 (SARS-COV-2) (CORONAVIRUS DISEASE [COVID-19]), AMPLIFIED PROBE TECHNIQUE, MAKING USE OF HIGH THROUGHPUT TECHNOLOGIES AS DESCRIBED BY CMS-2020-01-R: HCPCS | Performed by: FAMILY MEDICINE

## 2020-10-20 NOTE — TELEPHONE ENCOUNTER
Called and spoke with patient. She has already been too [facility] today and has been tested.

## 2020-10-20 NOTE — TELEPHONE ENCOUNTER
PT CALLED STATING SHE HAS A COUGH, CHILLS, AND FEVER    PT WANTED TO KNOW WHERE SHE COULD GET TESTED    PT WAS REFERRED TO URGENT CARE    PT WANTED A CALL BACK TO DISCUSS IF MANY PATIENTS HAVE THE FLU.    CALL BACK NUMBER: [phone]

## 2020-11-02 ENCOUNTER — TELEPHONE (OUTPATIENT)
Dept: INTERNAL MEDICINE | Facility: CLINIC | Age: 65
End: 2020-11-02

## 2020-11-02 DIAGNOSIS — U07.1 COVID-19 VIRUS DETECTED: Primary | ICD-10-CM

## 2020-11-02 NOTE — TELEPHONE ENCOUNTER
Patient wanted to be retested for covid-19 and to get antibody testing done. She recently recovered from Covid illness after quarantining at home.     I explained that Covid test will still be positive and may be for some time. It doesn't mean she still infectious necessarily.     I told her it would probably be fine to go visit grandchildren in 3 wks or so. Doing another test would not help with that decision.    Her son is insisting that she and her  get an antibody test before he and his wife will let them come visit the children.  So I will order the antibody test for both of them.  History Mrs. Matos understand that the results will not mean any thing either way.

## 2020-11-04 ENCOUNTER — LAB (OUTPATIENT)
Dept: LAB | Facility: HOSPITAL | Age: 65
End: 2020-11-04

## 2020-11-04 ENCOUNTER — TELEPHONE (OUTPATIENT)
Dept: INTERNAL MEDICINE | Facility: CLINIC | Age: 65
End: 2020-11-04

## 2020-11-04 DIAGNOSIS — U07.1 COVID-19: Primary | ICD-10-CM

## 2020-11-04 DIAGNOSIS — U07.1 COVID-19 VIRUS DETECTED: ICD-10-CM

## 2020-11-04 DIAGNOSIS — U07.1 COVID-19: ICD-10-CM

## 2020-11-04 PROCEDURE — 36415 COLL VENOUS BLD VENIPUNCTURE: CPT

## 2020-11-04 PROCEDURE — 86769 SARS-COV-2 COVID-19 ANTIBODY: CPT

## 2020-11-04 NOTE — TELEPHONE ENCOUNTER
[facility] called.  The antibody test that we ordered for today is incorrect.  We need to cancel that order and order test code [id number].  I have preloaded this test.  Please cancel the incorrect order.

## 2020-11-04 NOTE — TELEPHONE ENCOUNTER
Spoke to Tiarra Malone [phone] to advise Dr Miner has corrected the order.      Advised patient she said she has already been drawn and erlin wait to hear about results.

## 2020-11-06 LAB — SARS-COV-2 IGM SERPL QL IA: NORMAL

## 2020-11-11 DIAGNOSIS — U07.1 COVID-19 VIRUS DETECTED: Primary | ICD-10-CM

## 2020-11-12 ENCOUNTER — LAB (OUTPATIENT)
Dept: LAB | Facility: HOSPITAL | Age: 65
End: 2020-11-12

## 2020-11-12 DIAGNOSIS — U07.1 COVID-19 VIRUS DETECTED: Primary | ICD-10-CM

## 2020-11-12 DIAGNOSIS — U07.1 COVID-19 VIRUS DETECTED: ICD-10-CM

## 2020-11-12 PROCEDURE — 86769 SARS-COV-2 COVID-19 ANTIBODY: CPT

## 2020-11-12 PROCEDURE — 36415 COLL VENOUS BLD VENIPUNCTURE: CPT

## 2020-11-13 LAB — SARS-COV-2 IGM SERPL QL IA: NEGATIVE

## 2020-12-10 ENCOUNTER — TELEPHONE (OUTPATIENT)
Dept: INTERNAL MEDICINE | Facility: CLINIC | Age: 65
End: 2020-12-10

## 2021-01-13 ENCOUNTER — IMMUNIZATION (OUTPATIENT)
Dept: VACCINE CLINIC | Facility: HOSPITAL | Age: 66
End: 2021-01-13

## 2021-01-13 PROCEDURE — 91300 HC SARSCOV02 VAC 30MCG/0.3ML IM: CPT | Performed by: FAMILY MEDICINE

## 2021-01-13 PROCEDURE — 0001A: CPT | Performed by: FAMILY MEDICINE

## 2021-01-28 ENCOUNTER — APPOINTMENT (OUTPATIENT)
Dept: MAMMOGRAPHY | Facility: HOSPITAL | Age: 66
End: 2021-01-28

## 2021-02-03 ENCOUNTER — IMMUNIZATION (OUTPATIENT)
Dept: VACCINE CLINIC | Facility: HOSPITAL | Age: 66
End: 2021-02-03

## 2021-02-03 PROCEDURE — 0002A: CPT | Performed by: INTERNAL MEDICINE

## 2021-02-03 PROCEDURE — 91300 HC SARSCOV02 VAC 30MCG/0.3ML IM: CPT | Performed by: INTERNAL MEDICINE

## 2021-02-09 ENCOUNTER — APPOINTMENT (OUTPATIENT)
Dept: BONE DENSITY | Facility: HOSPITAL | Age: 66
End: 2021-02-09

## 2021-03-25 ENCOUNTER — TELEPHONE (OUTPATIENT)
Dept: INTERNAL MEDICINE | Facility: CLINIC | Age: 66
End: 2021-03-25

## 2021-04-12 ENCOUNTER — LAB (OUTPATIENT)
Dept: LAB | Facility: HOSPITAL | Age: 66
End: 2021-04-12

## 2021-04-12 ENCOUNTER — TELEPHONE (OUTPATIENT)
Dept: INTERNAL MEDICINE | Facility: CLINIC | Age: 66
End: 2021-04-12

## 2021-04-12 DIAGNOSIS — E60 ZINC DEFICIENCY: ICD-10-CM

## 2021-04-12 DIAGNOSIS — E78.00 HYPERCHOLESTEROLEMIA: ICD-10-CM

## 2021-04-12 DIAGNOSIS — E55.9 VITAMIN D DEFICIENCY: ICD-10-CM

## 2021-04-12 LAB
25(OH)D3 SERPL-MCNC: 50.1 NG/ML
ALBUMIN SERPL-MCNC: 4.7 G/DL (ref 3.5–5.2)
ALBUMIN/GLOB SERPL: 2.1 G/DL
ALP SERPL-CCNC: 59 U/L (ref 39–117)
ALT SERPL W P-5'-P-CCNC: 21 U/L (ref 1–33)
ANION GAP SERPL CALCULATED.3IONS-SCNC: 11.7 MMOL/L (ref 5–15)
AST SERPL-CCNC: 27 U/L (ref 1–32)
BASOPHILS # BLD AUTO: 0.04 10*3/MM3 (ref 0–0.2)
BASOPHILS NFR BLD AUTO: 0.6 % (ref 0–1.5)
BILIRUB SERPL-MCNC: 0.5 MG/DL (ref 0–1.2)
BUN SERPL-MCNC: 13 MG/DL (ref 8–23)
BUN/CREAT SERPL: 19.4 (ref 7–25)
CALCIUM SPEC-SCNC: 9.4 MG/DL (ref 8.6–10.5)
CHLORIDE SERPL-SCNC: 100 MMOL/L (ref 98–107)
CHOLEST SERPL-MCNC: 246 MG/DL (ref 0–200)
CO2 SERPL-SCNC: 25.3 MMOL/L (ref 22–29)
CREAT SERPL-MCNC: 0.67 MG/DL (ref 0.57–1)
DEPRECATED RDW RBC AUTO: 39.9 FL (ref 37–54)
EOSINOPHIL # BLD AUTO: 0 10*3/MM3 (ref 0–0.4)
EOSINOPHIL NFR BLD AUTO: 0 % (ref 0.3–6.2)
ERYTHROCYTE [DISTWIDTH] IN BLOOD BY AUTOMATED COUNT: 12.8 % (ref 12.3–15.4)
GFR SERPL CREATININE-BSD FRML MDRD: 89 ML/MIN/1.73
GLOBULIN UR ELPH-MCNC: 2.2 GM/DL
GLUCOSE SERPL-MCNC: 94 MG/DL (ref 65–99)
HCT VFR BLD AUTO: 41.4 % (ref 34–46.6)
HCYS SERPL-MCNC: 13.2 UMOL/L (ref 0–15)
HDLC SERPL-MCNC: 84 MG/DL (ref 40–60)
HGB BLD-MCNC: 14.2 G/DL (ref 12–15.9)
IMM GRANULOCYTES # BLD AUTO: 0.01 10*3/MM3 (ref 0–0.05)
IMM GRANULOCYTES NFR BLD AUTO: 0.2 % (ref 0–0.5)
LDLC SERPL CALC-MCNC: 149 MG/DL (ref 0–100)
LDLC/HDLC SERPL: 1.75 {RATIO}
LYMPHOCYTES # BLD AUTO: 2.17 10*3/MM3 (ref 0.7–3.1)
LYMPHOCYTES NFR BLD AUTO: 33.7 % (ref 19.6–45.3)
MCH RBC QN AUTO: 29.9 PG (ref 26.6–33)
MCHC RBC AUTO-ENTMCNC: 34.3 G/DL (ref 31.5–35.7)
MCV RBC AUTO: 87.2 FL (ref 79–97)
MONOCYTES # BLD AUTO: 0.5 10*3/MM3 (ref 0.1–0.9)
MONOCYTES NFR BLD AUTO: 7.8 % (ref 5–12)
NEUTROPHILS NFR BLD AUTO: 3.72 10*3/MM3 (ref 1.7–7)
NEUTROPHILS NFR BLD AUTO: 57.7 % (ref 42.7–76)
NRBC BLD AUTO-RTO: 0 /100 WBC (ref 0–0.2)
PLATELET # BLD AUTO: 233 10*3/MM3 (ref 140–450)
PMV BLD AUTO: 12.6 FL (ref 6–12)
POTASSIUM SERPL-SCNC: 4.4 MMOL/L (ref 3.5–5.2)
PROT SERPL-MCNC: 6.9 G/DL (ref 6–8.5)
RBC # BLD AUTO: 4.75 10*6/MM3 (ref 3.77–5.28)
SODIUM SERPL-SCNC: 137 MMOL/L (ref 136–145)
TRIGL SERPL-MCNC: 77 MG/DL (ref 0–150)
TSH SERPL DL<=0.05 MIU/L-ACNC: 1.43 UIU/ML (ref 0.27–4.2)
VIT B12 BLD-MCNC: 582 PG/ML (ref 211–946)
VLDLC SERPL-MCNC: 13 MG/DL (ref 5–40)
WBC # BLD AUTO: 6.44 10*3/MM3 (ref 3.4–10.8)

## 2021-04-12 PROCEDURE — 82306 VITAMIN D 25 HYDROXY: CPT

## 2021-04-12 PROCEDURE — 84630 ASSAY OF ZINC: CPT

## 2021-04-12 PROCEDURE — 84443 ASSAY THYROID STIM HORMONE: CPT

## 2021-04-12 PROCEDURE — 83090 ASSAY OF HOMOCYSTEINE: CPT

## 2021-04-12 PROCEDURE — 82607 VITAMIN B-12: CPT

## 2021-04-12 PROCEDURE — 85025 COMPLETE CBC W/AUTO DIFF WBC: CPT

## 2021-04-12 PROCEDURE — 80061 LIPID PANEL: CPT

## 2021-04-12 PROCEDURE — 80053 COMPREHEN METABOLIC PANEL: CPT

## 2021-04-12 NOTE — TELEPHONE ENCOUNTER
Caller: Gricelda Matos    Relationship: Self    Best call back number:[phone]     What orders are you requesting (i.e. lab or imaging): LABS    In what timeframe would the patient need to come in: TODAY    Where will you receive your lab/imaging services: [facility]    Additional notes: PATIENT IS CURRENTLY FASTING AND WOULD LIKE TO COME IN WITHIN THE HOUR.

## 2021-04-14 ENCOUNTER — OFFICE VISIT (OUTPATIENT)
Dept: INTERNAL MEDICINE | Facility: CLINIC | Age: 66
End: 2021-04-14

## 2021-04-14 VITALS
BODY MASS INDEX: 25.01 KG/M2 | WEIGHT: 127.4 LBS | SYSTOLIC BLOOD PRESSURE: 126 MMHG | TEMPERATURE: 97.3 F | HEART RATE: 84 BPM | HEIGHT: 60 IN | DIASTOLIC BLOOD PRESSURE: 72 MMHG

## 2021-04-14 DIAGNOSIS — E55.9 VITAMIN D DEFICIENCY: ICD-10-CM

## 2021-04-14 DIAGNOSIS — M54.9 UPPER BACK PAIN ON RIGHT SIDE: Chronic | ICD-10-CM

## 2021-04-14 DIAGNOSIS — M79.621 PAIN OF RIGHT UPPER ARM: Chronic | ICD-10-CM

## 2021-04-14 DIAGNOSIS — R79.83 HOMOCYSTEINEMIA: ICD-10-CM

## 2021-04-14 DIAGNOSIS — M54.2 CERVICALGIA: ICD-10-CM

## 2021-04-14 DIAGNOSIS — F51.01 PRIMARY INSOMNIA: ICD-10-CM

## 2021-04-14 DIAGNOSIS — N95.2 ATROPHIC VAGINITIS: ICD-10-CM

## 2021-04-14 DIAGNOSIS — E60 ZINC DEFICIENCY: ICD-10-CM

## 2021-04-14 DIAGNOSIS — E78.00 HYPERCHOLESTEROLEMIA: Primary | ICD-10-CM

## 2021-04-14 LAB — ZINC SERPL-MCNC: 72 UG/DL (ref 44–115)

## 2021-04-14 PROCEDURE — 99214 OFFICE O/P EST MOD 30 MIN: CPT | Performed by: INTERNAL MEDICINE

## 2021-04-14 RX ORDER — CHOLECALCIFEROL (VITAMIN D3) 125 MCG
5 CAPSULE ORAL NIGHTLY PRN
COMMUNITY

## 2021-04-14 RX ORDER — ESTRADIOL 10 UG/1
1 INSERT VAGINAL 2 TIMES WEEKLY
Qty: 60 TABLET | Refills: 3 | Status: SHIPPED | OUTPATIENT
Start: 2021-04-15 | End: 2022-07-18

## 2021-04-14 RX ORDER — LEVOMEFOLATE CALCIUM 15 MG
15 TABLET ORAL DAILY
Qty: 90 TABLET | Refills: 3 | Status: SHIPPED | OUTPATIENT
Start: 2021-04-14 | End: 2022-06-22 | Stop reason: SDUPTHER

## 2021-04-14 RX ORDER — PRAVASTATIN SODIUM 10 MG
10 TABLET ORAL NIGHTLY
Qty: 90 TABLET | Refills: 1 | Status: SHIPPED | OUTPATIENT
Start: 2021-04-14 | End: 2021-09-10

## 2021-04-14 NOTE — PROGRESS NOTES
[facility]     Gricelda Matos  1955   [id number]      Patient Care Team:  Agata Miner MD as PCP - General  Agata Miner MD as PCP - Family Medicine    Chief Complaint   Patient presents with   • Hyperlipidemia     f/u   • Arm Pain     R arm pain off and on, thinks it is muscle related            HPI  Patient is a 65 y.o. female presents with R upper back pain. Onset of symptoms was gradual starting a few months ago. Since had Covid. Chronicity chronic. Severity mild to moderate.  Symptoms are associated with right than left neck tightness and tenderness. Pertinent negatives no pain with rest. Pain is not brought on by exertion.  Symptoms are aggravated by moving R arm and torso.   Symptoms improve with  Rolling tennis ball on back..  Context it had improved then started again after recived first Covid vaccine.     HPI  Bilateral neck pain and tenderness, worse on right side.. Some radiation of pain to upper R arm. Not related to exercise and walking. Advil helps. No tenderness of arm      CHRONIC CONDITIONS   GERD symptoms resolved. Does not need omeprazole now.    Constipation helped by magnesium every night and miralax as needed.    For sleep taking calcium magnesium compounded and melatonin 5 mg and a sylvain probiotic and sleeping much better.    Hyperlipidemia-she never started pravastatin.  Taking Juice Plus 6 tablet daily for about 2 months. It improved her daughter's high cholesterol.  Her LDL however has increased and is now 149.    Past Medical History:   Diagnosis Date   • Abdominal pain 2013    Dr. Garvin felt from too much raw vegetables   • Arthritis    • Atypical squamous cells of undetermined significance (ASCUS) on Papanicolaou smear of cervix 2004   • Brain hemangioma (CMS/HCC) 2014    benign-(incidental finding on MRI)   • Burning tongue syndrome 2015   • Cough    • Elevated homocysteine     MTHFR pos for 2 copies of T2555S mutation,neg for C677T mutation   •  Fungal toenail infection 2019 Agata Miner MD  Continue fluconazole weekly per Dr. Romero.  Follow-up with him as planned.   • Glossitis 2019   • Heart murmur     No murmur noted for years.   • Hormone deficiency     Dr. Rod did hormone testing-she took HRT for about 6 months   • Mitral valve prolapse     Diagnosed when she was younger.  Asymptomatic.  Stress echo done by Dr.Avichai Liang in  was normal.   • Nocturnal cough 2019   • OA (osteoarthritis) 2016    severe OA of R hip (L hip mild)   • Open wound of finger with complication 2019   • Ovarian cyst    • Skin cancer     basal cell skin cancers on back   • Spine disorder     Lumbar spine degenerative changes/mild stenosis/moderate facet disease   • Zinc deficiency 2019       Past Surgical History:   Procedure Laterality Date   •  SECTION      x3; , , & 1987   • COLONOSCOPY     • COLPOSCOPY  2004    by Dr. Bustos   • ENDOSCOPY      colonoscopy okay    • HIP ARTHROPLASTY Right 2016   • TONSILLECTOMY         Family History   Problem Relation Age of Onset   • Hypertension Mother    • Hypertension Father    • Cancer Father         Bladder   • Colon polyps Father    • Breast cancer Paternal Grandmother    • Colon cancer Maternal Grandmother    • Hypertension Maternal Grandmother    • Coronary artery disease Maternal Grandfather 65         of MI at 65   • Other Daughter 28        MTH-FR gene mutation       Social History     Socioeconomic History   • Marital status:      Spouse name: Not on file   • Number of children: Not on file   • Years of education: Not on file   • Highest education level: Not on file   Tobacco Use   • Smoking status: Former Smoker     Packs/day: 0.50     Years: 4.00     Pack years: 2.00     Types: Cigarettes     Quit date:      Years since quittin.3   • Smokeless tobacco: Never Used   • Tobacco comment: Quit    Substance and Sexual  "Activity   • Alcohol use: Yes     Alcohol/week: 1.0 standard drinks     Types: 1 Glasses of wine per week     Comment: daILY   • Drug use: No   • Sexual activity: Defer       Allergies   Allergen Reactions   • Contrast Dye Hives   • Cyclobenzaprine Unknown (See Comments)     Unknown   • Naproxen Sodium Other (See Comments)     Excessive bruising   Aleive        Review of Systems:     Review of Systems   Constitutional: Negative for chills, fatigue and fever.   HENT: Negative for congestion, ear pain and sinus pressure.    Respiratory: Negative for cough, chest tightness, shortness of breath and wheezing.    Cardiovascular: Negative for chest pain, palpitations and leg swelling.   Gastrointestinal: Negative for abdominal pain, blood in stool and constipation.   Musculoskeletal: Positive for neck pain.   Skin: Negative for color change.   Allergic/Immunologic: Negative for environmental allergies.   Neurological: Negative for dizziness, speech difficulty and headache.   Psychiatric/Behavioral: Negative for decreased concentration. The patient is not nervous/anxious.        Vital Signs  Vitals:    04/14/21 1036   BP: 126/72   BP Location: Right arm   Patient Position: Sitting   Cuff Size: Adult   Pulse: 84   Temp: 97.3 °F (36.3 °C)   TempSrc: Infrared   Weight: 57.8 kg (127 lb 6.4 oz)   Height: 152.4 cm (60\")   PainSc: 0-No pain     Body mass index is 24.88 kg/m².      Current Outpatient Medications:   •  Calcium Carbonate-Vitamin D (CALTRATE 600+D PO), Take  by mouth., Disp: , Rfl:   •  Calcium-Magnesium-Vitamin D (CALCIUM MAGNESIUM PO), Take  by mouth Every Evening., Disp: , Rfl:   •  [START ON 4/15/2021] estradiol (VAGIFEM) 10 MCG tablet vaginal tablet, Insert 1 tablet into the vagina 2 (Two) Times a Week., Disp: 60 tablet, Rfl: 3  •  l-methylfolate 15 MG tablet tablet, Take 1 tablet by mouth Daily., Disp: 90 tablet, Rfl: 3  •  melatonin 5 MG tablet tablet, Take 5 mg by mouth At Night As Needed., Disp: , Rfl:   •  " mupirocin (BACTROBAN) 2 % ointment, prn, Disp: , Rfl: 0  •  Nutritional Supplements (JUICE PLUS FIBRE PO), Take 6 capsules by mouth Daily., Disp: , Rfl:   •  phenazopyridine (PYRIDIUM) 200 MG tablet, Take 1 tablet by mouth 3 (Three) Times a Day As Needed for Bladder Spasms., Disp: 9 tablet, Rfl: 0  •  Polyethylene Glycol 3350 (MIRALAX PO), Take  by mouth. 3 times a week, Disp: , Rfl:   •  Probiotic Product (PROBIOTIC PEARLS ADVANTAGE PO), Take 1 capsule by mouth Daily., Disp: , Rfl:   •  zolpidem (AMBIEN) 5 MG tablet, Take 1 tablet by mouth At Night As Needed for Sleep., Disp: 10 tablet, Rfl: 0  •  calcium polycarbophil (KONSYL FIBER) 625 MG tablet, Konsyl Fiber  Daily, Disp: , Rfl:   •  pravastatin (PRAVACHOL) 10 MG tablet, Take 1 tablet by mouth Every Night., Disp: 90 tablet, Rfl: 1    Physical Exam:    Physical Exam  Vitals and nursing note reviewed.   Constitutional:       Appearance: She is well-developed.   HENT:      Head: Normocephalic.   Eyes:      Conjunctiva/sclera: Conjunctivae normal.      Pupils: Pupils are equal, round, and reactive to light.   Neck:      Thyroid: No thyromegaly.   Cardiovascular:      Rate and Rhythm: Normal rate and regular rhythm.      Heart sounds: Normal heart sounds.   Pulmonary:      Effort: Pulmonary effort is normal.      Breath sounds: Normal breath sounds. No wheezing.   Musculoskeletal:         General: Normal range of motion.      Right shoulder: Normal.      Right upper arm: No swelling, deformity or tenderness.      Cervical back: Normal range of motion and neck supple. Spasms and tenderness present. No deformity. Normal range of motion.   Lymphadenopathy:      Cervical: No cervical adenopathy.   Skin:     General: Skin is warm and dry.   Neurological:      Mental Status: She is alert and oriented to person, place, and time.   Psychiatric:         Thought Content: Thought content normal.          ACE III MINI        Results Review:    None    CMP:  Lab Results    Component Value Date    BUN 13 04/12/2021    CREATININE 0.67 04/12/2021    EGFRIFNONA 89 04/12/2021    BCR 19.4 04/12/2021     04/12/2021    K 4.4 04/12/2021    CO2 25.3 04/12/2021    CALCIUM 9.4 04/12/2021    ALBUMIN 4.70 04/12/2021    BILITOT 0.5 04/12/2021    ALKPHOS 59 04/12/2021    AST 27 04/12/2021    ALT 21 04/12/2021     HbA1c:  Lab Results   Component Value Date    HGBA1C 4.80 09/14/2020    HGBA1C 5.43 02/04/2020     Microalbumin:  No results found for: MICROALBUR, POCMALB, POCCREAT  Lipid Panel  Lab Results   Component Value Date    CHOL 246 (H) 04/12/2021    TRIG 77 04/12/2021    HDL 84 (H) 04/12/2021     (H) 04/12/2021    AST 27 04/12/2021    ALT 21 04/12/2021       Medication Review: Medications reviewed and noted  Patient Instructions   Problem List Items Addressed This Visit        Cardiac and Vasculature    Hypercholesterolemia - Primary    Overview     4/14/2021 Agata Miner MD    Start pravastatin low dose every evening. Continue low fat healthy diet and regular walking.    New medication added today. Benefits and possible side effects discussed. Patient verbalized understanding.           Relevant Medications    pravastatin (PRAVACHOL) 10 MG tablet    Other Relevant Orders    Comprehensive Metabolic Panel (Completed)    CBC & Differential (Completed)    Lipid Panel (Completed)    TSH (Completed)    Homocysteine (Completed)    Vitamin B12 (Completed)       Endocrine and Metabolic    Homocysteinemia (CMS/HCC)    Overview     4/14/2021 Agata Miner MD    Continue l-methylfolate daily.         Vitamin D deficiency    Overview     4/14/2021 Agata Miner MD    Continue calcium with vitamin D.         Relevant Orders    Vitamin D 25 Hydroxy (Completed)    RESOLVED: Zinc deficiency    Relevant Orders    Zinc (Completed)       Genitourinary and Reproductive     Atrophic vaginitis    Overview     4/14/2021 Agata Miner MD    Continue vagifem at bedtime  2-3 nights a  week.         Relevant Medications    estradiol (VAGIFEM) 10 MCG tablet vaginal tablet (Start on 4/15/2021)       Musculoskeletal and Injuries    Pain of right upper arm (Chronic)    Overview     4/14/2021 Agata Miner MD    Right upper arm pain radiating from the neck spasm.    See plan above.         Upper back pain on right side (Chronic)    Overview     4/14/2021 Agata Miner MD    Neck stretches were given and explained today.  She will do some of them throughout the day.  Use moist heat to relax tight muscles.  May take Advil with food as needed.  Continue rolling the tennis ball on the upper back.             Cervicalgia    Overview     4/14/2021 Agata Miner MD    Neck stretches were given and explained today.  She will do some of them throughout the day.  Use moist heat to relax tight muscles.  May take Advil with food as needed.  Continue rolling the tennis ball on the upper back.            Sleep    Insomnia    Overview     4/14/2021 Agata Miner MD    Continue melatonin with calcium magnesium tablets and probiotic every evening.  Continue to reserve Ambien for specific circumstances.    Continue good sleep hygiene including going to bed at the same time and getting up at the same time every day, going to bed early enough to get 7 or 8 hours in bed, reading and relaxing before bedtime, and avoiding the TV, computer, phone, iPad close to bedtime.  Avoiding eating or drinking wine close to bedtime also helps.           Relevant Medications    zolpidem (AMBIEN) 5 MG tablet             Diagnosis Plan   1. Hypercholesterolemia  Comprehensive Metabolic Panel    CBC & Differential    Lipid Panel    TSH    Homocysteine    Vitamin B12    pravastatin (PRAVACHOL) 10 MG tablet   2. Upper back pain on right side     3. Cervicalgia     4. Pain of right upper arm     5. Primary insomnia     6. Homocysteinemia (CMS/HCC)     7. Vitamin D deficiency  Vitamin D 25 Hydroxy   8. Atrophic vaginitis   estradiol (VAGIFEM) 10 MCG tablet vaginal tablet   9. Zinc deficiency  Zinc           Plan of care reviewed with patient at the conclusion of today's visit. Education was provided regarding diagnosis, management, and any prescribed or recommended OTC medications.Patient verbalizes understanding of and agreement with management plan.         Agata Miner MD

## 2021-04-14 NOTE — PATIENT INSTRUCTIONS
Patient Instructions   Problem List Items Addressed This Visit        Cardiac and Vasculature    Hypercholesterolemia - Primary    Overview     4/14/2021 Agata Miner MD    Start pravastatin low dose every evening. Continue low fat healthy diet and regular walking.    New medication added today. Benefits and possible side effects discussed. Patient verbalized understanding.           Relevant Medications    pravastatin (PRAVACHOL) 10 MG tablet    Other Relevant Orders    Comprehensive Metabolic Panel (Completed)    CBC & Differential (Completed)    Lipid Panel (Completed)    TSH (Completed)    Homocysteine (Completed)    Vitamin B12 (Completed)       Endocrine and Metabolic    Homocysteinemia (CMS/HCC)    Overview     4/14/2021 Agata Miner MD    Continue l-methylfolate daily.         Vitamin D deficiency    Overview     4/14/2021 Agata Miner MD    Continue calcium with vitamin D.         Relevant Orders    Vitamin D 25 Hydroxy (Completed)    RESOLVED: Zinc deficiency    Relevant Orders    Zinc (Completed)       Genitourinary and Reproductive     Atrophic vaginitis    Overview     4/14/2021 Agata Miner MD    Continue vagifem at bedtime  2-3 nights a week.         Relevant Medications    estradiol (VAGIFEM) 10 MCG tablet vaginal tablet (Start on 4/15/2021)       Musculoskeletal and Injuries    Pain of right upper arm (Chronic)    Overview     4/14/2021 Agata Miner MD    Right upper arm pain radiating from the neck spasm.    See plan above.         Upper back pain on right side (Chronic)    Overview     4/14/2021 Agata Miner MD    Neck stretches were given and explained today.  She will do some of them throughout the day.  Use moist heat to relax tight muscles.  May take Advil with food as needed.  Continue rolling the tennis ball on the upper back.             Cervicalgia    Overview     4/14/2021 Agata Miner MD    Neck stretches were given and explained today.  She will do  some of them throughout the day.  Use moist heat to relax tight muscles.  May take Advil with food as needed.  Continue rolling the tennis ball on the upper back.            Sleep    Insomnia    Overview     4/14/2021 Agata Miner MD    Continue melatonin with calcium magnesium tablets and probiotic every evening.  Continue to reserve Ambien for specific circumstances.    Continue good sleep hygiene including going to bed at the same time and getting up at the same time every day, going to bed early enough to get 7 or 8 hours in bed, reading and relaxing before bedtime, and avoiding the TV, computer, phone, iPad close to bedtime.  Avoiding eating or drinking wine close to bedtime also helps.           Relevant Medications    zolpidem (AMBIEN) 5 MG tablet

## 2021-04-21 ENCOUNTER — OFFICE VISIT (OUTPATIENT)
Dept: INTERNAL MEDICINE | Facility: CLINIC | Age: 66
End: 2021-04-21

## 2021-04-21 VITALS
HEART RATE: 74 BPM | HEIGHT: 60 IN | OXYGEN SATURATION: 100 % | DIASTOLIC BLOOD PRESSURE: 76 MMHG | WEIGHT: 127.2 LBS | TEMPERATURE: 96.9 F | SYSTOLIC BLOOD PRESSURE: 130 MMHG | BODY MASS INDEX: 24.97 KG/M2

## 2021-04-21 DIAGNOSIS — E78.00 HYPERCHOLESTEROLEMIA: ICD-10-CM

## 2021-04-21 DIAGNOSIS — M25.552 HIP PAIN, LEFT: Chronic | ICD-10-CM

## 2021-04-21 DIAGNOSIS — M54.9 UPPER BACK PAIN ON RIGHT SIDE: Chronic | ICD-10-CM

## 2021-04-21 DIAGNOSIS — R22.1 NECK FULLNESS: Primary | ICD-10-CM

## 2021-04-21 DIAGNOSIS — M54.2 CERVICALGIA: ICD-10-CM

## 2021-04-21 PROCEDURE — 99214 OFFICE O/P EST MOD 30 MIN: CPT | Performed by: INTERNAL MEDICINE

## 2021-04-21 NOTE — PROGRESS NOTES
[facility]     Gricelda Matos  1955   [id number]      Patient Care Team:  Agata Miner MD as PCP - General  Agata Miner MD as PCP - Family Medicine    Chief Complaint   Patient presents with   • Lymph node     in neck, swollen, dentist suggested she be seen   • Hip Pain     L hip replacement, having some pain            HPI  Patient is a 65 y.o. female presents with L neck fullness felt by her dentist, Dr. Justa Hare last week.  Severity  mild.  Symptoms are associated with nothing. Pertinent negatives no pain/redness/fever/chills/sore throat/trouble swallowing.   Symptoms are aggravated by Dr. Hare felt it when patient swallowed.   Symptoms improve with nothing.      HPI  Left hip with mild pain for about a month.  Status post hip replacement.  Pain is not in the trochanteric area or anterior groin.  It is up over the anterior iliac crest.  Feels it mainly when walking.  It does not cause her to stop walking or even to slow down. No know injury.     CHRONIC CONDITIONS  Stable.  Feeling very well.  Neck pain and tightness is improving with stretches and moist heat.  Upper back pain on the right is improving with using the tennis ball and getting massages.    She started the pravastatin for LDL cholesterol of 149 and is having no side effects.  She is very pleased.    Past Medical History:   Diagnosis Date   • Abdominal pain 2013    Dr. 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 Romero.  Follow-up with him as planned.   • Glossitis 4/29/2019   •  Heart murmur     No murmur noted for years.   • Hormone deficiency     Dr. Rod did hormone testing-she took HRT for about 6 months   • Mitral valve prolapse     Diagnosed when she was younger.  Asymptomatic.  Stress echo done by Dr.Avichai Liang in  was normal.   • Nocturnal cough 2019   • OA (osteoarthritis) 2016    severe OA of R hip (L hip mild)   • Open wound of finger with complication 2019   • Ovarian cyst    • Skin cancer     basal cell skin cancers on back   • Spine disorder     Lumbar spine degenerative changes/mild stenosis/moderate facet disease   • Zinc deficiency 2019       Past Surgical History:   Procedure Laterality Date   •  SECTION      x3; , , &    • COLONOSCOPY     • COLPOSCOPY  2004    by Dr. Bustos   • ENDOSCOPY      colonoscopy     • HIP ARTHROPLASTY Right 2016   • TONSILLECTOMY         Family History   Problem Relation Age of Onset   • Hypertension Mother    • Hypertension Father    • Cancer Father         Bladder   • Colon polyps Father    • Breast cancer Paternal Grandmother    • Colon cancer Maternal Grandmother    • Hypertension Maternal Grandmother    • Coronary artery disease Maternal Grandfather 65         of MI at 65   • Other Daughter 28        MTH-FR gene mutation       Social History     Socioeconomic History   • Marital status:      Spouse name: Not on file   • Number of children: Not on file   • Years of education: Not on file   • Highest education level: Not on file   Tobacco Use   • Smoking status: Former Smoker     Packs/day: 0.50     Years: 4.00     Pack years: 2.00     Types: Cigarettes     Quit date:      Years since quittin.3   • Smokeless tobacco: Never Used   • Tobacco comment: Quit    Substance and Sexual Activity   • Alcohol use: Yes     Alcohol/week: 1.0 standard drinks     Types: 1 Glasses of wine per week     Comment: daILY   • Drug use: No   • Sexual activity: Defer  "      Allergies   Allergen Reactions   • Contrast Dye Hives   • Cyclobenzaprine Unknown (See Comments)     Unknown   • Naproxen Sodium Other (See Comments)     Excessive bruising   Aleive        Review of Systems:     Review of Systems   Constitutional: Negative for chills, fatigue and fever.   HENT: Negative for congestion, ear pain, sinus pressure, sore throat, swollen glands, trouble swallowing and voice change.    Respiratory: Negative for cough, chest tightness, shortness of breath and wheezing.    Cardiovascular: Negative for chest pain, palpitations and leg swelling.   Gastrointestinal: Negative for abdominal pain, blood in stool and constipation.   Musculoskeletal: Positive for arthralgias.   Skin: Negative for color change.   Allergic/Immunologic: Negative for environmental allergies.   Neurological: Negative for dizziness, speech difficulty and headache.   Psychiatric/Behavioral: Negative for decreased concentration. The patient is not nervous/anxious.        Vital Signs  Vitals:    04/21/21 0934   BP: 130/76   BP Location: Left arm   Patient Position: Sitting   Cuff Size: Adult   Pulse: 74   Temp: 96.9 °F (36.1 °C)   TempSrc: Infrared   SpO2: 100%   Weight: 57.7 kg (127 lb 3.2 oz)   Height: 152.4 cm (60\")   PainSc: 0-No pain     Body mass index is 24.84 kg/m².      Current Outpatient Medications:   •  Calcium Carbonate-Vitamin D (CALTRATE 600+D PO), Take  by mouth., Disp: , Rfl:   •  calcium polycarbophil (KONSYL FIBER) 625 MG tablet, Konsyl Fiber  Daily, Disp: , Rfl:   •  Calcium-Magnesium-Vitamin D (CALCIUM MAGNESIUM PO), Take  by mouth Every Evening., Disp: , Rfl:   •  estradiol (VAGIFEM) 10 MCG tablet vaginal tablet, Insert 1 tablet into the vagina 2 (Two) Times a Week., Disp: 60 tablet, Rfl: 3  •  l-methylfolate 15 MG tablet tablet, Take 1 tablet by mouth Daily., Disp: 90 tablet, Rfl: 3  •  melatonin 5 MG tablet tablet, Take 5 mg by mouth At Night As Needed., Disp: , Rfl:   •  mupirocin (BACTROBAN) 2 " % ointment, prn, Disp: , Rfl: 0  •  Nutritional Supplements (JUICE PLUS FIBRE PO), Take 6 capsules by mouth Daily., Disp: , Rfl:   •  phenazopyridine (PYRIDIUM) 200 MG tablet, Take 1 tablet by mouth 3 (Three) Times a Day As Needed for Bladder Spasms., Disp: 9 tablet, Rfl: 0  •  Polyethylene Glycol 3350 (MIRALAX PO), Take  by mouth. 3 times a week, Disp: , Rfl:   •  pravastatin (PRAVACHOL) 10 MG tablet, Take 1 tablet by mouth Every Night., Disp: 90 tablet, Rfl: 1  •  Probiotic Product (PROBIOTIC PEARLS ADVANTAGE PO), Take 1 capsule by mouth Daily., Disp: , Rfl:   •  zolpidem (AMBIEN) 5 MG tablet, Take 1 tablet by mouth At Night As Needed for Sleep., Disp: 10 tablet, Rfl: 0    Physical Exam:    Physical Exam  Vitals and nursing note reviewed.   Constitutional:       Appearance: She is well-developed.   HENT:      Head: Normocephalic.   Eyes:      Conjunctiva/sclera: Conjunctivae normal.      Pupils: Pupils are equal, round, and reactive to light.   Neck:      Thyroid: No thyroid mass, thyromegaly or thyroid tenderness.      Vascular: No carotid bruit or JVD.      Trachea: Trachea normal.     Cardiovascular:      Rate and Rhythm: Normal rate and regular rhythm.      Heart sounds: Normal heart sounds.   Pulmonary:      Effort: Pulmonary effort is normal.      Breath sounds: Normal breath sounds. No wheezing.   Musculoskeletal:         General: Normal range of motion.      Cervical back: Normal range of motion and neck supple.      Right hip: No deformity or tenderness. Normal range of motion.      Left hip: No deformity or tenderness. Normal range of motion.      Comments: Well-healed scars from remote total hip replacements.   Lymphadenopathy:      Cervical: No cervical adenopathy.   Skin:     General: Skin is warm and dry.   Neurological:      Mental Status: She is alert and oriented to person, place, and time.   Psychiatric:         Thought Content: Thought content normal.          ACE III MINI        Results Review:     None    CMP:  Lab Results   Component Value Date    BUN 13 04/12/2021    CREATININE 0.67 04/12/2021    EGFRIFNONA 89 04/12/2021    BCR 19.4 04/12/2021     04/12/2021    K 4.4 04/12/2021    CO2 25.3 04/12/2021    CALCIUM 9.4 04/12/2021    ALBUMIN 4.70 04/12/2021    BILITOT 0.5 04/12/2021    ALKPHOS 59 04/12/2021    AST 27 04/12/2021    ALT 21 04/12/2021     HbA1c:  Lab Results   Component Value Date    HGBA1C 4.80 09/14/2020    HGBA1C 5.43 02/04/2020     Microalbumin:  No results found for: MICROALBUR, POCMALB, POCCREAT  Lipid Panel  Lab Results   Component Value Date    CHOL 246 (H) 04/12/2021    TRIG 77 04/12/2021    HDL 84 (H) 04/12/2021     (H) 04/12/2021    AST 27 04/12/2021    ALT 21 04/12/2021       Medication Review: Medications reviewed and noted  Patient Instructions   Problem List Items Addressed This Visit        Cardiac and Vasculature    Hypercholesterolemia    Overview     4/22/2021 Agata Miner MD    Continue pravastatin low dose every evening. Continue low fat healthy diet and regular walking.             Relevant Medications    pravastatin (PRAVACHOL) 10 MG tablet    Other Relevant Orders    Lipid Panel    Comprehensive Metabolic Panel    C-reactive Protein    Sedimentation Rate       ENT    Neck fullness - Primary    Overview     4/21/2021 Agata Miner MD    L neck fullness noted by her dentist when patient swallowed.  Probably tight ligaments related to her cervicalgia, but we need to rule out lymphadenopathy.    CT of the soft tissue of the neck ordered.         Relevant Orders    CT Soft Tissue Neck Without Contrast       Musculoskeletal and Injuries    Hip pain, left (Chronic)    Overview     4/21/2021 Agata Miner MD    Area of pain is over the anterior iliac crest.  Muscular strain.    Use moist heat pack. Do more stretches before walking.          Upper back pain on right side (Chronic)    Overview     4/21/2021 Agata Miner MD    Continue neck stretches  throughout the day.  Use moist heat to relax tight muscles.  May take Advil with food as needed.  Continue rolling the tennis ball on the upper back.  Continue getting therapeutic massages.             Cervicalgia    Overview     4/21/2021 Agata Miner MD    Continue neck stretches throughout the day.  Use moist heat to relax tight muscles.  May take Advil with food as needed.  Continue rolling the tennis ball on the upper back.  Continue getting therapeutic massages.                  Diagnosis Plan   1. Neck fullness  CT Soft Tissue Neck Without Contrast   2. Hip pain, left     3. Cervicalgia     4. Upper back pain on right side     5. Hypercholesterolemia  Lipid Panel    Comprehensive Metabolic Panel    C-reactive Protein    Sedimentation Rate           Plan of care reviewed with patient at the conclusion of today's visit. Education was provided regarding diagnosis, management, and any prescribed or recommended OTC medications.Patient verbalizes understanding of and agreement with management plan.         Agata Miner MD

## 2021-04-22 NOTE — PATIENT INSTRUCTIONS
Patient Instructions   Problem List Items Addressed This Visit        Cardiac and Vasculature    Hypercholesterolemia    Overview     4/22/2021 Agata Miner MD    Continue pravastatin low dose every evening. Continue low fat healthy diet and regular walking.             Relevant Medications    pravastatin (PRAVACHOL) 10 MG tablet    Other Relevant Orders    Lipid Panel    Comprehensive Metabolic Panel    C-reactive Protein    Sedimentation Rate       ENT    Neck fullness - Primary    Overview     4/21/2021 Agata Miner MD    L neck fullness noted by her dentist when patient swallowed.  Probably tight ligaments related to her cervicalgia, but we need to rule out lymphadenopathy.    CT of the soft tissue of the neck ordered.         Relevant Orders    CT Soft Tissue Neck Without Contrast       Musculoskeletal and Injuries    Hip pain, left (Chronic)    Overview     4/21/2021 Agata Miner MD    Area of pain is over the anterior iliac crest.  Muscular strain.    Use moist heat pack. Do more stretches before walking.          Upper back pain on right side (Chronic)    Overview     4/21/2021 Agata Miner MD    Continue neck stretches throughout the day.  Use moist heat to relax tight muscles.  May take Advil with food as needed.  Continue rolling the tennis ball on the upper back.  Continue getting therapeutic massages.             Cervicalgia    Overview     4/21/2021 Agata Miner MD    Continue neck stretches throughout the day.  Use moist heat to relax tight muscles.  May take Advil with food as needed.  Continue rolling the tennis ball on the upper back.  Continue getting therapeutic massages.

## 2021-06-16 DIAGNOSIS — E04.1 THYROID NODULE: Primary | ICD-10-CM

## 2021-06-18 ENCOUNTER — LAB (OUTPATIENT)
Dept: LAB | Facility: HOSPITAL | Age: 66
End: 2021-06-18

## 2021-06-18 DIAGNOSIS — E78.00 HYPERCHOLESTEROLEMIA: ICD-10-CM

## 2021-06-18 LAB
ALBUMIN SERPL-MCNC: 4.7 G/DL (ref 3.5–5.2)
ALBUMIN/GLOB SERPL: 2.5 G/DL
ALP SERPL-CCNC: 58 U/L (ref 39–117)
ALT SERPL W P-5'-P-CCNC: 24 U/L (ref 1–33)
ANION GAP SERPL CALCULATED.3IONS-SCNC: 10.1 MMOL/L (ref 5–15)
AST SERPL-CCNC: 23 U/L (ref 1–32)
BILIRUB SERPL-MCNC: 0.5 MG/DL (ref 0–1.2)
BUN SERPL-MCNC: 16 MG/DL (ref 8–23)
BUN/CREAT SERPL: 20 (ref 7–25)
CALCIUM SPEC-SCNC: 9.1 MG/DL (ref 8.6–10.5)
CHLORIDE SERPL-SCNC: 101 MMOL/L (ref 98–107)
CHOLEST SERPL-MCNC: 195 MG/DL (ref 0–200)
CO2 SERPL-SCNC: 25.9 MMOL/L (ref 22–29)
CREAT SERPL-MCNC: 0.8 MG/DL (ref 0.57–1)
CRP SERPL-MCNC: <0.3 MG/DL (ref 0–0.5)
ERYTHROCYTE [SEDIMENTATION RATE] IN BLOOD: 11 MM/HR (ref 0–30)
GFR SERPL CREATININE-BSD FRML MDRD: 72 ML/MIN/1.73
GLOBULIN UR ELPH-MCNC: 1.9 GM/DL
GLUCOSE SERPL-MCNC: 85 MG/DL (ref 65–99)
HDLC SERPL-MCNC: 72 MG/DL (ref 40–60)
LDLC SERPL CALC-MCNC: 113 MG/DL (ref 0–100)
LDLC/HDLC SERPL: 1.56 {RATIO}
POTASSIUM SERPL-SCNC: 4.1 MMOL/L (ref 3.5–5.2)
PROT SERPL-MCNC: 6.6 G/DL (ref 6–8.5)
SODIUM SERPL-SCNC: 137 MMOL/L (ref 136–145)
TRIGL SERPL-MCNC: 55 MG/DL (ref 0–150)
VLDLC SERPL-MCNC: 10 MG/DL (ref 5–40)

## 2021-06-18 PROCEDURE — 85652 RBC SED RATE AUTOMATED: CPT

## 2021-06-18 PROCEDURE — 80061 LIPID PANEL: CPT

## 2021-06-18 PROCEDURE — 80053 COMPREHEN METABOLIC PANEL: CPT

## 2021-06-18 PROCEDURE — 86140 C-REACTIVE PROTEIN: CPT

## 2021-07-06 ENCOUNTER — HOSPITAL ENCOUNTER (OUTPATIENT)
Dept: ULTRASOUND IMAGING | Facility: HOSPITAL | Age: 66
Discharge: HOME | End: 2021-07-06
Admitting: INTERNAL MEDICINE

## 2021-07-06 DIAGNOSIS — E04.1 THYROID NODULE: ICD-10-CM

## 2021-07-06 PROCEDURE — 76536 US EXAM OF HEAD AND NECK: CPT

## 2021-07-14 ENCOUNTER — TELEPHONE (OUTPATIENT)
Dept: INTERNAL MEDICINE | Facility: CLINIC | Age: 66
End: 2021-07-14

## 2021-07-14 NOTE — TELEPHONE ENCOUNTER
DOMINGO FROM [facility] CALLED FOR RECENT LAB RESULTS OF BUN AND CREATININE.    FAXED COPY OF CMP -[phone]

## 2021-09-10 DIAGNOSIS — E78.00 HYPERCHOLESTEROLEMIA: ICD-10-CM

## 2021-09-10 RX ORDER — PRAVASTATIN SODIUM 10 MG
10 TABLET ORAL NIGHTLY
Qty: 60 TABLET | Refills: 1 | Status: SHIPPED | OUTPATIENT
Start: 2021-09-10 | End: 2022-01-19 | Stop reason: SDUPTHER

## 2021-10-14 ENCOUNTER — LAB (OUTPATIENT)
Dept: LAB | Facility: HOSPITAL | Age: 66
End: 2021-10-14

## 2021-10-14 DIAGNOSIS — E55.9 VITAMIN D DEFICIENCY: ICD-10-CM

## 2021-10-14 DIAGNOSIS — E78.00 HYPERCHOLESTEROLEMIA: ICD-10-CM

## 2021-10-14 LAB
25(OH)D3 SERPL-MCNC: 49.1 NG/ML
ALBUMIN SERPL-MCNC: 4.7 G/DL (ref 3.5–5.2)
ALBUMIN/GLOB SERPL: 2.1 G/DL
ALP SERPL-CCNC: 58 U/L (ref 39–117)
ALT SERPL W P-5'-P-CCNC: 23 U/L (ref 1–33)
ANION GAP SERPL CALCULATED.3IONS-SCNC: 12.4 MMOL/L (ref 5–15)
AST SERPL-CCNC: 24 U/L (ref 1–32)
BACTERIA UR QL AUTO: ABNORMAL /HPF
BASOPHILS # BLD AUTO: 0.03 10*3/MM3 (ref 0–0.2)
BASOPHILS NFR BLD AUTO: 0.6 % (ref 0–1.5)
BILIRUB SERPL-MCNC: 0.2 MG/DL (ref 0–1.2)
BILIRUB UR QL STRIP: NEGATIVE
BUN SERPL-MCNC: 21 MG/DL (ref 8–23)
BUN/CREAT SERPL: 29.2 (ref 7–25)
CALCIUM SPEC-SCNC: 9.5 MG/DL (ref 8.6–10.5)
CHLORIDE SERPL-SCNC: 101 MMOL/L (ref 98–107)
CHOLEST SERPL-MCNC: 196 MG/DL (ref 0–200)
CLARITY UR: CLEAR
CO2 SERPL-SCNC: 26.6 MMOL/L (ref 22–29)
COLOR UR: YELLOW
CREAT SERPL-MCNC: 0.72 MG/DL (ref 0.57–1)
DEPRECATED RDW RBC AUTO: 39.9 FL (ref 37–54)
EOSINOPHIL # BLD AUTO: 0.01 10*3/MM3 (ref 0–0.4)
EOSINOPHIL NFR BLD AUTO: 0.2 % (ref 0.3–6.2)
ERYTHROCYTE [DISTWIDTH] IN BLOOD BY AUTOMATED COUNT: 12.8 % (ref 12.3–15.4)
GFR SERPL CREATININE-BSD FRML MDRD: 81 ML/MIN/1.73
GLOBULIN UR ELPH-MCNC: 2.2 GM/DL
GLUCOSE SERPL-MCNC: 90 MG/DL (ref 65–99)
GLUCOSE UR STRIP-MCNC: NEGATIVE MG/DL
HCT VFR BLD AUTO: 40 % (ref 34–46.6)
HCYS SERPL-MCNC: 14.9 UMOL/L (ref 0–15)
HDLC SERPL-MCNC: 68 MG/DL (ref 40–60)
HGB BLD-MCNC: 13.7 G/DL (ref 12–15.9)
HGB UR QL STRIP.AUTO: NEGATIVE
HYALINE CASTS UR QL AUTO: ABNORMAL /LPF
IMM GRANULOCYTES # BLD AUTO: 0.01 10*3/MM3 (ref 0–0.05)
IMM GRANULOCYTES NFR BLD AUTO: 0.2 % (ref 0–0.5)
KETONES UR QL STRIP: NEGATIVE
LDLC SERPL CALC-MCNC: 110 MG/DL (ref 0–100)
LDLC/HDLC SERPL: 1.59 {RATIO}
LEUKOCYTE ESTERASE UR QL STRIP.AUTO: NEGATIVE
LYMPHOCYTES # BLD AUTO: 1.62 10*3/MM3 (ref 0.7–3.1)
LYMPHOCYTES NFR BLD AUTO: 29.8 % (ref 19.6–45.3)
MCH RBC QN AUTO: 29.8 PG (ref 26.6–33)
MCHC RBC AUTO-ENTMCNC: 34.3 G/DL (ref 31.5–35.7)
MCV RBC AUTO: 87.1 FL (ref 79–97)
MONOCYTES # BLD AUTO: 0.63 10*3/MM3 (ref 0.1–0.9)
MONOCYTES NFR BLD AUTO: 11.6 % (ref 5–12)
NEUTROPHILS NFR BLD AUTO: 3.14 10*3/MM3 (ref 1.7–7)
NEUTROPHILS NFR BLD AUTO: 57.6 % (ref 42.7–76)
NITRITE UR QL STRIP: NEGATIVE
NRBC BLD AUTO-RTO: 0 /100 WBC (ref 0–0.2)
PH UR STRIP.AUTO: 7 [PH] (ref 5–8)
PLATELET # BLD AUTO: 216 10*3/MM3 (ref 140–450)
PMV BLD AUTO: 12.5 FL (ref 6–12)
POTASSIUM SERPL-SCNC: 4.5 MMOL/L (ref 3.5–5.2)
PROT SERPL-MCNC: 6.9 G/DL (ref 6–8.5)
PROT UR QL STRIP: NEGATIVE
RBC # BLD AUTO: 4.59 10*6/MM3 (ref 3.77–5.28)
RBC # UR: ABNORMAL /HPF
REF LAB TEST METHOD: ABNORMAL
SODIUM SERPL-SCNC: 140 MMOL/L (ref 136–145)
SP GR UR STRIP: 1.02 (ref 1–1.03)
SQUAMOUS #/AREA URNS HPF: ABNORMAL /HPF
TRIGL SERPL-MCNC: 100 MG/DL (ref 0–150)
TSH SERPL DL<=0.05 MIU/L-ACNC: 1.88 UIU/ML (ref 0.27–4.2)
UROBILINOGEN UR QL STRIP: NORMAL
VLDLC SERPL-MCNC: 18 MG/DL (ref 5–40)
WBC # BLD AUTO: 5.44 10*3/MM3 (ref 3.4–10.8)
WBC UR QL AUTO: ABNORMAL /HPF

## 2021-10-14 PROCEDURE — 80053 COMPREHEN METABOLIC PANEL: CPT

## 2021-10-14 PROCEDURE — 85025 COMPLETE CBC W/AUTO DIFF WBC: CPT

## 2021-10-14 PROCEDURE — 81001 URINALYSIS AUTO W/SCOPE: CPT

## 2021-10-14 PROCEDURE — 80061 LIPID PANEL: CPT

## 2021-10-14 PROCEDURE — 82306 VITAMIN D 25 HYDROXY: CPT

## 2021-10-14 PROCEDURE — 84443 ASSAY THYROID STIM HORMONE: CPT

## 2021-10-14 PROCEDURE — 83090 ASSAY OF HOMOCYSTEINE: CPT

## 2021-10-18 ENCOUNTER — OFFICE VISIT (OUTPATIENT)
Dept: INTERNAL MEDICINE | Facility: CLINIC | Age: 66
End: 2021-10-18

## 2021-10-18 VITALS
SYSTOLIC BLOOD PRESSURE: 116 MMHG | TEMPERATURE: 98.6 F | OXYGEN SATURATION: 97 % | BODY MASS INDEX: 25.8 KG/M2 | HEIGHT: 59 IN | WEIGHT: 128 LBS | HEART RATE: 74 BPM | DIASTOLIC BLOOD PRESSURE: 80 MMHG | RESPIRATION RATE: 20 BRPM

## 2021-10-18 DIAGNOSIS — R39.15 URINARY URGENCY: ICD-10-CM

## 2021-10-18 DIAGNOSIS — L21.9 SEBORRHEIC DERMATITIS OF SCALP: Chronic | ICD-10-CM

## 2021-10-18 DIAGNOSIS — R79.83 HOMOCYSTEINEMIA: ICD-10-CM

## 2021-10-18 DIAGNOSIS — E78.00 HYPERCHOLESTEROLEMIA: ICD-10-CM

## 2021-10-18 DIAGNOSIS — Z00.00 MEDICARE ANNUAL WELLNESS VISIT, SUBSEQUENT: Primary | ICD-10-CM

## 2021-10-18 DIAGNOSIS — K59.01 CONSTIPATION BY DELAYED COLONIC TRANSIT: ICD-10-CM

## 2021-10-18 DIAGNOSIS — Z00.00 ANNUAL PHYSICAL EXAM: ICD-10-CM

## 2021-10-18 DIAGNOSIS — E55.9 VITAMIN D DEFICIENCY: ICD-10-CM

## 2021-10-18 DIAGNOSIS — K21.9 GASTROESOPHAGEAL REFLUX DISEASE WITHOUT ESOPHAGITIS: ICD-10-CM

## 2021-10-18 DIAGNOSIS — Z23 NEED FOR VACCINATION: ICD-10-CM

## 2021-10-18 PROCEDURE — 1126F AMNT PAIN NOTED NONE PRSNT: CPT | Performed by: INTERNAL MEDICINE

## 2021-10-18 PROCEDURE — 1170F FXNL STATUS ASSESSED: CPT | Performed by: INTERNAL MEDICINE

## 2021-10-18 PROCEDURE — 93000 ELECTROCARDIOGRAM COMPLETE: CPT | Performed by: INTERNAL MEDICINE

## 2021-10-18 PROCEDURE — 99397 PER PM REEVAL EST PAT 65+ YR: CPT | Performed by: INTERNAL MEDICINE

## 2021-10-18 PROCEDURE — G0439 PPPS, SUBSEQ VISIT: HCPCS | Performed by: INTERNAL MEDICINE

## 2021-10-18 RX ORDER — CLOTRIMAZOLE AND BETAMETHASONE DIPROPIONATE 10; .5 MG/ML; MG/ML
LOTION TOPICAL 2 TIMES DAILY PRN
Qty: 30 ML | Refills: 1 | Status: SHIPPED | OUTPATIENT
Start: 2021-10-18 | End: 2022-07-13

## 2021-10-18 RX ORDER — OMEPRAZOLE 20 MG/1
20 CAPSULE, DELAYED RELEASE ORAL DAILY
COMMUNITY
End: 2022-07-13

## 2021-10-18 RX ORDER — DOCUSATE SODIUM 250 MG
250 CAPSULE ORAL DAILY
Qty: 90 CAPSULE | Refills: 1
Start: 2021-10-18 | End: 2022-07-13

## 2021-10-18 NOTE — PATIENT INSTRUCTIONS
Patient Instructions   Problem List Items Addressed This Visit        Cardiac and Vasculature    Hypercholesterolemia    Overview     10/18/2021 Agata Miner MD    Continue pravastatin low dose every evening. Continue low fat healthy diet and regular walking.             Relevant Medications    pravastatin (PRAVACHOL) 10 MG tablet    Other Relevant Orders    CBC & Differential (Completed)    Comprehensive Metabolic Panel (Completed)    Lipid Panel (Completed)    TSH (Completed)    Homocysteine (Completed)    Urinalysis With Microscopic - Urine, Clean Catch (Completed)    Lipid Panel    Comprehensive Metabolic Panel    High Sensitivity CRP       Endocrine and Metabolic    Homocysteinemia    Overview     10/18/2021 Agata Miner MD    Continue l-methylfolate daily.         Vitamin D deficiency    Overview     10/18/2021 Agata Miner MD    Continue calcium with vitamin D3.         Relevant Orders    Vitamin D 25 Hydroxy (Completed)       Gastrointestinal Abdominal     GERD (gastroesophageal reflux disease)    Overview     10/18/2021 Agata Miner MD    Continue omeprazole for flares of reflux symptoms as needed.      Continue to avoid eating close to bedtime. Avoid caffeine and carbonated beverages and chocolate.           Relevant Medications    omeprazole (priLOSEC) 20 MG capsule    Constipation by delayed colonic transit    Overview     10/18/2021 Agata Miner MD    She will try docusate sodium 250mg capsule daily as needed. Continue drinking plenty of fluids and taking konsul.             Genitourinary and Reproductive     Urinary urgency (Chronic)    Overview     10/18/2021 Agata Miner MD    Refer PT           Relevant Orders    Ambulatory Referral to Physical Therapy Evaluate and treat (Completed)       Health Encounters    Annual physical exam    Overview     10/18/2021 Agata Miner MD    She is up-to-date on vaccinations.    Mammogram, DEXA, and colonoscopy are up-to-date.          Medicare annual wellness visit, subsequent - Primary    Overview     10/18/2021 Agata Miner MD    She is up-to-date on vaccinations.    Mammogram, DEXA, and colonoscopy are up-to-date.            Skin    Seborrheic dermatitis of scalp (Chronic)    Overview     10/18/2021 Agata Miner MD    Use lotrisone lotion on the area of the scalp twice a day as needed.         Relevant Medications    mupirocin (BACTROBAN) 2 % ointment    clotrimazole-betamethasone (LOTRISONE) 1-0.05 % lotion      Other Visit Diagnoses     Need for vaccination

## 2021-10-18 NOTE — PROGRESS NOTES
The ABCs of the Annual Wellness Visit  Initial Medicare Wellness Visit    Chief Complaint   Patient presents with   • Medicare Wellness-Initial Visit   • Hyperlipidemia   • Heartburn     would like to discuss options due to h/o    • Skin Problem     x1mo scalp towards back left        Subjective   History of Present Illness:  Gricelda Matos is a 66 y.o. female who presents for an Initial Medicare Wellness Visit.    HPI  Scalp lesion that is itchy or irritated sometimes. A little bump.  Couple wks.     HPI  Constipation is intermittent.  Worse when traveling.  The MiraLAX tends to cause some urgency so she has quite other options there are.    HPI  Diagnosed as child with small bladder.  Now as she gets older she is noticing more urgency.  She has not had leakage. She would like to see PT.    HPI  Heartburn flared up recently when fasting for [employer] holiday.  Usually no caffeine. Omepraxole cures the symptoms.  She asks if ok to continue taking it for flares.   In past improved when stopped lettuce.  EGD in the past was benign.    CHRONIC CONDITIONS:    Hyperlipidemia-eating low fat diet and exercising.  >11,000 steps per day and pilates and yoga.      HEALTH RISK ASSESSMENT    Recent Hospitalizations:  No hospitalization(s) within the last year.    Current Medical Providers:  Patient Care Team:  Agata Miner MD as PCP - General  Agata Miner MD as PCP - Family Medicine    Smoking Status:  Social History     Tobacco Use   Smoking Status Former Smoker   • Packs/day: 0.50   • Years: 4.00   • Pack years: 2.00   • Types: Cigarettes   • Quit date:    • Years since quittin.8   Smokeless Tobacco Never Used   Tobacco Comment    Quit        Alcohol Consumption:  Social History     Substance and Sexual Activity   Alcohol Use Yes   • Alcohol/week: 1.0 standard drink   • Types: 1 Glasses of wine per week    Comment: daILY       Depression Screen:   PHQ-2/PHQ-9 Depression Screening 10/18/2021    Little interest or pleasure in doing things 0   Feeling down, depressed, or hopeless 0   Trouble falling or staying asleep, or sleeping too much 0   Feeling tired or having little energy 0   Poor appetite or overeating 0   Feeling bad about yourself - or that you are a failure or have let yourself or your family down 0   Trouble concentrating on things, such as reading the newspaper or watching television 0   Moving or speaking so slowly that other people could have noticed. Or the opposite - being so fidgety or restless that you have been moving around a lot more than usual 0   Thoughts that you would be better off dead, or of hurting yourself in some way 0   Total Score 0   If you checked off any problems, how difficult have these problems made it for you to do your work, take care of things at home, or get along with other people? Not difficult at all       Fall Risk Screen:  LIBAN Fall Risk Assessment was completed, and patient is at LOW risk for falls.Assessment completed on:10/18/2021    Health Habits and Functional and Cognitive Screening:  Functional & Cognitive Status 10/18/2021   Do you have difficulty preparing food and eating? No   Do you have difficulty bathing yourself, getting dressed or grooming yourself? No   Do you have difficulty using the toilet? No   Do you have difficulty moving around from place to place? No   Do you have trouble with steps or getting out of a bed or a chair? No   Current Diet Well Balanced Diet   Dental Exam Up to date        Dental Exam Comment 07/2021   Eye Exam Up to date        Eye Exam Comment 10/2021   Exercise (times per week) 7 times per week   Current Exercises Include (No Data)        Exercise Comment steps / weights/ pilotes / yoga   Do you need help using the phone?  No   Are you deaf or do you have serious difficulty hearing?  No   Do you need help with transportation? No   Do you need help shopping? No   Do you need help preparing meals?  No   Do you need  help with housework?  No   Do you need help with laundry? No   Do you need help taking your medications? No   Do you need help managing money? No   Do you ever drive or ride in a car without wearing a seat belt? No   Have you felt unusual stress, anger or loneliness in the last month? No   Who do you live with? Spouse   If you need help, do you have trouble finding someone available to you? No   Have you been bothered in the last four weeks by sexual problems? No   Do you have difficulty concentrating, remembering or making decisions? No       Does the patient have evidence of cognitive impairment? No    Asprin use counseling:Does not need ASA (and currently is not on it)    Age-appropriate Screening Schedule:  Refer to the list below for future screening recommendations based on patient's age, sex and/or medical conditions. Orders for these recommended tests are listed in the plan section. The patient has been provided with a written plan.      Age appropriate preventive counseling done including age appropriate vaccines,regular  Mammogram and self breast exam, pap smear, colonoscopy, regular dental visits, mental health, injury prevention such as wearing seat belt and preventing falls, healthy  nutrition, healthy weight, regular physical exercise. Alcohol use is moderate.  Tobacco history-none. Drug use-none.  STD's-not at risk.        Health Maintenance   Topic Date Due   • PAP SMEAR  05/18/2022   • LIPID PANEL  10/14/2022   • MAMMOGRAM  02/17/2023   • DXA SCAN  02/17/2023   • TDAP/TD VACCINES (3 - Td or Tdap) 10/08/2028   • INFLUENZA VACCINE  Completed   • ZOSTER VACCINE  Completed        The following portions of the patient's history were reviewed and updated as appropriate: allergies, current medications, past family history, past medical history, past social history, past surgical history and problem list.    Outpatient Medications Prior to Visit   Medication Sig Dispense Refill   • Calcium Carbonate-Vitamin  D (CALTRATE 600+D PO) Take  by mouth.     • calcium polycarbophil (KONSYL FIBER) 625 MG tablet Konsyl Fiber   Daily     • Calcium-Magnesium-Vitamin D (CALCIUM MAGNESIUM PO) Take  by mouth Every Evening.     • estradiol (VAGIFEM) 10 MCG tablet vaginal tablet Insert 1 tablet into the vagina 2 (Two) Times a Week. 60 tablet 3   • l-methylfolate 15 MG tablet tablet Take 1 tablet by mouth Daily. 90 tablet 3   • melatonin 5 MG tablet tablet Take 5 mg by mouth At Night As Needed.     • mupirocin (BACTROBAN) 2 % ointment prn  0   • omeprazole (priLOSEC) 20 MG capsule Take 20 mg by mouth Daily.     • phenazopyridine (PYRIDIUM) 200 MG tablet Take 1 tablet by mouth 3 (Three) Times a Day As Needed for Bladder Spasms. 9 tablet 0   • Polyethylene Glycol 3350 (MIRALAX PO) Take  by mouth. 3 times a week     • pravastatin (PRAVACHOL) 10 MG tablet TAKE 1 TABLET BY MOUTH EVERY NIGHT 60 tablet 1   • Probiotic Product (PROBIOTIC PEARLS ADVANTAGE PO) Take 1 capsule by mouth Daily.     • zolpidem (AMBIEN) 5 MG tablet Take 1 tablet by mouth At Night As Needed for Sleep. 10 tablet 0   • Nutritional Supplements (JUICE PLUS FIBRE PO) Take 6 capsules by mouth Daily.       No facility-administered medications prior to visit.       Patient Active Problem List   Diagnosis   • Homocysteinemia   • GERD (gastroesophageal reflux disease)   • Burning mouth syndrome   • Vitamin D deficiency   • Cervicalgia   • Hypercholesterolemia   • Constipation by delayed colonic transit   • Benign meningioma of brain (HCC)   • Leg pain, right   • Allergic rhinitis   • Bilateral hip pain   • Folate deficiency   • Former smoker   • Insomnia   • Menopause   • Ovarian cyst   • Hip pain, left   • Pap test, as part of routine gynecological examination   • Atrophic vaginitis   • Annual physical exam   • Pain of right upper arm   • Upper back pain on right side   • Neck fullness   • Urinary urgency   • Seborrheic dermatitis of scalp   • Medicare annual wellness visit,  "subsequent       Advanced Care Planning:  ACP discussion was held with the patient during this visit. Patient has an advance directive (not in EMR), copy requested.    Review of Systems   Constitutional: Negative for chills, fatigue and fever.   HENT: Negative for congestion, ear pain, hearing loss and sinus pressure.    Eyes: Negative for visual disturbance.   Respiratory: Negative for cough, chest tightness, shortness of breath and wheezing.    Cardiovascular: Negative for chest pain, palpitations and leg swelling.   Gastrointestinal: Positive for constipation. Negative for abdominal pain and blood in stool.   Endocrine: Negative for cold intolerance and heat intolerance.   Genitourinary: Positive for frequency and urgency. Negative for dysuria, hematuria and pelvic pain.   Musculoskeletal: Negative for arthralgias, back pain and gait problem.   Skin: Negative for color change and rash.   Allergic/Immunologic: Negative for environmental allergies.   Neurological: Negative for dizziness and headaches.   Hematological: Negative for adenopathy. Does not bruise/bleed easily.   Psychiatric/Behavioral: Negative for dysphoric mood, sleep disturbance and suicidal ideas. The patient is not nervous/anxious.        Compared to one year ago, the patient feels her physical health is the same.  Compared to one year ago, the patient feels her mental health is the same.    Reviewed chart for potential of high risk medication in the elderly: yes  Reviewed chart for potential of harmful drug interactions in the elderly:yes    Objective         Vitals:    10/18/21 1106   BP: 116/80   BP Location: Left arm   Patient Position: Sitting   Cuff Size: Adult   Pulse: 74   Resp: 20   Temp: 98.6 °F (37 °C)   TempSrc: Infrared   SpO2: 97%   Weight: 58.1 kg (128 lb)   Height: 150.5 cm (59.25\")   PainSc: 0-No pain     Patient's Body mass index is 25.64 kg/m². indicating that she is within normal range (BMI 18.5-24.9). No BMI management plan " needed..    Body mass index is 25.64 kg/m².  Discussed the patient's BMI with her. The BMI is in the acceptable range.    Physical Exam  Vitals and nursing note reviewed.   Constitutional:       Appearance: She is well-developed.   HENT:      Head: Normocephalic.   Eyes:      Conjunctiva/sclera: Conjunctivae normal.      Pupils: Pupils are equal, round, and reactive to light.   Neck:      Thyroid: No thyromegaly.   Cardiovascular:      Rate and Rhythm: Normal rate and regular rhythm.      Heart sounds: Normal heart sounds.   Pulmonary:      Effort: Pulmonary effort is normal.      Breath sounds: Normal breath sounds. No wheezing.   Chest:   Breasts:      Right: No inverted nipple, mass, nipple discharge, skin change or tenderness.      Left: No inverted nipple, mass, nipple discharge, skin change or tenderness.       Abdominal:      General: Bowel sounds are normal.      Palpations: Abdomen is soft.      Tenderness: There is no abdominal tenderness.   Musculoskeletal:         General: No tenderness. Normal range of motion.      Cervical back: Normal range of motion and neck supple.   Lymphadenopathy:      Cervical: No cervical adenopathy.   Skin:     General: Skin is warm and dry.      Findings: Lesion present. No rash.          Neurological:      Mental Status: She is alert and oriented to person, place, and time.      Cranial Nerves: No cranial nerve deficit.      Sensory: No sensory deficit.      Coordination: Coordination normal.      Gait: Gait normal.   Psychiatric:         Attention and Perception: Attention normal.         Mood and Affect: Mood normal.         Speech: Speech normal.         Behavior: Behavior normal.         Thought Content: Thought content normal.         Cognition and Memory: Cognition normal.         Judgment: Judgment normal.       ECG 12 Lead    Date/Time: 10/18/2021 12:50 PM  Performed by: Agata Miner MD  Authorized by: Agata Miner MD   Comparison: compared with previous  ECG   Similar to previous ECG  Rhythm: sinus rhythm  Rate: normal  BPM: 70  Conduction: conduction normal  ST Segments: ST segments normal  T Waves: T waves normal  QRS axis: normal    Clinical impression: normal ECG              Lab Results   Component Value Date    TRIG 100 10/14/2021    HDL 68 (H) 10/14/2021     (H) 10/14/2021    VLDL 18 10/14/2021        Assessment/Plan   Medicare Risks and Personalized Health Plan  CMS Preventative Services Quick Reference  Cardiovascular risk  Osteoporosis Risk    The above risks/problems have been discussed with the patient.  Pertinent information has been shared with the patient in the After Visit Summary.  Follow up plans and orders are seen below in the Assessment/Plan Section.  Patient Instructions   Problem List Items Addressed This Visit        Cardiac and Vasculature    Hypercholesterolemia    Overview     10/18/2021 Agata Minre MD    Continue pravastatin low dose every evening. Continue low fat healthy diet and regular walking.             Relevant Medications    pravastatin (PRAVACHOL) 10 MG tablet    Other Relevant Orders    CBC & Differential (Completed)    Comprehensive Metabolic Panel (Completed)    Lipid Panel (Completed)    TSH (Completed)    Homocysteine (Completed)    Urinalysis With Microscopic - Urine, Clean Catch (Completed)    Lipid Panel    Comprehensive Metabolic Panel    High Sensitivity CRP       Endocrine and Metabolic    Homocysteinemia    Overview     10/18/2021 Agata Miner MD    Continue l-methylfolate daily.         Vitamin D deficiency    Overview     10/18/2021 Agata Miner MD    Continue calcium with vitamin D3.         Relevant Orders    Vitamin D 25 Hydroxy (Completed)       Gastrointestinal Abdominal     GERD (gastroesophageal reflux disease)    Overview     10/18/2021 Agtaa Miner MD    Continue omeprazole for flares of reflux symptoms as needed.      Continue to avoid eating close to bedtime. Avoid caffeine  and carbonated beverages and chocolate.           Relevant Medications    omeprazole (priLOSEC) 20 MG capsule    Constipation by delayed colonic transit    Overview     10/18/2021 Agata Miner MD    She will try docusate sodium 250mg capsule daily as needed. Continue drinking plenty of fluids and taking konsul.             Genitourinary and Reproductive     Urinary urgency (Chronic)    Overview     10/18/2021 Agata Miner MD    Refer PT           Relevant Orders    Ambulatory Referral to Physical Therapy Evaluate and treat (Completed)       Health Encounters    Annual physical exam    Overview     10/18/2021 Agata Miner MD    She is up-to-date on vaccinations.    Mammogram, DEXA, and colonoscopy are up-to-date.         Medicare annual wellness visit, subsequent - Primary    Overview     10/18/2021 Agata Miner MD    She is up-to-date on vaccinations.    Mammogram, DEXA, and colonoscopy are up-to-date.            Skin    Seborrheic dermatitis of scalp (Chronic)    Overview     10/18/2021 Agata Miner MD    Use lotrisone lotion on the area of the scalp twice a day as needed.         Relevant Medications    mupirocin (BACTROBAN) 2 % ointment    clotrimazole-betamethasone (LOTRISONE) 1-0.05 % lotion      Other Visit Diagnoses     Need for vaccination               Follow Up:  Return in about 6 months (around 4/18/2022) for recheck fasting.     An After Visit Summary and PPPS were given to the patient.

## 2021-10-18 NOTE — PROGRESS NOTES
Preventative Annual Visit    Gricelda Matos  1955   [id number]    Patient Care Team:  Agata Miner MD as PCP - General  Agata Miner MD as PCP - Family Medicine    Chief Complaint::   Chief Complaint   Patient presents with   • Annual Exam   • Hyperlipidemia        Subjective   History of Present Illness    Gricelda Matos is a 66 y.o. female who presents for an Annual Wellness Visit.    CHRONIC CONDITIONS    Patient Active Problem List   Diagnosis   • Homocysteinemia   • GERD (gastroesophageal reflux disease)   • Burning mouth syndrome   • Vitamin D deficiency   • Cervicalgia   • Hypercholesterolemia   • Constipation by delayed colonic transit   • Benign meningioma of brain (HCC)   • Leg pain, right   • Allergic rhinitis   • Bilateral hip pain   • Folate deficiency   • Former smoker   • Insomnia   • Menopause   • Ovarian cyst   • Hip pain, left   • Pap test, as part of routine gynecological examination   • Atrophic vaginitis   • Annual physical exam   • Pain of right upper arm   • Upper back pain on right side   • Neck fullness        Past Medical History:   Diagnosis Date   • Abdominal pain 2013    Dr. Garvin felt from too much raw vegetables   • Arthritis    • Atypical squamous cells of undetermined significance (ASCUS) on Papanicolaou smear of cervix 2004   • Brain hemangioma (CMS/HCC) 2014    benign-(incidental finding on MRI)   • Burning tongue syndrome 2015   • Cough    • Elevated homocysteine     MTHFR pos for 2 copies of R0130Z mutation,neg for C677T mutation   • Fungal toenail infection 4/29/2019 2/4/2020 Agata Miner MD  Continue fluconazole weekly per Dr. Romero.  Follow-up with him as planned.   • Glossitis 4/29/2019   • Heart murmur     No murmur noted for years.   • Hormone deficiency 2013    Dr. Rod did hormone testing-she took HRT for about 6 months   • Mitral valve prolapse     Diagnosed when she was younger.  Asymptomatic.  Stress echo done by Dr.Avichai Liang  in  was normal.   • Nocturnal cough 2019   • OA (osteoarthritis) 2016    severe OA of R hip (L hip mild)   • Open wound of finger with complication 2019   • Ovarian cyst    • Skin cancer     basal cell skin cancers on back   • Spine disorder     Lumbar spine degenerative changes/mild stenosis/moderate facet disease   • Zinc deficiency 2019       Past Surgical History:   Procedure Laterality Date   •  SECTION      x3; , , &    • COLONOSCOPY     • COLPOSCOPY  2004    by Dr. Bustos   • ENDOSCOPY      colonoscopy okay    • HIP ARTHROPLASTY Right 2016   • TONSILLECTOMY         Family History   Problem Relation Age of Onset   • Hypertension Mother    • Hypertension Father    • Cancer Father         Bladder   • Colon polyps Father    • Breast cancer Paternal Grandmother    • Colon cancer Maternal Grandmother    • Hypertension Maternal Grandmother    • Coronary artery disease Maternal Grandfather 65         of MI at 65   • Other Daughter 28        MTH-FR gene mutation       Social History     Socioeconomic History   • Marital status:    Tobacco Use   • Smoking status: Former Smoker     Packs/day: 0.50     Years: 4.00     Pack years: 2.00     Types: Cigarettes     Quit date:      Years since quittin.8   • Smokeless tobacco: Never Used   • Tobacco comment: Quit    Substance and Sexual Activity   • Alcohol use: Yes     Alcohol/week: 1.0 standard drink     Types: 1 Glasses of wine per week     Comment: daILY   • Drug use: No   • Sexual activity: Defer       Allergies   Allergen Reactions   • Contrast Dye Hives   • Cyclobenzaprine Unknown (See Comments)     Unknown   • Naproxen Sodium Other (See Comments)     Excessive bruising   Aleive          Current Outpatient Medications:   •  Calcium Carbonate-Vitamin D (CALTRATE 600+D PO), Take  by mouth., Disp: , Rfl:   •  calcium polycarbophil (KONSYL FIBER) 625 MG tablet, Konsyl Fiber  Daily, Disp: , Rfl:    •  Calcium-Magnesium-Vitamin D (CALCIUM MAGNESIUM PO), Take  by mouth Every Evening., Disp: , Rfl:   •  estradiol (VAGIFEM) 10 MCG tablet vaginal tablet, Insert 1 tablet into the vagina 2 (Two) Times a Week., Disp: 60 tablet, Rfl: 3  •  l-methylfolate 15 MG tablet tablet, Take 1 tablet by mouth Daily., Disp: 90 tablet, Rfl: 3  •  melatonin 5 MG tablet tablet, Take 5 mg by mouth At Night As Needed., Disp: , Rfl:   •  mupirocin (BACTROBAN) 2 % ointment, prn, Disp: , Rfl: 0  •  Nutritional Supplements (JUICE PLUS FIBRE PO), Take 6 capsules by mouth Daily., Disp: , Rfl:   •  phenazopyridine (PYRIDIUM) 200 MG tablet, Take 1 tablet by mouth 3 (Three) Times a Day As Needed for Bladder Spasms., Disp: 9 tablet, Rfl: 0  •  Polyethylene Glycol 3350 (MIRALAX PO), Take  by mouth. 3 times a week, Disp: , Rfl:   •  pravastatin (PRAVACHOL) 10 MG tablet, TAKE 1 TABLET BY MOUTH EVERY NIGHT, Disp: 60 tablet, Rfl: 1  •  Probiotic Product (PROBIOTIC PEARLS ADVANTAGE PO), Take 1 capsule by mouth Daily., Disp: , Rfl:   •  zolpidem (AMBIEN) 5 MG tablet, Take 1 tablet by mouth At Night As Needed for Sleep., Disp: 10 tablet, Rfl: 0    Immunization History   Administered Date(s) Administered   • COVID-19 (PFIZER) 01/13/2021, 02/03/2021, 08/27/2021   • Flu Vaccine Quad PF >18YRS 10/01/2018   • Flu Vaccine Quad PF >36MO 10/17/2017, 09/01/2018   • Influenza TIV (IM) 10/17/2012, 09/16/2013   • Influenza, Unspecified 10/01/2017   • Pneumococcal Polysaccharide (PPSV23) 03/17/2020   • Shingrix 08/25/2019, 11/05/2019   • Tdap 05/03/2010, 10/08/2018   • Zostavax 05/03/2010   • flucelvax quad pfs =>4 YRS 10/26/2019, 10/09/2020        Health Maintenance Due   Topic Date Due   • HEPATITIS C SCREENING  Never done   • INFLUENZA VACCINE  08/01/2021   • ANNUAL PHYSICAL  10/15/2021        Review of Systems     Vital Signs  There were no vitals filed for this visit.  Patient's There is no height or weight on file to calculate BMI. indicating that she is  "{weight categories:60848}.    Physical Exam     Procedures     Fall Risk Screen:  LIBAN Fall Risk Assessment has not been completed.    Health Habits and Functional and Cognitive Screening:  No flowsheet data found.    Smoking Status:  Social History     Tobacco Use   Smoking Status Former Smoker   • Packs/day: 0.50   • Years: 4.00   • Pack years: 2.00   • Types: Cigarettes   • Quit date:    • Years since quittin.8   Smokeless Tobacco Never Used   Tobacco Comment    Quit 2006       Alcohol Consumption:  Social History     Substance and Sexual Activity   Alcohol Use Yes   • Alcohol/week: 1.0 standard drink   • Types: 1 Glasses of wine per week    Comment: daILY       Depression [place]  PHQ-9:    PHQ-2/PHQ-9 Depression Screening 2021   Little interest or pleasure in doing things 0   Feeling down, depressed, or hopeless 0   Total Score 0      Patient's depression is {SinglevsRecurrent:728638676} and is {MILD, MOD, SEV:30851} {WITH OR WITHOUT:05526::\"without\"} psychosis. Their depression is currently {Remission:683494057} and the condition is {improving/stable/worsenin}. This will be reassessed {plan; follow-up 2 weeks/4weeks/3months:3963564194}. F/U as described:{[facility]34187}.     ACE III MINI        Labs  Results for orders placed or performed in visit on 10/14/21   Comprehensive Metabolic Panel    Specimen: Blood   Result Value Ref Range    Glucose 90 65 - 99 mg/dL    BUN 21 8 - 23 mg/dL    Creatinine 0.72 0.57 - 1.00 mg/dL    Sodium 140 136 - 145 mmol/L    Potassium 4.5 3.5 - 5.2 mmol/L    Chloride 101 98 - 107 mmol/L    CO2 26.6 22.0 - 29.0 mmol/L    Calcium 9.5 8.6 - 10.5 mg/dL    Total Protein 6.9 6.0 - 8.5 g/dL    Albumin 4.70 3.50 - 5.20 g/dL    ALT (SGPT) 23 1 - 33 U/L    AST (SGOT) 24 1 - 32 U/L    Alkaline Phosphatase 58 39 - 117 U/L    Total Bilirubin 0.2 0.0 - 1.2 mg/dL    eGFR Non African Amer 81 >60 mL/min/1.73    Globulin 2.2 gm/dL    A/G Ratio 2.1 g/dL    " BUN/Creatinine Ratio 29.2 (H) 7.0 - 25.0    Anion Gap 12.4 5.0 - 15.0 mmol/L   Lipid Panel    Specimen: Blood   Result Value Ref Range    Total Cholesterol 196 0 - 200 mg/dL    Triglycerides 100 0 - 150 mg/dL    HDL Cholesterol 68 (H) 40 - 60 mg/dL    LDL Cholesterol  110 (H) 0 - 100 mg/dL    VLDL Cholesterol 18 5 - 40 mg/dL    LDL/HDL Ratio 1.59    TSH    Specimen: Blood   Result Value Ref Range    TSH 1.880 0.270 - 4.200 uIU/mL   Homocysteine    Specimen: Blood   Result Value Ref Range    Homocysteine, Plasma (Quant) 14.9 0.0 - 15.0 umol/L   Vitamin D 25 Hydroxy    Specimen: Blood   Result Value Ref Range    25 Hydroxy, Vitamin D 49.1 ng/ml   CBC Auto Differential    Specimen: Blood   Result Value Ref Range    WBC 5.44 3.40 - 10.80 10*3/mm3    RBC 4.59 3.77 - 5.28 10*6/mm3    Hemoglobin 13.7 12.0 - 15.9 g/dL    Hematocrit 40.0 34.0 - 46.6 %    MCV 87.1 79.0 - 97.0 fL    MCH 29.8 26.6 - 33.0 pg    MCHC 34.3 31.5 - 35.7 g/dL    RDW 12.8 12.3 - 15.4 %    RDW-SD 39.9 37.0 - 54.0 fl    MPV 12.5 (H) 6.0 - 12.0 fL    Platelets 216 140 - 450 10*3/mm3    Neutrophil % 57.6 42.7 - 76.0 %    Lymphocyte % 29.8 19.6 - 45.3 %    Monocyte % 11.6 5.0 - 12.0 %    Eosinophil % 0.2 (L) 0.3 - 6.2 %    Basophil % 0.6 0.0 - 1.5 %    Immature Grans % 0.2 0.0 - 0.5 %    Neutrophils, Absolute 3.14 1.70 - 7.00 10*3/mm3    Lymphocytes, Absolute 1.62 0.70 - 3.10 10*3/mm3    Monocytes, Absolute 0.63 0.10 - 0.90 10*3/mm3    Eosinophils, Absolute 0.01 0.00 - 0.40 10*3/mm3    Basophils, Absolute 0.03 0.00 - 0.20 10*3/mm3    Immature Grans, Absolute 0.01 0.00 - 0.05 10*3/mm3    nRBC 0.0 0.0 - 0.2 /100 WBC   Urinalysis without microscopic (no culture) - Urine, Clean Catch    Specimen: Urine, Clean Catch   Result Value Ref Range    Color, UA Yellow Yellow, Straw    Appearance, UA Clear Clear    pH, UA 7.0 5.0 - 8.0    Specific Gravity, UA 1.019 1.005 - 1.030    Glucose, UA Negative Negative    Ketones, UA Negative Negative    Bilirubin, UA Negative  Negative    Blood, UA Negative Negative    Protein, UA Negative Negative    Leuk Esterase, UA Negative Negative    Nitrite, UA Negative Negative    Urobilinogen, UA 0.2 E.U./dL 0.2 - 1.0 E.U./dL   Urinalysis, Microscopic Only - Urine, Clean Catch    Specimen: Urine, Clean Catch   Result Value Ref Range    RBC, UA 0-2 None Seen, 0-2 /HPF    WBC, UA 0-2 None Seen, 0-2 /HPF    Bacteria, UA None Seen None Seen /HPF    Squamous Epithelial Cells, UA 7-12 (A) None Seen, 0-2 /HPF    Hyaline Casts, UA 3-6 None Seen /LPF    Methodology Automated Microscopy         Assessment/Plan     Patient Self-Management and Personalized Health Advice    The patient has been provided counseling and guidance about: {; PERSONALIZED HEALTH ADVICE:88391} and preventive services including:   · {plan:27088}.  Patient Instructions   Problem List Items Addressed This Visit        Cardiac and Vasculature    Hypercholesterolemia - Primary    Overview     4/22/2021 Agata Miner MD    Continue pravastatin low dose every evening. Continue low fat healthy diet and regular walking.             Relevant Medications    pravastatin (PRAVACHOL) 10 MG tablet    Other Relevant Orders    CBC & Differential (Completed)    Comprehensive Metabolic Panel (Completed)    Lipid Panel (Completed)    TSH (Completed)    Homocysteine (Completed)    Urinalysis With Microscopic - Urine, Clean Catch (Completed)       Endocrine and Metabolic    Homocysteinemia    Overview     4/14/2021 Agata Miner MD    Continue l-methylfolate daily.         Vitamin D deficiency    Overview     4/14/2021 Agata Miner MD    Continue calcium with vitamin D.         Relevant Orders    Vitamin D 25 Hydroxy (Completed)       Health Encounters    Annual physical exam    Overview     10/16/2020 Agata Miner MD    She is up-to-date on vaccinations.    Mammogram and DEXA ordered.  She is up-to-date on colonoscopy.                  Diagnosis Plan   1. Hypercholesterolemia  CBC &  Differential    Comprehensive Metabolic Panel    Lipid Panel    TSH    Homocysteine    Urinalysis With Microscopic - Urine, Clean Catch   2. Homocysteinemia     3. Vitamin D deficiency  Vitamin D 25 Hydroxy   4. Annual physical exam         Outpatient Encounter Medications as of 10/18/2021   Medication Sig Dispense Refill   • Calcium Carbonate-Vitamin D (CALTRATE 600+D PO) Take  by mouth.     • calcium polycarbophil (KONSYL FIBER) 625 MG tablet Konsyl Fiber   Daily     • Calcium-Magnesium-Vitamin D (CALCIUM MAGNESIUM PO) Take  by mouth Every Evening.     • estradiol (VAGIFEM) 10 MCG tablet vaginal tablet Insert 1 tablet into the vagina 2 (Two) Times a Week. 60 tablet 3   • l-methylfolate 15 MG tablet tablet Take 1 tablet by mouth Daily. 90 tablet 3   • melatonin 5 MG tablet tablet Take 5 mg by mouth At Night As Needed.     • mupirocin (BACTROBAN) 2 % ointment prn  0   • Nutritional Supplements (JUICE PLUS FIBRE PO) Take 6 capsules by mouth Daily.     • phenazopyridine (PYRIDIUM) 200 MG tablet Take 1 tablet by mouth 3 (Three) Times a Day As Needed for Bladder Spasms. 9 tablet 0   • Polyethylene Glycol 3350 (MIRALAX PO) Take  by mouth. 3 times a week     • pravastatin (PRAVACHOL) 10 MG tablet TAKE 1 TABLET BY MOUTH EVERY NIGHT 60 tablet 1   • Probiotic Product (PROBIOTIC PEARLS ADVANTAGE PO) Take 1 capsule by mouth Daily.     • zolpidem (AMBIEN) 5 MG tablet Take 1 tablet by mouth At Night As Needed for Sleep. 10 tablet 0     No facility-administered encounter medications on file as of 10/18/2021.       Reviewed use of high risk medication in the elderly: {Response; yes/no/na:63}  Reviewed for potential of harmful drug interactions in the elderly: {Response; yes/no/na:63}    Age appropriate preventive counseling done including age appropriate vaccines,regular  Mammogram and self breast exam, pap smear, colonoscopy, regular dental visits, mental health, injury prevention such as wearing seat belt and preventing falls,  healthy  nutrition, healthy weight, regular physical exercise. Alcohol use is moderate.  Tobacco history-none. Drug use-none.  STD's-not at risk.    Follow Up:  No follow-ups on file.     {Time Spent (Optional):94318}    An After Visit Summary and PPPS with all of these plans were given to the patient.      Note: Part of this note may be an electronic transcription/translation of spoken language to printed text using the Dragon Dictation System.     Katelin Linares MA

## 2022-01-07 DIAGNOSIS — M79.674 PAIN OF TOE OF RIGHT FOOT: Primary | ICD-10-CM

## 2022-01-18 ENCOUNTER — LAB (OUTPATIENT)
Dept: LAB | Facility: HOSPITAL | Age: 67
End: 2022-01-18

## 2022-01-18 DIAGNOSIS — E78.00 HYPERCHOLESTEROLEMIA: ICD-10-CM

## 2022-01-18 DIAGNOSIS — M79.674 PAIN OF TOE OF RIGHT FOOT: ICD-10-CM

## 2022-01-18 LAB
ALBUMIN SERPL-MCNC: 4.9 G/DL (ref 3.5–5.2)
ALBUMIN/GLOB SERPL: 2.6 G/DL
ALP SERPL-CCNC: 62 U/L (ref 39–117)
ALT SERPL W P-5'-P-CCNC: 23 U/L (ref 1–33)
ANION GAP SERPL CALCULATED.3IONS-SCNC: 11 MMOL/L (ref 5–15)
AST SERPL-CCNC: 27 U/L (ref 1–32)
BILIRUB SERPL-MCNC: 0.6 MG/DL (ref 0–1.2)
BUN SERPL-MCNC: 19 MG/DL (ref 8–23)
BUN/CREAT SERPL: 25.3 (ref 7–25)
CALCIUM SPEC-SCNC: 9.3 MG/DL (ref 8.6–10.5)
CHLORIDE SERPL-SCNC: 102 MMOL/L (ref 98–107)
CHOLEST SERPL-MCNC: 209 MG/DL (ref 0–200)
CO2 SERPL-SCNC: 26 MMOL/L (ref 22–29)
CREAT SERPL-MCNC: 0.75 MG/DL (ref 0.57–1)
CRP SERPL-MCNC: 0.73 MG/DL (ref 0.01–0.5)
GFR SERPL CREATININE-BSD FRML MDRD: 78 ML/MIN/1.73
GLOBULIN UR ELPH-MCNC: 1.9 GM/DL
GLUCOSE SERPL-MCNC: 84 MG/DL (ref 65–99)
HDLC SERPL-MCNC: 75 MG/DL (ref 40–60)
LDLC SERPL CALC-MCNC: 122 MG/DL (ref 0–100)
LDLC/HDLC SERPL: 1.61 {RATIO}
POTASSIUM SERPL-SCNC: 4.1 MMOL/L (ref 3.5–5.2)
PROT SERPL-MCNC: 6.8 G/DL (ref 6–8.5)
SODIUM SERPL-SCNC: 139 MMOL/L (ref 136–145)
TRIGL SERPL-MCNC: 67 MG/DL (ref 0–150)
URATE SERPL-MCNC: 4.9 MG/DL (ref 2.4–5.7)
VLDLC SERPL-MCNC: 12 MG/DL (ref 5–40)

## 2022-01-18 PROCEDURE — 86141 C-REACTIVE PROTEIN HS: CPT

## 2022-01-18 PROCEDURE — 80061 LIPID PANEL: CPT

## 2022-01-18 PROCEDURE — 80053 COMPREHEN METABOLIC PANEL: CPT

## 2022-01-18 PROCEDURE — 84550 ASSAY OF BLOOD/URIC ACID: CPT

## 2022-01-19 DIAGNOSIS — E78.00 HYPERCHOLESTEROLEMIA: ICD-10-CM

## 2022-01-19 RX ORDER — PRAVASTATIN SODIUM 10 MG
20 TABLET ORAL NIGHTLY
Qty: 60 TABLET | Refills: 1
Start: 2022-01-19 | End: 2022-01-31 | Stop reason: SDUPTHER

## 2022-01-27 ENCOUNTER — OFFICE (OUTPATIENT)
Dept: URBAN - METROPOLITAN AREA CLINIC 4 | Facility: CLINIC | Age: 67
End: 2022-01-27

## 2022-01-27 DIAGNOSIS — Z80.0 FAMILY HISTORY OF MALIGNANT NEOPLASM OF DIGESTIVE ORGANS: ICD-10-CM

## 2022-01-27 DIAGNOSIS — R15.2 FECAL URGENCY: ICD-10-CM

## 2022-01-27 DIAGNOSIS — R19.4 CHANGE IN BOWEL HABIT: ICD-10-CM

## 2022-01-27 PROCEDURE — 99204 OFFICE O/P NEW MOD 45 MIN: CPT | Performed by: INTERNAL MEDICINE

## 2022-01-27 RX ORDER — HYOSCYAMINE SULFATE 0.12 MG/1
TABLET, ORALLY DISINTEGRATING ORAL
Qty: 30 | Refills: 5 | Status: ACTIVE
Start: 2022-01-27

## 2022-01-31 DIAGNOSIS — E78.00 HYPERCHOLESTEROLEMIA: ICD-10-CM

## 2022-01-31 RX ORDER — PRAVASTATIN SODIUM 20 MG
20 TABLET ORAL NIGHTLY
Qty: 90 TABLET | Refills: 1
Start: 2022-01-31 | End: 2022-02-02 | Stop reason: SDUPTHER

## 2022-02-02 DIAGNOSIS — E78.00 HYPERCHOLESTEROLEMIA: ICD-10-CM

## 2022-02-02 RX ORDER — PRAVASTATIN SODIUM 20 MG
20 TABLET ORAL NIGHTLY
Qty: 90 TABLET | Refills: 1 | Status: SHIPPED | OUTPATIENT
Start: 2022-02-02 | End: 2022-07-18

## 2022-02-02 NOTE — TELEPHONE ENCOUNTER
Caller: Gricelda Matos    Relationship: Self    Best call back number: [phone]    Requested Prescriptions:   Requested Prescriptions     Pending Prescriptions Disp Refills   • pravastatin (PRAVACHOL) 20 MG tablet 90 tablet 1     Sig: Take 1 tablet by mouth Every Night.        Pharmacy where request should be sent: [facility], [place], [place] - [facility] [address] - [phone]  - [phone]     Additional details provided by patient:   PT STATED  THAT RX WAS NEVER RECEIVED ON 1/31  PT WILL LIKE A CALL ONCE RX HAS BEEN SENT TO PHARMACY.  Does the patient have less than a 3 day supply:  [x] Yes  [] No    Citlalli Ragland Rep   02/02/22 15:38 EST

## 2022-02-02 NOTE — TELEPHONE ENCOUNTER
"Notified patient I have corrected rx and resent. The one on the 31rst was marked as \"no print\".   "

## 2022-02-03 ENCOUNTER — AMBULATORY SURGICAL CENTER (OUTPATIENT)
Dept: URBAN - METROPOLITAN AREA SURGERY 10 | Facility: SURGERY | Age: 67
End: 2022-02-03

## 2022-02-03 ENCOUNTER — OFFICE (OUTPATIENT)
Dept: URBAN - METROPOLITAN AREA PATHOLOGY 4 | Facility: PATHOLOGY | Age: 67
End: 2022-02-03
Payer: MEDICARE

## 2022-02-03 DIAGNOSIS — R15.2 FECAL URGENCY: ICD-10-CM

## 2022-02-03 DIAGNOSIS — D12.3 BENIGN NEOPLASM OF TRANSVERSE COLON: ICD-10-CM

## 2022-02-03 DIAGNOSIS — K64.0 FIRST DEGREE HEMORRHOIDS: ICD-10-CM

## 2022-02-03 DIAGNOSIS — D12.2 BENIGN NEOPLASM OF ASCENDING COLON: ICD-10-CM

## 2022-02-03 DIAGNOSIS — K57.30 DIVERTICULOSIS OF LARGE INTESTINE WITHOUT PERFORATION OR ABS: ICD-10-CM

## 2022-02-03 PROCEDURE — 45380 COLONOSCOPY AND BIOPSY: CPT | Mod: 59 | Performed by: INTERNAL MEDICINE

## 2022-02-03 PROCEDURE — 45385 COLONOSCOPY W/LESION REMOVAL: CPT | Performed by: INTERNAL MEDICINE

## 2022-02-03 PROCEDURE — 88305 TISSUE EXAM BY PATHOLOGIST: CPT | Performed by: INTERNAL MEDICINE

## 2022-04-13 NOTE — TELEPHONE ENCOUNTER
Rx Refill Note  Requested Prescriptions     Pending Prescriptions Disp Refills   • mupirocin (BACTROBAN) 2 % ointment [Pharmacy Med Name: MUPIROCIN 2% OINTMENT 2 Ointment] 22 g 0     Sig: USING Q-TIPS APPLY A SMALL AMOUNT INTO EACH NOSTRIL, TWICE DAILY FOR 5 DAYS PRIOR TO SURGERY      Last office visit with prescribing clinician: 10/18/2021      Next office visit with prescribing clinician: Visit date not found                Bri Gold LPN  04/13/22, 15:01 EDT

## 2022-04-19 ENCOUNTER — OFFICE (OUTPATIENT)
Dept: URBAN - METROPOLITAN AREA CLINIC 4 | Facility: CLINIC | Age: 67
End: 2022-04-19

## 2022-04-19 VITALS — HEIGHT: 59 IN | SYSTOLIC BLOOD PRESSURE: 119 MMHG | WEIGHT: 125 LBS | DIASTOLIC BLOOD PRESSURE: 73 MMHG

## 2022-04-19 DIAGNOSIS — R15.2 FECAL URGENCY: ICD-10-CM

## 2022-04-19 DIAGNOSIS — K59.00 CONSTIPATION, UNSPECIFIED: ICD-10-CM

## 2022-04-19 DIAGNOSIS — R19.7 DIARRHEA, UNSPECIFIED: ICD-10-CM

## 2022-04-19 PROCEDURE — 99214 OFFICE O/P EST MOD 30 MIN: CPT | Performed by: NURSE PRACTITIONER

## 2022-06-07 ENCOUNTER — OFFICE (OUTPATIENT)
Dept: URBAN - METROPOLITAN AREA CLINIC 4 | Facility: CLINIC | Age: 67
End: 2022-06-07

## 2022-06-07 VITALS — WEIGHT: 124 LBS | HEIGHT: 59 IN | DIASTOLIC BLOOD PRESSURE: 61 MMHG | SYSTOLIC BLOOD PRESSURE: 119 MMHG

## 2022-06-07 DIAGNOSIS — R19.4 CHANGE IN BOWEL HABIT: ICD-10-CM

## 2022-06-07 DIAGNOSIS — R19.7 DIARRHEA, UNSPECIFIED: ICD-10-CM

## 2022-06-07 DIAGNOSIS — R15.2 FECAL URGENCY: ICD-10-CM

## 2022-06-07 PROCEDURE — 99214 OFFICE O/P EST MOD 30 MIN: CPT | Performed by: NURSE PRACTITIONER

## 2022-06-22 RX ORDER — LEVOMEFOLATE CALCIUM 15 MG
15 TABLET ORAL DAILY
Qty: 90 TABLET | Refills: 3 | Status: SHIPPED | OUTPATIENT
Start: 2022-06-22

## 2022-07-13 ENCOUNTER — OFFICE VISIT (OUTPATIENT)
Dept: INTERNAL MEDICINE | Facility: CLINIC | Age: 67
End: 2022-07-13

## 2022-07-13 VITALS
WEIGHT: 124 LBS | TEMPERATURE: 97.1 F | HEIGHT: 59 IN | DIASTOLIC BLOOD PRESSURE: 80 MMHG | BODY MASS INDEX: 25 KG/M2 | HEART RATE: 72 BPM | SYSTOLIC BLOOD PRESSURE: 126 MMHG

## 2022-07-13 DIAGNOSIS — R20.0 NUMBNESS OF LEFT FOOT: Primary | ICD-10-CM

## 2022-07-13 DIAGNOSIS — R79.9 ABNORMAL FINDING OF BLOOD CHEMISTRY, UNSPECIFIED: ICD-10-CM

## 2022-07-13 LAB
EXPIRATION DATE: NORMAL
HBA1C MFR BLD: 5.3 % (ref 4.8–5.6)
Lab: NORMAL

## 2022-07-13 PROCEDURE — 83036 HEMOGLOBIN GLYCOSYLATED A1C: CPT | Performed by: NURSE PRACTITIONER

## 2022-07-13 PROCEDURE — 3044F HG A1C LEVEL LT 7.0%: CPT | Performed by: NURSE PRACTITIONER

## 2022-07-13 PROCEDURE — 99213 OFFICE O/P EST LOW 20 MIN: CPT | Performed by: NURSE PRACTITIONER

## 2022-07-13 RX ORDER — PEPPERMINT OIL 90 MG
2 CAPSULE, DELAYED, AND EXTENDED RELEASE ORAL
COMMUNITY
End: 2023-01-09

## 2022-07-13 NOTE — PROGRESS NOTES
Gricelda Matos  1955  [id number]  Patient Care Team:  Agata Miner MD as PCP - General  Agata Miner MD as PCP - Family Medicine    Gricelda Matos is a pleasant 66 y.o. female who presents for evaluation of Numbness (Numbness and tingling in the left ankle. )    Chief Complaint   Patient presents with   • Numbness     Numbness and tingling in the left ankle.        HPI:   Gricelda is a 66-year-old female who comes to the office today for a numbness in her left ankle and foot.    Gricelda states that for the past 10 days she has noticed a numbness and tingling to her left inner ankle and going up her calf when she goes from a sitting to standing position.  It only lasts moments to minutes. She denies trauma.  Denies pain.  Denies swelling.  Denies redness.  Denies tenderness upon palpation.    In December and January Gricelda saw podiatry who stated that she needed to wear shoe inserts and sneakers.  She has been consistently wearing the inserts and sneakers and avoiding flat shoes without support as much as possible.  She had a cortisone injection to the left foot and has had no problems since then until now.  Past Medical History:   Diagnosis Date   • Abdominal pain 2013    Dr. Garvin felt from too much raw vegetables   • Arthritis    • Atypical squamous cells of undetermined significance (ASCUS) on Papanicolaou smear of cervix 2004   • Brain hemangioma (HCC) 2014    benign-(incidental finding on MRI)   • Burning tongue syndrome 2015   • Cough    • Elevated homocysteine     MTHFR pos for 2 copies of T3405H mutation,neg for C677T mutation   • Fungal toenail infection 4/29/2019 2/4/2020 Agata Miner MD  Continue fluconazole weekly per Dr. Romero.  Follow-up with him as planned.   • Glossitis 4/29/2019   • Heart murmur     No murmur noted for years.   • Hormone deficiency 2013    Dr. Rod did hormone testing-she took HRT for about 6 months   • Mitral valve prolapse     Diagnosed when she  was younger.  Asymptomatic.  Stress echo done by Dr.Avichai Liang in  was normal.   • Nocturnal cough 2019   • OA (osteoarthritis) 2016    severe OA of R hip (L hip mild)   • Open wound of finger with complication 2019   • Ovarian cyst    • Skin cancer     basal cell skin cancers on back   • Spine disorder     Lumbar spine degenerative changes/mild stenosis/moderate facet disease   • Zinc deficiency 2019     Past Surgical History:   Procedure Laterality Date   •  SECTION      x3; , , &    • COLONOSCOPY     • COLPOSCOPY  2004    by Dr. Bustos   • ENDOSCOPY      colonoscopy okay    • HIP ARTHROPLASTY Right 2016   • TONSILLECTOMY       Family History   Problem Relation Age of Onset   • Hypertension Mother    • Hypertension Father    • Cancer Father         Bladder   • Colon polyps Father    • Breast cancer Paternal Grandmother    • Colon cancer Maternal Grandmother    • Hypertension Maternal Grandmother    • Coronary artery disease Maternal Grandfather 65         of MI at 65   • Other Daughter 28        MTH-FR gene mutation     Social History     Tobacco Use   Smoking Status Former Smoker   • Packs/day: 0.50   • Years: 4.00   • Pack years: 2.00   • Types: Cigarettes   • Quit date:    • Years since quittin.5   Smokeless Tobacco Never Used   Tobacco Comment    Quit      Allergies   Allergen Reactions   • Contrast Dye Hives   • Cyclobenzaprine Unknown (See Comments)     Unknown   • Naproxen Sodium Other (See Comments)     Excessive bruising   Aleive        Current Outpatient Medications:   •  Calcium Carbonate-Vitamin D (CALTRATE 600+D PO), Take 1,000 mg by mouth Daily., Disp: , Rfl:   •  estradiol (VAGIFEM) 10 MCG tablet vaginal tablet, Insert 1 tablet into the vagina 2 (Two) Times a Week., Disp: 60 tablet, Rfl: 3  •  l-methylfolate 15 MG tablet tablet, Take 1 tablet by mouth Daily., Disp: 90 tablet, Rfl: 3  •  melatonin 5 MG tablet tablet, Take 5  "mg by mouth At Night As Needed., Disp: , Rfl:   •  mupirocin (BACTROBAN) 2 % ointment, USING Q-TIPS APPLY A SMALL AMOUNT INTO EACH NOSTRIL, TWICE DAILY FOR 5 DAYS PRIOR TO SURGERY (Patient taking differently: Apply 1 application topically to the appropriate area as directed Daily As Needed.), Disp: 22 g, Rfl: 0  •  Peppermint Oil (IBgard) 90 MG capsule controlled-release, Take 2 capsules by mouth Every Morning., Disp: , Rfl:   •  phenazopyridine (PYRIDIUM) 200 MG tablet, Take 1 tablet by mouth 3 (Three) Times a Day As Needed for Bladder Spasms., Disp: 9 tablet, Rfl: 0  •  polycarbophil 625 MG tablet tablet, Take 625 mg by mouth Daily., Disp: , Rfl:   •  polyethylene glycol (MIRALAX) 17 GM/SCOOP powder, Take 17 g by mouth Daily., Disp: , Rfl:   •  pravastatin (PRAVACHOL) 20 MG tablet, Take 1 tablet by mouth Every Night., Disp: 90 tablet, Rfl: 1  •  zolpidem (AMBIEN) 5 MG tablet, Take 1 tablet by mouth At Night As Needed for Sleep., Disp: 10 tablet, Rfl: 0    Review of Systems   Musculoskeletal: Negative for gait problem.   Skin: Negative for color change, dry skin and rash.   Neurological: Positive for numbness.     Review of systems was completed, and pertinent findings are noted in the HPI.  /80 (BP Location: Left arm, Patient Position: Sitting, Cuff Size: Adult)   Pulse 72   Temp 97.1 °F (36.2 °C) (Temporal)   Ht 149.9 cm (59\")   Wt 56.2 kg (124 lb)   BMI 25.04 kg/m²     Physical Exam  Vitals reviewed.   Constitutional:       Appearance: She is well-developed.   Cardiovascular:      Pulses:           Dorsalis pedis pulses are 1+ on the left side.        Posterior tibial pulses are 1+ on the left side.   Pulmonary:      Effort: Pulmonary effort is normal.   Musculoskeletal:      Right ankle: Normal.      Left ankle: Normal.      Left foot: Normal range of motion. No deformity.   Feet:      Right foot:      Skin integrity: Skin integrity normal.      Toenail Condition: Right toenails are normal.      Left " foot:      Skin integrity: Skin integrity normal.      Toenail Condition: Left toenails are normal.   Skin:     General: Skin is warm and dry.   Neurological:      Mental Status: She is alert.   Psychiatric:         Behavior: Behavior normal.         Procedures    PHQ-9 Total Score:      Assessment/Plan:  Diagnoses and all orders for this visit:    1. Numbness of left foot (Primary)  Comments:  Podiatry consult.    2. Abnormal finding of blood chemistry, unspecified   Comments:  A1c in the office today.  Orders:  -     POC Glycosylated Hemoglobin (Hb A1C)       There are no Patient Instructions on file for this visit.  Plan of care reviewed with patient at the conclusion of today's visit. Education was provided regarding diagnosis, management and any prescribed or recommended OTC medications.  Patient verbalizes understanding of and agreement with management plan.    Return if symptoms worsen or fail to improve.    Dictated Utilizing Dragon Dictation.    TUNDE Hodgson

## 2022-07-18 DIAGNOSIS — N95.2 ATROPHIC VAGINITIS: ICD-10-CM

## 2022-07-18 DIAGNOSIS — E78.00 HYPERCHOLESTEROLEMIA: ICD-10-CM

## 2022-07-18 RX ORDER — ESTRADIOL 10 UG/1
INSERT VAGINAL
Qty: 8 TABLET | Refills: 0 | Status: SHIPPED | OUTPATIENT
Start: 2022-07-18 | End: 2022-08-16 | Stop reason: SDUPTHER

## 2022-07-18 RX ORDER — PRAVASTATIN SODIUM 20 MG
20 TABLET ORAL NIGHTLY
Qty: 30 TABLET | Refills: 1 | Status: SHIPPED | OUTPATIENT
Start: 2022-07-18 | End: 2022-09-06

## 2022-07-18 NOTE — TELEPHONE ENCOUNTER
Rx Refill Note  Requested Prescriptions     Pending Prescriptions Disp Refills   • estradiol (VAGIFEM) 10 MCG tablet vaginal tablet [Pharmacy Med Name: ESTRADIOL 10MCG VAGINAL INS 10 Tablet] 8 tablet 0     Sig: INSERT ONE TABLET INTO THE VAGINA TWO TIMES A WEEK   • pravastatin (PRAVACHOL) 20 MG tablet [Pharmacy Med Name: PRAVASTATIN NA 20 MG TAB 20 Tablet] 30 tablet 1     Sig: TAKE 1 TABLET BY MOUTH EVERY NIGHT      Last office visit with prescribing clinician: 10/18/2021      Next office visit with prescribing clinician: Visit date not found            Hodan Luna RN  07/18/22, 12:30 EDT

## 2022-08-16 DIAGNOSIS — N95.2 ATROPHIC VAGINITIS: ICD-10-CM

## 2022-08-16 RX ORDER — ESTRADIOL 10 UG/1
1 INSERT VAGINAL 2 TIMES WEEKLY
Qty: 24 TABLET | Refills: 0 | Status: SHIPPED | OUTPATIENT
Start: 2022-08-18 | End: 2022-12-07 | Stop reason: SDUPTHER

## 2022-09-06 DIAGNOSIS — E78.00 HYPERCHOLESTEROLEMIA: ICD-10-CM

## 2022-09-06 RX ORDER — PRAVASTATIN SODIUM 20 MG
TABLET ORAL
Qty: 30 TABLET | Refills: 1 | Status: SHIPPED | OUTPATIENT
Start: 2022-09-06 | End: 2022-09-06

## 2022-09-06 RX ORDER — PRAVASTATIN SODIUM 20 MG
20 TABLET ORAL NIGHTLY
Qty: 30 TABLET | Refills: 1 | Status: SHIPPED | OUTPATIENT
Start: 2022-09-06 | End: 2022-11-18 | Stop reason: SDUPTHER

## 2022-10-18 ENCOUNTER — OFFICE (OUTPATIENT)
Dept: URBAN - METROPOLITAN AREA CLINIC 4 | Facility: CLINIC | Age: 67
End: 2022-10-18

## 2022-10-18 VITALS — SYSTOLIC BLOOD PRESSURE: 118 MMHG | WEIGHT: 127 LBS | HEIGHT: 59 IN | DIASTOLIC BLOOD PRESSURE: 78 MMHG

## 2022-10-18 DIAGNOSIS — R19.4 CHANGE IN BOWEL HABIT: ICD-10-CM

## 2022-10-18 DIAGNOSIS — K59.00 CONSTIPATION, UNSPECIFIED: ICD-10-CM

## 2022-10-18 DIAGNOSIS — R15.2 FECAL URGENCY: ICD-10-CM

## 2022-10-18 DIAGNOSIS — R19.7 DIARRHEA, UNSPECIFIED: ICD-10-CM

## 2022-10-18 PROCEDURE — 99214 OFFICE O/P EST MOD 30 MIN: CPT | Performed by: NURSE PRACTITIONER

## 2022-11-18 DIAGNOSIS — E78.00 HYPERCHOLESTEROLEMIA: ICD-10-CM

## 2022-11-18 RX ORDER — PRAVASTATIN SODIUM 20 MG
20 TABLET ORAL NIGHTLY
Qty: 90 TABLET | Refills: 0 | Status: SHIPPED | OUTPATIENT
Start: 2022-11-18 | End: 2023-02-13 | Stop reason: SDUPTHER

## 2022-11-21 RX ORDER — PHENAZOPYRIDINE HYDROCHLORIDE 200 MG/1
200 TABLET, FILM COATED ORAL 3 TIMES DAILY PRN
Qty: 9 TABLET | Refills: 0 | Status: SHIPPED | OUTPATIENT
Start: 2022-11-21 | End: 2022-12-07 | Stop reason: SDUPTHER

## 2022-11-21 RX ORDER — NITROFURANTOIN 25; 75 MG/1; MG/1
100 CAPSULE ORAL 2 TIMES DAILY
Qty: 14 CAPSULE | Refills: 0 | Status: SHIPPED | OUTPATIENT
Start: 2022-11-21 | End: 2022-11-28

## 2022-12-02 ENCOUNTER — TELEPHONE (OUTPATIENT)
Dept: INTERNAL MEDICINE | Facility: CLINIC | Age: 67
End: 2022-12-02

## 2022-12-02 DIAGNOSIS — R05.1 ACUTE COUGH: Primary | ICD-10-CM

## 2022-12-02 RX ORDER — AZITHROMYCIN 250 MG/1
TABLET, FILM COATED ORAL
Qty: 6 TABLET | Refills: 0 | Status: SHIPPED | OUTPATIENT
Start: 2022-12-02 | End: 2022-12-07

## 2022-12-02 NOTE — TELEPHONE ENCOUNTER
Patient called and stated she has had a sore throat for the last week.  She has a deep cough that is not really productive, left ear pain.    She has been using Mucinex DM as well as Delsym cough syrup but it really has not helped much at all.  She denies any fever and she took a home covid test yesterday and it was negative.    No open slots for today.  Please advise.

## 2022-12-05 ENCOUNTER — CLINICAL SUPPORT (OUTPATIENT)
Dept: INTERNAL MEDICINE | Facility: CLINIC | Age: 67
End: 2022-12-05

## 2022-12-05 DIAGNOSIS — R82.90 ABNORMAL URINALYSIS: Primary | ICD-10-CM

## 2022-12-05 DIAGNOSIS — R30.0 DYSURIA: Primary | ICD-10-CM

## 2022-12-05 DIAGNOSIS — M54.50 ACUTE RIGHT-SIDED LOW BACK PAIN WITHOUT SCIATICA: Primary | ICD-10-CM

## 2022-12-05 DIAGNOSIS — M25.551 ACUTE PAIN OF RIGHT HIP: ICD-10-CM

## 2022-12-05 DIAGNOSIS — R82.90 ABNORMAL URINALYSIS: ICD-10-CM

## 2022-12-05 LAB
BILIRUB BLD-MCNC: NEGATIVE MG/DL
CLARITY, POC: ABNORMAL
COLOR UR: YELLOW
EXPIRATION DATE: ABNORMAL
GLUCOSE UR STRIP-MCNC: NEGATIVE MG/DL
KETONES UR QL: NEGATIVE
LEUKOCYTE EST, POC: ABNORMAL
Lab: ABNORMAL
NITRITE UR-MCNC: NEGATIVE MG/ML
PH UR: 7 [PH] (ref 5–8)
PROT UR STRIP-MCNC: NEGATIVE MG/DL
RBC # UR STRIP: ABNORMAL /UL
SP GR UR: 1.01 (ref 1–1.03)
UROBILINOGEN UR QL: ABNORMAL

## 2022-12-05 PROCEDURE — 87088 URINE BACTERIA CULTURE: CPT | Performed by: INTERNAL MEDICINE

## 2022-12-05 PROCEDURE — 81003 URINALYSIS AUTO W/O SCOPE: CPT | Performed by: INTERNAL MEDICINE

## 2022-12-05 PROCEDURE — 87086 URINE CULTURE/COLONY COUNT: CPT | Performed by: INTERNAL MEDICINE

## 2022-12-05 PROCEDURE — 87186 SC STD MICRODIL/AGAR DIL: CPT | Performed by: INTERNAL MEDICINE

## 2022-12-05 RX ORDER — NITROFURANTOIN 25; 75 MG/1; MG/1
100 CAPSULE ORAL 2 TIMES DAILY
Qty: 10 CAPSULE | Refills: 0 | Status: SHIPPED | OUTPATIENT
Start: 2022-12-05 | End: 2022-12-10

## 2022-12-07 ENCOUNTER — OFFICE VISIT (OUTPATIENT)
Dept: INTERNAL MEDICINE | Facility: CLINIC | Age: 67
End: 2022-12-07

## 2022-12-07 VITALS
OXYGEN SATURATION: 98 % | WEIGHT: 129.4 LBS | DIASTOLIC BLOOD PRESSURE: 78 MMHG | SYSTOLIC BLOOD PRESSURE: 122 MMHG | BODY MASS INDEX: 26.08 KG/M2 | TEMPERATURE: 97.8 F | HEART RATE: 72 BPM | HEIGHT: 59 IN

## 2022-12-07 DIAGNOSIS — B96.20 E. COLI UTI: ICD-10-CM

## 2022-12-07 DIAGNOSIS — E78.00 HYPERCHOLESTEROLEMIA: ICD-10-CM

## 2022-12-07 DIAGNOSIS — R05.1 ACUTE COUGH: Primary | Chronic | ICD-10-CM

## 2022-12-07 DIAGNOSIS — K21.9 GASTROESOPHAGEAL REFLUX DISEASE WITHOUT ESOPHAGITIS: ICD-10-CM

## 2022-12-07 DIAGNOSIS — N39.0 E. COLI UTI: ICD-10-CM

## 2022-12-07 DIAGNOSIS — E55.9 VITAMIN D DEFICIENCY: ICD-10-CM

## 2022-12-07 DIAGNOSIS — R79.83 HOMOCYSTEINEMIA: ICD-10-CM

## 2022-12-07 DIAGNOSIS — M54.50 ACUTE RIGHT-SIDED LOW BACK PAIN WITHOUT SCIATICA: ICD-10-CM

## 2022-12-07 DIAGNOSIS — R39.15 URINARY URGENCY: Chronic | ICD-10-CM

## 2022-12-07 DIAGNOSIS — N95.2 ATROPHIC VAGINITIS: ICD-10-CM

## 2022-12-07 PROCEDURE — 99214 OFFICE O/P EST MOD 30 MIN: CPT | Performed by: INTERNAL MEDICINE

## 2022-12-07 RX ORDER — OMEPRAZOLE 20 MG/1
20 CAPSULE, DELAYED RELEASE ORAL DAILY
Qty: 90 CAPSULE | Refills: 3 | Status: SHIPPED | OUTPATIENT
Start: 2022-12-07

## 2022-12-07 RX ORDER — PHENAZOPYRIDINE HYDROCHLORIDE 200 MG/1
200 TABLET, FILM COATED ORAL 3 TIMES DAILY PRN
Qty: 9 TABLET | Refills: 0 | Status: SHIPPED | OUTPATIENT
Start: 2022-12-07

## 2022-12-07 RX ORDER — ESTRADIOL 10 UG/1
1 INSERT VAGINAL 3 TIMES DAILY
Qty: 24 TABLET | Refills: 3 | Status: SHIPPED | OUTPATIENT
Start: 2022-12-07 | End: 2022-12-08 | Stop reason: SDUPTHER

## 2022-12-07 NOTE — PROGRESS NOTES
[facility]     Gricelda Matos  1955   [id number]      Patient Care Team:  Agata Miner MD as PCP - General  Agata Miner MD as PCP - Family Medicine    Chief Complaint   Patient presents with   • Cough   • Right sided sciatic pain            HPI  Patient is a 67 y.o. female presents today for an office visit.    Urinary tract infection  The patient states she had a UTI at [facility], and her urinary tract infection symptoms have improved, but they are not completely resolved. She states that she still took Pyridium because she was still having spasms. The sensitivity report is not back. She states that when she has spasms, it is like pain in the pelvic area. She still has a little burning with urination, but it is intermittent. She states that the last time she had a urinary tract infection, she had quicker results, but this seems to be a slower process. She was given Macrobid at that time and this time as well. Prior to [facility] her last urinary tract infection was in 01/2020. She states that 3 years ago, she had atrophy of the uterus, and once she got that under control she has not had a urinary tract infection since. She states that for 3 years, it has worked. She has been traveling a lot, and she realized that she is not taking the Vagifem as often. She states that she still took it once a week, but that was not enough. She states that she is now back to the Vagifem 3 times per day. She has been drinking more fluids this week. She will try the Replenz on the days she does not use Vagifem. She has been pushing a lot of fluids.     Cough  She has a very productive cough and a lot of nasal congestion. She states that the nasal congestion has stopped, but she still has the cough. She states that it is mostly during the night or when she wakes up. She states that last night was the first night that she did not use the cough syrup. She states that she was coughing so much that  she could not catch her breath. She states that she is very cognizant that it is codeine, so she does not want to use it. She states that she was awake for a while, but it was partly the hip too. She states that she is going to [facility] for [place] next Wednesday for 5 days. She states that part of her issue is the buttock pain and low back pain. She states that she has never had a cold like she had last week. She states that she did COVID-19 test 3 times which were all negative. She did not take the Z-Beltran because at that time she had an earache. She took the Mucinex DM twice per day, and it cleared up. The coughing is better and is not constant. She also had a little sore throat at the beginning.    Back pain  She states that on Monday, 12/05/2022, in the middle of the night, she could not walk to the bathroom. The patient got a cane and used it a lot until Tuesday, 12/06/2022. She states that this morning she thought she could make it, but she could not walk and had to hold onto the walls. When she came here on Monday, it was the right low back. Yesterday during the day, she stood up all day working on her laptop and the pain went away. She was told to get an x-ray. When she came here on 12/05/2022, she had to have a cane. It took her 20 minutes to get in. She states the pain was just in the leg. The patient did some stretching today. She does Pilates and yoga twice a week and walks at least an hour every day. Last week she walked 1500 or 2000 steps a day. She was doing too much sitting over the weekend. The patient is sure of that because she was determined to feel better and was sitting and napping. She had a massage on Monday, 12/05/2022. She is able to bend without pain. She has been taking Tylenol and an occasional ibuprofen. She asked about the heated ultrasound she can get at her gym, but the man is not trained on it, and asked if she should be worried.      Hypercholesterolemia   Her cholesterol medicine  was increased from 10 to 20 mg at her last check in 10/2021. She reports she has not had a blood test since then.    Heartburn  She states that she is having heartburn. She has been taking omeprazole daily for the last month. She was told to take it for a month and get off. She has not gotten off of it yet because she will be traveling a lot and cannot deal with the heartburn. She reports her symptoms have improved.          CHRONIC CONDITIONS      Past Medical History:   Diagnosis Date   • Abdominal pain     Dr. 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 Romero.  Follow-up with him as planned.   • Glossitis 2019   • Heart murmur     No murmur noted for years.   • Hormone deficiency     Dr. Rod did hormone testing-she took HRT for about 6 months   • Mitral valve prolapse     Diagnosed when she was younger.  Asymptomatic.  Stress echo done by Dr.Avichai Liang in  was normal.   • Nocturnal cough 2019   • OA (osteoarthritis) 2016    severe OA of R hip (L hip mild)   • Open wound of finger with complication 2019   • Ovarian cyst    • Skin cancer     basal cell skin cancers on back   • Spine disorder     Lumbar spine degenerative changes/mild stenosis/moderate facet disease   • Zinc deficiency 2019       Past Surgical History:   Procedure Laterality Date   •  SECTION      x3; 1982, , & 1987   • COLONOSCOPY     • COLPOSCOPY  2004    by Dr. Bustos   • ENDOSCOPY      colonoscopy okay    • HIP ARTHROPLASTY Right 2016   • TONSILLECTOMY   "       Family History   Problem Relation Age of Onset   • Hypertension Mother    • Hypertension Father    • Cancer Father         Bladder   • Colon polyps Father    • Breast cancer Paternal Grandmother    • Colon cancer Maternal Grandmother    • Hypertension Maternal Grandmother    • Coronary artery disease Maternal Grandfather 65         of MI at 65   • Other Daughter 28        MTH-FR gene mutation       Social History     Socioeconomic History   • Marital status:    Tobacco Use   • Smoking status: Former     Packs/day: 0.50     Years: 4.00     Pack years: 2.00     Types: Cigarettes     Quit date:      Years since quittin.9   • Smokeless tobacco: Never   • Tobacco comments:     Quit    Substance and Sexual Activity   • Alcohol use: Yes     Alcohol/week: 1.0 standard drink     Types: 1 Glasses of wine per week     Comment: daILY   • Drug use: No   • Sexual activity: Defer       Allergies   Allergen Reactions   • Contrast Dye Hives   • Cyclobenzaprine Unknown (See Comments)     Unknown   • Naproxen Sodium Other (See Comments)     Excessive bruising   Aleive        Vital Signs  Vitals:    22 1346   BP: 122/78   BP Location: Right arm   Patient Position: Sitting   Cuff Size: Adult   Pulse: 72   Temp: 97.8 °F (36.6 °C)   TempSrc: Infrared   SpO2: 98%   Weight: 58.7 kg (129 lb 6.4 oz)   Height: 149.9 cm (59\")   PainSc:   2   PainLoc: Back     Body mass index is 26.14 kg/m².  BMI is >= 25 and <30. (Overweight) The following options were offered after discussion;: exercise counseling/recommendations and nutrition counseling/recommendations        Current Outpatient Medications:   •  Calcium Carbonate-Vitamin D (CALTRATE 600+D PO), Take 1,000 mg by mouth Daily., Disp: , Rfl:   •  HYDROcod Polst-CPM Polst ER (Tussionex Pennkinetic ER) 10-8 MG/5ML ER suspension, Take 5 mL by mouth Every 12 (Twelve) Hours As Needed for Cough., Disp: 60 mL, Rfl: 0  •  l-methylfolate 15 MG tablet tablet, Take 1 " tablet by mouth Daily., Disp: 90 tablet, Rfl: 3  •  melatonin 5 MG tablet tablet, Take 5 mg by mouth At Night As Needed., Disp: , Rfl:   •  mupirocin (BACTROBAN) 2 % ointment, Apply 1 application topically to the appropriate area as directed 2 (Two) Times a Day As Needed (irritation)., Disp: 22 g, Rfl: 0  •  phenazopyridine (Pyridium) 200 MG tablet, Take 1 tablet by mouth 3 (Three) Times a Day As Needed for Bladder Spasms., Disp: 9 tablet, Rfl: 0  •  polyethylene glycol (MIRALAX) 17 GM/SCOOP powder, Take 17 g by mouth Daily., Disp: , Rfl:   •  pravastatin (PRAVACHOL) 20 MG tablet, Take 1 tablet by mouth Every Night., Disp: 90 tablet, Rfl: 0  •  zolpidem (AMBIEN) 5 MG tablet, Take 1 tablet by mouth At Night As Needed for Sleep., Disp: 10 tablet, Rfl: 0  •  estradiol (VAGIFEM) 10 MCG tablet vaginal tablet, Insert 1 tablet into the vagina 3 (Three) Times a Week., Disp: 24 tablet, Rfl: 3  •  omeprazole (priLOSEC) 20 MG capsule, Take 1 capsule by mouth Daily., Disp: 90 capsule, Rfl: 3  •  Peppermint Oil (IBgard) 90 MG capsule controlled-release, Take 2 capsules by mouth Every Morning., Disp: , Rfl:   •  polycarbophil 625 MG tablet tablet, Take 625 mg by mouth Daily., Disp: , Rfl:     Physical Exam:    Physical Exam  Vitals and nursing note reviewed.   Constitutional:       Appearance: She is well-developed.   HENT:      Head: Normocephalic.      Nose:      Comments: Bilateral nares with mild congestion, pale mucosa.      Mouth/Throat:      Comments: Oropharynx is clear.    Eyes:      Conjunctiva/sclera: Conjunctivae normal.      Pupils: Pupils are equal, round, and reactive to light.   Neck:      Thyroid: No thyromegaly.   Cardiovascular:      Rate and Rhythm: Normal rate and regular rhythm.      Heart sounds: Normal heart sounds.   Pulmonary:      Effort: Pulmonary effort is normal.      Breath sounds: Normal breath sounds.   Musculoskeletal:         General: Normal range of motion.      Cervical back: Normal range of  motion and neck supple.      Comments: Right low back spasm and tenderness. Normal gait. Right low back pain, spasm, and tenderness.   Lymphadenopathy:      Cervical: No cervical adenopathy.   Neurological:      Mental Status: She is alert and oriented to person, place, and time.   Psychiatric:         Thought Content: Thought content normal.          ACE III MINI        Results Review:    None    CMP:  Lab Results   Component Value Date    BUN 19 01/18/2022    CREATININE 0.75 01/18/2022    EGFRIFNONA 78 01/18/2022    BCR 25.3 (H) 01/18/2022     01/18/2022    K 4.1 01/18/2022    CO2 26.0 01/18/2022    CALCIUM 9.3 01/18/2022    ALBUMIN 4.90 01/18/2022    BILITOT 0.6 01/18/2022    ALKPHOS 62 01/18/2022    AST 27 01/18/2022    ALT 23 01/18/2022     HbA1c:  Lab Results   Component Value Date    HGBA1C 5.3 07/13/2022    HGBA1C 4.80 09/14/2020     Microalbumin:  No results found for: MICROALBUR, POCMALB, POCCREAT  Lipid Panel  Lab Results   Component Value Date    CHOL 209 (H) 01/18/2022    TRIG 67 01/18/2022    HDL 75 (H) 01/18/2022     (H) 01/18/2022    AST 27 01/18/2022    ALT 23 01/18/2022     Labs Reviewed:   Urine culture has grown E. coli but the sensitivitis are not back yet.     Medication Review: Medications reviewed and noted  Patient Instructions   Problem List Items Addressed This Visit        Cardiac and Vasculature    Hypercholesterolemia    Overview     Taking pravastatin.         Current Assessment & Plan     She will continue pravastatin every evening. Continue to decrease fats and sugars in the diet. Continue to get some exercise every day.         Relevant Medications    pravastatin (PRAVACHOL) 20 MG tablet    Other Relevant Orders    CBC & Differential    Comprehensive Metabolic Panel    Homocysteine    Lipid Panel    Urinalysis With Microscopic - Urine, Clean Catch    TSH       Endocrine and Metabolic    Homocysteinemia    Overview     Taking l-methylfolate daily.         Current  Assessment & Plan     She will continue taking -methyl folate daily. We will recheck homocysteine level.         Vitamin D deficiency    Current Assessment & Plan     We will recheck vitamin D level.         Relevant Orders    Vitamin D,25-Hydroxy       Gastrointestinal Abdominal     GERD (gastroesophageal reflux disease)    Overview     Taking omeprazole daily.         Current Assessment & Plan     She will continue taking omeprazole daily. Avoid eating close to bedtime. Avoid eating large meals. Whenever taking ibuprofen, take it with food.         Relevant Medications    omeprazole (priLOSEC) 20 MG capsule       Genitourinary and Reproductive     Urinary urgency (Chronic)    Overview                Current Assessment & Plan     Continue the Vagifem 3 nights per week.         Atrophic vaginitis    Overview     Using vagifem at bedtime 3 nights a week.         Current Assessment & Plan     She will continue Vagifem 3 nights a week for at least a couple of months.         Relevant Medications    estradiol (VAGIFEM) 10 MCG tablet vaginal tablet    E. coli UTI    Current Assessment & Plan     She will continue the Macrobid. We will let her know when we have the antibiotic sensitivities available.            Musculoskeletal and Injuries    Acute right-sided low back pain without sciatica    Current Assessment & Plan     She is encouraged to continue doing the back stretches twice per day. Walk some every day. She may take some ibuprofen as needed to decrease inflammation. The patient should also take some Tylenol as needed for pain. Moist heat pack on the right low back can also be helpful by relaxing the tight muscles.            Other    Acute cough - Primary (Chronic)    Overview     Patient did 3 Covid tests-all negative.  Cough is most likely postviral.  Is improving.         Current Assessment & Plan     She will continue using Mucinex twice per day.         Relevant Medications    HYDROcod Polst-CPM Polst ER  (Tussionex Pennkinetic ER) 10-8 MG/5ML ER suspension          Diagnosis Plan   1. Acute cough        2. E. coli UTI        3. Acute right-sided low back pain without sciatica        4. Atrophic vaginitis        5. Gastroesophageal reflux disease without esophagitis        6. Homocysteinemia        7. Hypercholesterolemia  CBC & Differential    Comprehensive Metabolic Panel    Homocysteine    Lipid Panel    Urinalysis With Microscopic - Urine, Clean Catch    TSH      8. Vitamin D deficiency  Vitamin D,25-Hydroxy      9. Urinary urgency          Follow-up: She will follow-up later this month of early 01/2023 for labs.          Plan of care reviewed with patient at the conclusion of today's visit. Education was provided regarding diagnosis, management, and any prescribed or recommended OTC medications.Patient verbalizes understanding of and agreement with management plan.         Agata Miner MD      Transcribed from ambient dictation for Agata Miner MD by Rosemary Sosa.  12/07/22   16:09 EST    Patient or patient representative verbalized consent to the visit recording.  I have personally performed the services described in this document as transcribed by the above individual, and it is both accurate and complete.

## 2022-12-07 NOTE — ASSESSMENT & PLAN NOTE
She will continue the Macrobid. We will let her know when we have the antibiotic sensitivities available.

## 2022-12-07 NOTE — ASSESSMENT & PLAN NOTE
She will continue taking omeprazole daily. Avoid eating close to bedtime. Avoid eating large meals. Whenever taking ibuprofen, take it with food.

## 2022-12-07 NOTE — ASSESSMENT & PLAN NOTE
She is encouraged to continue doing the back stretches twice per day. Walk some every day. She may take some ibuprofen as needed to decrease inflammation. The patient should also take some Tylenol as needed for pain. Moist heat pack on the right low back can also be helpful by relaxing the tight muscles.

## 2022-12-07 NOTE — ASSESSMENT & PLAN NOTE
She will continue pravastatin every evening. Continue to decrease fats and sugars in the diet. Continue to get some exercise every day.

## 2022-12-08 ENCOUNTER — TELEPHONE (OUTPATIENT)
Dept: INTERNAL MEDICINE | Facility: CLINIC | Age: 67
End: 2022-12-08

## 2022-12-08 DIAGNOSIS — N95.2 ATROPHIC VAGINITIS: ICD-10-CM

## 2022-12-08 LAB — BACTERIA SPEC AEROBE CULT: ABNORMAL

## 2022-12-08 RX ORDER — ESTRADIOL 10 UG/1
1 INSERT VAGINAL 3 TIMES WEEKLY
Qty: 24 TABLET | Refills: 3 | Status: SHIPPED | OUTPATIENT
Start: 2022-12-08 | End: 2022-12-12

## 2022-12-08 NOTE — TELEPHONE ENCOUNTER
Pharmacy Name: [facility] - [place], [place] - [address] - [phone]  - [phone]      Pharmacy representative name: LISETTE    What medication are you calling in regards to: ESTRADIOL    What question does the pharmacy have: PHARMACY IS CALLING TO VERIFY THE INSTRUCTIONS. THEY SAW A CHANGE IN DIRECTIONS AND ARE WANTING TO CONFIRM THAT THE PATIENT IS GOING FROM 2 TIMES A WEEK TO NOW 3 TIMES A DAY     Who is the provider that prescribed the medication: DR MEADOWS

## 2022-12-10 DIAGNOSIS — N95.2 ATROPHIC VAGINITIS: ICD-10-CM

## 2022-12-11 NOTE — PATIENT INSTRUCTIONS
Patient Instructions   Problem List Items Addressed This Visit          Cardiac and Vasculature    Hypercholesterolemia    Overview     Taking pravastatin.         Current Assessment & Plan     She will continue pravastatin every evening. Continue to decrease fats and sugars in the diet. Continue to get some exercise every day.         Relevant Medications    pravastatin (PRAVACHOL) 20 MG tablet    Other Relevant Orders    CBC & Differential    Comprehensive Metabolic Panel    Homocysteine    Lipid Panel    Urinalysis With Microscopic - Urine, Clean Catch    TSH       Endocrine and Metabolic    Homocysteinemia    Overview     Taking l-methylfolate daily.         Current Assessment & Plan     She will continue taking -methyl folate daily. We will recheck homocysteine level.         Vitamin D deficiency    Current Assessment & Plan     We will recheck vitamin D level.         Relevant Orders    Vitamin D,25-Hydroxy       Gastrointestinal Abdominal     GERD (gastroesophageal reflux disease)    Overview     Taking omeprazole daily.         Current Assessment & Plan     She will continue taking omeprazole daily. Avoid eating close to bedtime. Avoid eating large meals. Whenever taking ibuprofen, take it with food.         Relevant Medications    omeprazole (priLOSEC) 20 MG capsule       Genitourinary and Reproductive     Urinary urgency (Chronic)    Overview                Current Assessment & Plan     Continue the Vagifem 3 nights per week.         Atrophic vaginitis    Overview     Using vagifem at bedtime 3 nights a week.         Current Assessment & Plan     She will continue Vagifem 3 nights a week for at least a couple of months.         Relevant Medications    estradiol (VAGIFEM) 10 MCG tablet vaginal tablet    E. coli UTI    Current Assessment & Plan     She will continue the Macrobid. We will let her know when we have the antibiotic sensitivities available.            Musculoskeletal and Injuries    Acute  right-sided low back pain without sciatica    Current Assessment & Plan     She is encouraged to continue doing the back stretches twice per day. Walk some every day. She may take some ibuprofen as needed to decrease inflammation. The patient should also take some Tylenol as needed for pain. Moist heat pack on the right low back can also be helpful by relaxing the tight muscles.            Other    Acute cough - Primary (Chronic)    Overview     Patient did 3 Covid tests-all negative.  Cough is most likely postviral.  Is improving.         Current Assessment & Plan     She will continue using Mucinex twice per day.         Relevant Medications    HYDROcod Polst-CPM Polst ER (Tussionex Pennkinetic ER) 10-8 MG/5ML ER suspension

## 2022-12-12 RX ORDER — ESTRADIOL 10 UG/1
INSERT VAGINAL
Qty: 8 TABLET | Refills: 0 | Status: SHIPPED | OUTPATIENT
Start: 2022-12-12

## 2022-12-12 NOTE — TELEPHONE ENCOUNTER
Rx Refill Note  Requested Prescriptions     Pending Prescriptions Disp Refills   • estradiol (VAGIFEM) 10 MCG tablet vaginal tablet [Pharmacy Med Name: ESTRADIOL 10MCG VAGINAL INS 10 Tablet] 8 tablet 0     Sig: INSERT 1 TABLET INTO THE VAGINA 2 TIMES A WEEK      Last office visit with prescribing clinician: 12/7/2022   Last telemedicine visit with prescribing clinician: 1/6/2023   Next office visit with prescribing clinician: 1/6/2023                         Would you like a call back once the refill request has been completed: [] Yes [] No    If the office needs to give you a call back, can they leave a voicemail: [] Yes [] No    Dalila Trevino LPN  12/12/22, 07:40 EST

## 2022-12-13 ENCOUNTER — LAB (OUTPATIENT)
Dept: LAB | Facility: HOSPITAL | Age: 67
End: 2022-12-13

## 2022-12-13 DIAGNOSIS — E55.9 VITAMIN D DEFICIENCY: ICD-10-CM

## 2022-12-13 DIAGNOSIS — E78.00 HYPERCHOLESTEROLEMIA: ICD-10-CM

## 2022-12-13 LAB
25(OH)D3 SERPL-MCNC: 52.1 NG/ML (ref 30–100)
ALBUMIN SERPL-MCNC: 4.5 G/DL (ref 3.5–5.2)
ALBUMIN/GLOB SERPL: 1.8 G/DL
ALP SERPL-CCNC: 67 U/L (ref 39–117)
ALT SERPL W P-5'-P-CCNC: 25 U/L (ref 1–33)
ANION GAP SERPL CALCULATED.3IONS-SCNC: 8.7 MMOL/L (ref 5–15)
AST SERPL-CCNC: 28 U/L (ref 1–32)
BACTERIA UR QL AUTO: NORMAL /HPF
BASOPHILS # BLD AUTO: 0.05 10*3/MM3 (ref 0–0.2)
BASOPHILS NFR BLD AUTO: 0.7 % (ref 0–1.5)
BILIRUB SERPL-MCNC: 0.3 MG/DL (ref 0–1.2)
BILIRUB UR QL STRIP: NEGATIVE
BUN SERPL-MCNC: 19 MG/DL (ref 8–23)
BUN/CREAT SERPL: 23.5 (ref 7–25)
CALCIUM SPEC-SCNC: 9.2 MG/DL (ref 8.6–10.5)
CHLORIDE SERPL-SCNC: 102 MMOL/L (ref 98–107)
CHOLEST SERPL-MCNC: 167 MG/DL (ref 0–200)
CLARITY UR: CLEAR
CO2 SERPL-SCNC: 26.3 MMOL/L (ref 22–29)
COLOR UR: YELLOW
CREAT SERPL-MCNC: 0.81 MG/DL (ref 0.57–1)
DEPRECATED RDW RBC AUTO: 39.6 FL (ref 37–54)
EGFRCR SERPLBLD CKD-EPI 2021: 80.2 ML/MIN/1.73
EOSINOPHIL # BLD AUTO: 0.22 10*3/MM3 (ref 0–0.4)
EOSINOPHIL NFR BLD AUTO: 3 % (ref 0.3–6.2)
ERYTHROCYTE [DISTWIDTH] IN BLOOD BY AUTOMATED COUNT: 12.5 % (ref 12.3–15.4)
GLOBULIN UR ELPH-MCNC: 2.5 GM/DL
GLUCOSE SERPL-MCNC: 94 MG/DL (ref 65–99)
GLUCOSE UR STRIP-MCNC: NEGATIVE MG/DL
HCT VFR BLD AUTO: 38.2 % (ref 34–46.6)
HCYS SERPL-MCNC: 16.2 UMOL/L (ref 0–15)
HDLC SERPL-MCNC: 58 MG/DL (ref 40–60)
HGB BLD-MCNC: 12.4 G/DL (ref 12–15.9)
HGB UR QL STRIP.AUTO: NEGATIVE
HYALINE CASTS UR QL AUTO: NORMAL /LPF
IMM GRANULOCYTES # BLD AUTO: 0.04 10*3/MM3 (ref 0–0.05)
IMM GRANULOCYTES NFR BLD AUTO: 0.5 % (ref 0–0.5)
KETONES UR QL STRIP: NEGATIVE
LDLC SERPL CALC-MCNC: 96 MG/DL (ref 0–100)
LDLC/HDLC SERPL: 1.64 {RATIO}
LEUKOCYTE ESTERASE UR QL STRIP.AUTO: NEGATIVE
LYMPHOCYTES # BLD AUTO: 2.1 10*3/MM3 (ref 0.7–3.1)
LYMPHOCYTES NFR BLD AUTO: 28.6 % (ref 19.6–45.3)
MCH RBC QN AUTO: 28.4 PG (ref 26.6–33)
MCHC RBC AUTO-ENTMCNC: 32.5 G/DL (ref 31.5–35.7)
MCV RBC AUTO: 87.4 FL (ref 79–97)
MONOCYTES # BLD AUTO: 0.5 10*3/MM3 (ref 0.1–0.9)
MONOCYTES NFR BLD AUTO: 6.8 % (ref 5–12)
NEUTROPHILS NFR BLD AUTO: 4.44 10*3/MM3 (ref 1.7–7)
NEUTROPHILS NFR BLD AUTO: 60.4 % (ref 42.7–76)
NITRITE UR QL STRIP: NEGATIVE
NRBC BLD AUTO-RTO: 0 /100 WBC (ref 0–0.2)
PH UR STRIP.AUTO: 7.5 [PH] (ref 5–8)
PLATELET # BLD AUTO: 295 10*3/MM3 (ref 140–450)
PMV BLD AUTO: 12 FL (ref 6–12)
POTASSIUM SERPL-SCNC: 4.6 MMOL/L (ref 3.5–5.2)
PROT SERPL-MCNC: 7 G/DL (ref 6–8.5)
PROT UR QL STRIP: NEGATIVE
RBC # BLD AUTO: 4.37 10*6/MM3 (ref 3.77–5.28)
RBC # UR STRIP: NORMAL /HPF
REF LAB TEST METHOD: NORMAL
SODIUM SERPL-SCNC: 137 MMOL/L (ref 136–145)
SP GR UR STRIP: 1.01 (ref 1–1.03)
SQUAMOUS #/AREA URNS HPF: NORMAL /HPF
TRIGL SERPL-MCNC: 68 MG/DL (ref 0–150)
TSH SERPL DL<=0.05 MIU/L-ACNC: 1.14 UIU/ML (ref 0.27–4.2)
UROBILINOGEN UR QL STRIP: NORMAL
VLDLC SERPL-MCNC: 13 MG/DL (ref 5–40)
WBC # UR STRIP: NORMAL /HPF
WBC NRBC COR # BLD: 7.35 10*3/MM3 (ref 3.4–10.8)

## 2022-12-13 PROCEDURE — 82306 VITAMIN D 25 HYDROXY: CPT

## 2022-12-13 PROCEDURE — 83090 ASSAY OF HOMOCYSTEINE: CPT

## 2022-12-13 PROCEDURE — 80053 COMPREHEN METABOLIC PANEL: CPT

## 2022-12-13 PROCEDURE — 81001 URINALYSIS AUTO W/SCOPE: CPT

## 2022-12-13 PROCEDURE — 85025 COMPLETE CBC W/AUTO DIFF WBC: CPT

## 2022-12-13 PROCEDURE — 84443 ASSAY THYROID STIM HORMONE: CPT

## 2022-12-13 PROCEDURE — 80061 LIPID PANEL: CPT

## 2023-01-09 ENCOUNTER — TELEMEDICINE (OUTPATIENT)
Dept: INTERNAL MEDICINE | Facility: CLINIC | Age: 68
End: 2023-01-09
Payer: MEDICARE

## 2023-01-09 DIAGNOSIS — Z00.00 MEDICARE ANNUAL WELLNESS VISIT, SUBSEQUENT: ICD-10-CM

## 2023-01-09 DIAGNOSIS — F51.01 PRIMARY INSOMNIA: ICD-10-CM

## 2023-01-09 DIAGNOSIS — K21.9 GASTROESOPHAGEAL REFLUX DISEASE WITHOUT ESOPHAGITIS: ICD-10-CM

## 2023-01-09 DIAGNOSIS — K59.01 CONSTIPATION BY DELAYED COLONIC TRANSIT: ICD-10-CM

## 2023-01-09 DIAGNOSIS — U07.1 COVID-19 VIRUS INFECTION: Primary | Chronic | ICD-10-CM

## 2023-01-09 DIAGNOSIS — E78.00 HYPERCHOLESTEROLEMIA: ICD-10-CM

## 2023-01-09 PROCEDURE — 1159F MED LIST DOCD IN RCRD: CPT | Performed by: INTERNAL MEDICINE

## 2023-01-09 PROCEDURE — 99397 PER PM REEVAL EST PAT 65+ YR: CPT | Performed by: INTERNAL MEDICINE

## 2023-01-09 PROCEDURE — 1160F RVW MEDS BY RX/DR IN RCRD: CPT | Performed by: INTERNAL MEDICINE

## 2023-01-09 PROCEDURE — G0439 PPPS, SUBSEQ VISIT: HCPCS | Performed by: INTERNAL MEDICINE

## 2023-01-09 PROCEDURE — 1170F FXNL STATUS ASSESSED: CPT | Performed by: INTERNAL MEDICINE

## 2023-01-09 NOTE — PROGRESS NOTES
The ABCs of the Annual Wellness Visit  Initial Medicare Wellness Visit    CC:Annual wellness  Subjective   History of Present Illness:  Gricelda Matos is a 67 y.o. female who presents for an Initial Medicare Wellness Visit. She is feeling well.     History of COVID-19  She was diagnosed with COVID-19 a few days ago by home COVID-19 test. She had minimal symptoms with mild fatigue. She states that she really would not have known except that she tested because her children had COVID-19. She has now tested negative today, which is her 6th day. She has been working from home while she has been quarantining.     Blood pressure  Her blood pressures do very well around 120/70 mmHg or less.     Hypercholesterolemia  She is currently taking pravastatin every evening for hypercholesterolemia. She eats a low-fat diet. She exercises vigorously most days of the week. She did labs in 12/2022 and her LDL was very good at 96. It improved from 122 down to 96 after increasing the pravastatin to 20 mg every evening. She denies any side effects with the medication.    Osteopenia  She does take vitamin D and does weightbearing exercises for osteopenia. She also takes some calcium.     Constipation  She is currently taking MiraLAX every evening for chronic constipation. She saw LARISA Rowe, gastroenterology, for some fecal urgency when she was out walking in the mornings. She was advised to avoid popcorn and corn on the cob. She was also advised to avoid dairy products. She is doing better since she is avoiding those foods. She states that she does much better overall if she takes the MiraLAX every day and keeps the stools moving and in a routine.     Immunizations  She is up to date on vaccinations including influenza, COVID-19, Shingrix, Pneumovax, and Tdap.     Health maintenance  She is up to date on mammogram and DEXA scan. She uses alcohol very sparingly. She does not smoke.     Falls  She has not had any falls.      Folate  deficiency  Her folate level was still low, but she is taking the l-methyl folate daily.     GERD  She is currently taking omeprazole daily for GERD symptoms. She feels that she has acid reflux if she does not take it.     Insomnia  She does take zolpidem sparingly for insomnia when she is traveling or her sleep schedule is significantly off.     Depression  She has not felt down or depressed.     Memory  There is no sign of memory disorder.     2022 Labs  LDL- 96, previously 122.  Folate- low.  Blood glucose- WNL.  Creatinine, BUN- WNL.  LFTs- WNL.  Thyroid- WNL.  Vitamin D- WNL.  CBC- WNL.     CHRONIC CONDITIONS:                HEALTH RISK ASSESSMENT    Recent Hospitalizations:  She was not admitted to the hospital during the last year.       Current Medical Providers:  Patient Care Team:  Agata Miner MD as PCP - General  Agata Miner MD as PCP - Family Medicine    Smoking Status:  Social History     Tobacco Use   Smoking Status Former   • Packs/day: 0.50   • Years: 4.00   • Pack years: 2.00   • Types: Cigarettes   • Quit date:    • Years since quittin.0   Smokeless Tobacco Never   Tobacco Comments    Quit        Alcohol Consumption:  Social History     Substance and Sexual Activity   Alcohol Use Yes   • Alcohol/week: 1.0 standard drink   • Types: 1 Glasses of wine per week    Comment: daILY       Depression Screen:   PHQ-2/PHQ-9 Depression Screening 2023   Retired PHQ-9 Total Score -   Retired Total Score -   Little Interest or Pleasure in Doing Things 0-->not at all   Feeling Down, Depressed or Hopeless 0-->not at all   PHQ-9: Brief Depression Severity Measure Score 0       Fall Risk Screen:  STEADI Fall Risk Assessment was completed, and patient is at LOW risk for falls.Assessment completed on:2023    Health Habits and Functional and Cognitive Screening:  Functional & Cognitive Status 2023   Do you have difficulty preparing food and eating? No   Do you have difficulty  bathing yourself, getting dressed or grooming yourself? No   Do you have difficulty using the toilet? No   Do you have difficulty moving around from place to place? No   Do you have trouble with steps or getting out of a bed or a chair? No   Current Diet Well Balanced Diet   Dental Exam Up to date        Dental Exam Comment -   Eye Exam Up to date        Eye Exam Comment -   Exercise (times per week) 7 times per week   Current Exercises Include Aerobics;Walking        Exercise Comment -   Do you need help using the phone?  No   Are you deaf or do you have serious difficulty hearing?  No   Do you need help with transportation? No   Do you need help shopping? No   Do you need help preparing meals?  No   Do you need help with housework?  No   Do you need help with laundry? No   Do you need help taking your medications? No   Do you need help managing money? No   Do you ever drive or ride in a car without wearing a seat belt? No   Have you felt unusual stress, anger or loneliness in the last month? No   Who do you live with? Spouse   If you need help, do you have trouble finding someone available to you? No   Have you been bothered in the last four weeks by sexual problems? No   Do you have difficulty concentrating, remembering or making decisions? No         Age-appropriate Screening Schedule:  Refer to the list below for future screening recommendations based on patient's age, sex and/or medical conditions. Orders for these recommended tests are listed in the plan section. The patient has been provided with a written plan.    Health Maintenance   Topic Date Due   • PAP SMEAR  05/18/2022   • MAMMOGRAM  02/17/2023   • LIPID PANEL  12/13/2023   • DXA SCAN  04/12/2024   • TDAP/TD VACCINES (3 - Td or Tdap) 10/08/2028   • INFLUENZA VACCINE  Completed   • ZOSTER VACCINE  Completed        The following portions of the patient's history were reviewed and updated as appropriate: allergies, current medications, past family  history, past medical history, past social history, past surgical history and problem list.    Does the patient have evidence of cognitive impairment? No    Aspirin is not on active medication list.  Aspirin use is not indicated based on review of current medical condition/s. Risk of harm outweighs potential benefits.  .    Outpatient Medications Prior to Visit   Medication Sig Dispense Refill   • Calcium Carbonate-Vitamin D (CALTRATE 600+D PO) Take 1,000 mg by mouth Daily.     • estradiol (VAGIFEM) 10 MCG tablet vaginal tablet Invanz start 1 tablet into the vagina 3 times a week 8 tablet 0   • l-methylfolate 15 MG tablet tablet Take 1 tablet by mouth Daily. 90 tablet 3   • melatonin 5 MG tablet tablet Take 5 mg by mouth At Night As Needed.     • mupirocin (BACTROBAN) 2 % ointment Apply 1 application topically to the appropriate area as directed 2 (Two) Times a Day As Needed (irritation). 22 g 0   • omeprazole (priLOSEC) 20 MG capsule Take 1 capsule by mouth Daily. 90 capsule 3   • phenazopyridine (Pyridium) 200 MG tablet Take 1 tablet by mouth 3 (Three) Times a Day As Needed for Bladder Spasms. 9 tablet 0   • polycarbophil 625 MG tablet tablet Take 625 mg by mouth Daily.     • polyethylene glycol (MIRALAX) 17 GM/SCOOP powder Take 17 g by mouth Daily.     • pravastatin (PRAVACHOL) 20 MG tablet Take 1 tablet by mouth Every Night. 90 tablet 0   • zolpidem (AMBIEN) 5 MG tablet Take 1 tablet by mouth At Night As Needed for Sleep. 10 tablet 0   • HYDROcod Polst-CPM Polst ER (Tussionex Pennkinetic ER) 10-8 MG/5ML ER suspension Take 5 mL by mouth Every 12 (Twelve) Hours As Needed for Cough. 60 mL 0   • Peppermint Oil (IBgard) 90 MG capsule controlled-release Take 2 capsules by mouth Every Morning.       No facility-administered medications prior to visit.       Patient Active Problem List   Diagnosis   • Homocysteinemia   • GERD (gastroesophageal reflux disease)   • Burning mouth syndrome   • Vitamin D deficiency   •  Cervicalgia   • Hypercholesterolemia   • Constipation by delayed colonic transit   • Benign meningioma of brain (HCC)   • Leg pain, right   • Allergic rhinitis   • Bilateral hip pain   • Folate deficiency   • Former smoker   • Insomnia   • Menopause   • Ovarian cyst   • Hip pain, left   • Pap test, as part of routine gynecological examination   • Atrophic vaginitis   • Annual physical exam   • Pain of right upper arm   • Upper back pain on right side   • Neck fullness   • Urinary urgency   • Seborrheic dermatitis of scalp   • Medicare annual wellness visit, subsequent   • Acute cough   • Acute right-sided low back pain without sciatica   • COVID-19 virus infection       Advanced Care Planning:  Advance Directive is on file.  ACP discussion was held with the patient during this visit. Patient has an advance directive in EMR which is still valid.     Review of Systems  The appropriate review of systems is included in the HPI.    Compared to one year ago, the patient feels her physical health is the same.  Compared to one year ago, the patient feels her mental health is the same.    Reviewed chart for potential of high risk medication in the elderly: yes  Reviewed chart for potential of harmful drug interactions in the elderly:yes    Objective       There were no vitals filed for this visit.  BMI is >= 25 and <30. (Overweight) The following options were offered after discussion;: exercise counseling/recommendations and nutrition counseling/recommendations    There is no height or weight on file to calculate BMI.  Discussed the patient's BMI with her. The BMI is in the acceptable range.    Physical Exam  Constitutional:       Appearance: She is normal weight.   HENT:      Head: Normocephalic.   Eyes:      Extraocular Movements: Extraocular movements intact.      Conjunctiva/sclera: Conjunctivae normal.      Pupils: Pupils are equal, round, and reactive to light.   Pulmonary:      Effort: Pulmonary effort is normal.    Neurological:      General: No focal deficit present.      Mental Status: She is alert and oriented to person, place, and time.   Psychiatric:         Mood and Affect: Mood normal.         Thought Content: Thought content normal.         Judgment: Judgment normal.         Lab Results   Component Value Date    TRIG 68 12/13/2022    HDL 58 12/13/2022    LDL 96 12/13/2022    VLDL 13 12/13/2022        Assessment & Plan   Medicare Risks and Personalized Health Plan  CMS Preventative Services Quick Reference  Cardiovascular risk  Osteoporosis Risk    The above risks/problems have been discussed with the patient.  Pertinent information has been shared with the patient in the After Visit Summary.  Follow up plans and orders are seen below in the Assessment/Plan Section.  Patient Instructions   Problem List Items Addressed This Visit        Cardiac and Vasculature    Hypercholesterolemia    Overview     Taking pravastatin.         Current Assessment & Plan     She will continue pravastatin every evening. She will continue regular exercise and low-fat diet.         Relevant Medications    pravastatin (PRAVACHOL) 20 MG tablet       Gastrointestinal Abdominal     GERD (gastroesophageal reflux disease)    Overview     Taking omeprazole daily.         Current Assessment & Plan     She will continue taking omeprazole daily. We discussed the benefit of taking it and controlling the acid and the very small risk that it will decrease her calcium intake. She will continue to avoid eating close to bedtime and avoid large meals.          Relevant Medications    omeprazole (priLOSEC) 20 MG capsule    Constipation by delayed colonic transit    Current Assessment & Plan     She will continue taking MiraLAX daily. She will continue drinking lots of fluids.            Health Encounters    Medicare annual wellness visit, subsequent       Sleep    Insomnia    Overview     Takes melatonin with calcium magnesium tablets and probiotic every  evening.  Reserves Ambien for rare circumstances.             Current Assessment & Plan     She will continue to try melatonin as needed. She will continue to reserve Ambien for specific circumstances. We discussed sleep hygiene including going to bed at the same time and getting up at the same time every day, going to bed early enough to get 7 or 8 hours in bed, reading and relaxing before bedtime, and avoiding the TV, computer, phone, iPad close to bedtime.         Relevant Medications    zolpidem (AMBIEN) 5 MG tablet       Other    COVID-19 virus infection - Primary (Chronic)    Current Assessment & Plan     She is now testing negative on day 6, so she may go out of the house, but she is advised to wear a mask if she will be close to people for about five more days.             Follow Up:  Next Medicare Wellness visit to be scheduled in 1 year.    Return in about 6 months (around 7/9/2023) for recheck fasting.    An After Visit Summary and PPPS were given to the patient.        Patient was given instructions and counseling regarding her condition or for health maintenance advice. Please see specific information pulled into the AVS if appropriate.    Transcribed from ambient dictation for Agata Miner MD by Cece Green.  01/09/23   19:55 EST    Patient or patient representative verbalized consent to the visit recording.  I have personally performed the services described in this document as transcribed by the above individual, and it is both accurate and complete.  Agata Miner MD  1/14/2023  15:19 EST

## 2023-01-10 NOTE — ASSESSMENT & PLAN NOTE
She is now testing negative on day 6, so she may go out of the house, but she is advised to wear a mask if she will be close to people for about five more days.

## 2023-01-10 NOTE — ASSESSMENT & PLAN NOTE
She will continue taking omeprazole daily. We discussed the benefit of taking it and controlling the acid and the very small risk that it will decrease her calcium intake. She will continue to avoid eating close to bedtime and avoid large meals.

## 2023-01-10 NOTE — ASSESSMENT & PLAN NOTE
She will continue to try melatonin as needed. She will continue to reserve Ambien for specific circumstances. We discussed sleep hygiene including going to bed at the same time and getting up at the same time every day, going to bed early enough to get 7 or 8 hours in bed, reading and relaxing before bedtime, and avoiding the TV, computer, phone, iPad close to bedtime.

## 2023-01-14 NOTE — PATIENT INSTRUCTIONS
Patient Instructions   Problem List Items Addressed This Visit          Cardiac and Vasculature    Hypercholesterolemia    Overview     Taking pravastatin.         Current Assessment & Plan     She will continue pravastatin every evening. She will continue regular exercise and low-fat diet.         Relevant Medications    pravastatin (PRAVACHOL) 20 MG tablet       Gastrointestinal Abdominal     GERD (gastroesophageal reflux disease)    Overview     Taking omeprazole daily.         Current Assessment & Plan     She will continue taking omeprazole daily. We discussed the benefit of taking it and controlling the acid and the very small risk that it will decrease her calcium intake. She will continue to avoid eating close to bedtime and avoid large meals.          Relevant Medications    omeprazole (priLOSEC) 20 MG capsule    Constipation by delayed colonic transit    Current Assessment & Plan     She will continue taking MiraLAX daily. She will continue drinking lots of fluids.            Health Encounters    Medicare annual wellness visit, subsequent       Sleep    Insomnia    Overview     Takes melatonin with calcium magnesium tablets and probiotic every evening.  Reserves Ambien for rare circumstances.             Current Assessment & Plan     She will continue to try melatonin as needed. She will continue to reserve Ambien for specific circumstances. We discussed sleep hygiene including going to bed at the same time and getting up at the same time every day, going to bed early enough to get 7 or 8 hours in bed, reading and relaxing before bedtime, and avoiding the TV, computer, phone, iPad close to bedtime.         Relevant Medications    zolpidem (AMBIEN) 5 MG tablet       Other    COVID-19 virus infection - Primary (Chronic)    Current Assessment & Plan     She is now testing negative on day 6, so she may go out of the house, but she is advised to wear a mask if she will be close to people for about five more  "days.           BMI for Adults  What is BMI?  Body mass index (BMI) is a number that is calculated from a person's weight and height. BMI can help estimate how much of a person's weight is composed of fat. BMI does not measure body fat directly. Rather, it is an alternative to procedures that directly measure body fat, which can be difficult and expensive.  BMI can help identify people who may be at higher risk for certain medical problems.  What are BMI measurements used for?  BMI is used as a screening tool to identify possible weight problems. It helps determine whether a person is obese, overweight, a healthy weight, or underweight.  BMI is useful for:  Identifying a weight problem that may be related to a medical condition or may increase the risk for medical problems.  Promoting changes, such as changes in diet and exercise, to help reach a healthy weight. BMI screening can be repeated to see if these changes are working.  How is BMI calculated?  BMI involves measuring your weight in relation to your height. Both height and weight are measured, and the BMI is calculated from those numbers. This can be done either in English ([place] or metric measurements. Note that charts and online BMI calculators are available to help you find your BMI quickly and easily without having to do these calculations yourself.  To calculate your BMI in English ([place] measurements:    Measure your weight in pounds (lb).  Multiply the number of pounds by 703.  For example, for a person who weighs 180 lb, multiply that number by 703, which equals [phone].  Measure your height in inches. Then multiply that number by itself to get a measurement called \"inches squared.\"  For example, for a person who is 70 inches tall, the \"inches squared\" measurement is 70 inches x 70 inches, which equals 4,900 inches squared.  Divide the total from step 2 (number of lb x 703) by the total from step 3 (inches squared): 126,540 ÷ 4,900 = 25.8. This is " "your BMI.  To calculate your BMI in metric measurements:  Measure your weight in kilograms (kg).  Measure your height in meters (m). Then multiply that number by itself to get a measurement called \"meters squared.\"  For example, for a person who is 1.75 m tall, the \"meters squared\" measurement is 1.75 m x 1.75 m, which is equal to 3.1 meters squared.  Divide the number of kilograms (your weight) by the meters squared number. In this example: 70 ÷ 3.1 = 22.6. This is your BMI.  What do the results mean?  BMI charts are used to identify whether you are underweight, normal weight, overweight, or obese. The following guidelines will be used:  Underweight: BMI less than 18.5.  Normal weight: BMI between 18.5 and 24.9.  Overweight: BMI between 25 and 29.9.  Obese: BMI of 30 or above.  Keep these notes in mind:  Weight includes both fat and muscle, so someone with a muscular build, such as an athlete, may have a BMI that is higher than 24.9. In cases like these, BMI is not an accurate measure of body fat.  To determine if excess body fat is the cause of a BMI of 25 or higher, further assessments may need to be done by a health care provider.  BMI is usually interpreted in the same way for men and women.  Where to find more information  For more information about BMI, including tools to quickly calculate your BMI, go to these websites:  Centers for Disease Control and Prevention: www.cdc.gov  American Heart Association: www.heart.org  National Heart, Lung, and Blood [place] Level: www.nhlbi.nih.gov  Summary  Body mass index (BMI) is a number that is calculated from a person's weight and height.  BMI may help estimate how much of a person's weight is composed of fat. BMI can help identify those who may be at higher risk for certain medical problems.  BMI can be measured using English measurements or metric measurements.  BMI charts are used to identify whether you are underweight, normal weight, overweight, or obese.  This " information is not intended to replace advice given to you by your health care provider. Make sure you discuss any questions you have with your health care provider.  Document Revised: 09/09/2020 Document Reviewed: 07/17/2020  [employer] Patient Education © 2022 [employer]

## 2023-02-13 DIAGNOSIS — E78.00 HYPERCHOLESTEROLEMIA: ICD-10-CM

## 2023-02-14 RX ORDER — PRAVASTATIN SODIUM 20 MG
20 TABLET ORAL NIGHTLY
Qty: 90 TABLET | Refills: 0 | Status: SHIPPED | OUTPATIENT
Start: 2023-02-14

## 2023-03-02 PROBLEM — M85.832 OSTEOPENIA OF LEFT FOREARM: Chronic | Status: ACTIVE | Noted: 2023-03-02

## 2023-04-14 ENCOUNTER — OFFICE VISIT (OUTPATIENT)
Dept: INTERNAL MEDICINE | Facility: CLINIC | Age: 68
End: 2023-04-14
Payer: MEDICARE

## 2023-04-14 VITALS
HEART RATE: 74 BPM | WEIGHT: 127.2 LBS | HEIGHT: 60 IN | SYSTOLIC BLOOD PRESSURE: 110 MMHG | DIASTOLIC BLOOD PRESSURE: 58 MMHG | BODY MASS INDEX: 24.97 KG/M2 | TEMPERATURE: 97.8 F | OXYGEN SATURATION: 97 %

## 2023-04-14 DIAGNOSIS — I99.8 FLUCTUATING BLOOD PRESSURE: Primary | Chronic | ICD-10-CM

## 2023-04-14 DIAGNOSIS — R42 LIGHTHEADED: Chronic | ICD-10-CM

## 2023-04-14 DIAGNOSIS — E78.00 HYPERCHOLESTEROLEMIA: ICD-10-CM

## 2023-04-14 PROCEDURE — 1159F MED LIST DOCD IN RCRD: CPT | Performed by: INTERNAL MEDICINE

## 2023-04-14 PROCEDURE — 99214 OFFICE O/P EST MOD 30 MIN: CPT | Performed by: INTERNAL MEDICINE

## 2023-04-14 PROCEDURE — 93000 ELECTROCARDIOGRAM COMPLETE: CPT | Performed by: INTERNAL MEDICINE

## 2023-04-14 PROCEDURE — 1160F RVW MEDS BY RX/DR IN RCRD: CPT | Performed by: INTERNAL MEDICINE

## 2023-04-14 NOTE — PROGRESS NOTES
"[facility]     Gricelda Matos  1955   [id number]      Patient Care Team:  Agata Miner MD as PCP - General  Agata Miner MD as PCP - Family Medicine    Chief Complaint   Patient presents with   • Hypertension            HPI  Patient is a 67 y.o. female presents with     The patient presents today for an office visit.    CHRONIC CONDITIONS  Hypertension  The patient's blood pressure is elevated today at 110/58 mmHg. She has been keeping a log of her blood pressure readings for the last 1.5 weeks because she was feeling \"off\" and dizzy intermittently. Her lowest reading was 90/50 mmHg, and her highest reading was 141/72 mmHg. Most of her readings are less than 130 systolic pressure. The patient states that her pulse is typically between 78 to 87 beats per minute. The patient does not think there was an increase of salt in her diet the day prior these higher readings. She denies stress or pain. The patient has been exercising a lot. She walks 14,000 to 19,000 steps per day. The patient did not obtain any morning blood pressure readings. She takes Dramamine occasionally to sleep; however, she has not taken it in the last week. The patient drinks wine during the day. She denies taking any over-the-counter ibuprofen or Aleve. The patient is drinking plenty of water. She denies any ear pain or fullness in the ears. The patient does not feel like she has vertigo. She denies any palpitations. The patient drinks wine occasionally in the evening.     Hypercholesterolemia  The patient is taking pravastatin for her cholesterol. She denies any muscle aching or other side effects.    Thyroid nodule  The patient had an ultrasound of the thyroid on 06/21/2022. which revealed several small nodules. Repeat imaging was not recommended at that time.     Eyelid surgery  The patient had eyelid surgery performed in 02/2023.      CHRONIC CONDITIONS      Past Medical History:   Diagnosis Date   • Abdominal " pain     Dr. Garvin felt from too much raw vegetables   • Arthritis    • Atypical squamous cells of undetermined significance (ASCUS) on Papanicolaou smear of cervix    • Brain hemangioma     benign-(incidental finding on MRI)   • Burning tongue syndrome    • Cough    • Elevated homocysteine     MTHFR pos for 2 copies of [id number] mutation,neg for C677T mutation   • Fungal toenail infection 2019 Agata Miner MD  Continue fluconazole weekly per Dr. Romero.  Follow-up with him as planned.   • Glossitis 2019   • Heart murmur     No murmur noted for years.   • Hormone deficiency     Dr. Rod did hormone testing-she took HRT for about 6 months   • Mitral valve prolapse     Diagnosed when she was younger.  Asymptomatic.  Stress echo done by Dr.Avichai Liang in  was normal.   • Nocturnal cough 2019   • OA (osteoarthritis) 2016    severe OA of R hip (L hip mild)   • Open wound of finger with complication 2019   • Ovarian cyst    • Skin cancer     basal cell skin cancers on back   • Spine disorder     Lumbar spine degenerative changes/mild stenosis/moderate facet disease   • Zinc deficiency 2019       Past Surgical History:   Procedure Laterality Date   •  SECTION      x3; , , &    • COLONOSCOPY     • COLPOSCOPY  2004    by Dr. Bustos   • ENDOSCOPY      colonoscopy okay    • HIP ARTHROPLASTY Right 2016   • TONSILLECTOMY         Family History   Problem Relation Age of Onset   • Hypertension Mother    • Hypertension Father    • Cancer Father         Bladder   • Colon polyps Father    • Breast cancer Paternal Grandmother    • Colon cancer Maternal Grandmother    • Hypertension Maternal Grandmother    • Coronary artery disease Maternal Grandfather 65         of MI at 65   • Other Daughter 28        MTH-FR gene mutation       Social History     Socioeconomic History   • Marital status:    Tobacco Use   • Smoking  "status: Former     Packs/day: 0.50     Years: 4.00     Pack years: 2.00     Types: Cigarettes     Quit date:      Years since quittin.3   • Smokeless tobacco: Never   • Tobacco comments:     Quit    Substance and Sexual Activity   • Alcohol use: Yes     Alcohol/week: 1.0 standard drink     Types: 1 Glasses of wine per week     Comment: daILY   • Drug use: No   • Sexual activity: Defer       Allergies   Allergen Reactions   • Contrast Dye (Echo Or Unknown Ct/Mr) Hives   • Cyclobenzaprine Unknown (See Comments)     Unknown   • Naproxen Sodium Other (See Comments)     Excessive bruising   Aleive        Review of Systems:     Review of Systems    Vital Signs  Vitals:    23 1533   BP: 110/58   BP Location: Left arm   Patient Position: Sitting   Cuff Size: Adult   Pulse: 74   Temp: 97.8 °F (36.6 °C)   TempSrc: Infrared   SpO2: 97%   Weight: 57.7 kg (127 lb 3.2 oz)   Height: 152.9 cm (60.2\")     Body mass index is 24.68 kg/m².  BMI is within normal parameters. No other follow-up for BMI required.        Current Outpatient Medications:   •  Calcium Carbonate-Vitamin D (CALTRATE 600+D PO), Take 1,500 mg by mouth Daily., Disp: , Rfl:   •  estradiol (VAGIFEM) 10 MCG tablet vaginal tablet, Invanz start 1 tablet into the vagina 3 times a week, Disp: 8 tablet, Rfl: 0  •  l-methylfolate 15 MG tablet tablet, Take 1 tablet by mouth Daily., Disp: 90 tablet, Rfl: 3  •  melatonin 5 MG tablet tablet, Take 1 tablet by mouth At Night As Needed., Disp: , Rfl:   •  mupirocin (BACTROBAN) 2 % ointment, Apply 1 application topically to the appropriate area as directed 2 (Two) Times a Day As Needed (irritation)., Disp: 22 g, Rfl: 0  •  omeprazole (priLOSEC) 20 MG capsule, Take 1 capsule by mouth Daily., Disp: 90 capsule, Rfl: 3  •  phenazopyridine (Pyridium) 200 MG tablet, Take 1 tablet by mouth 3 (Three) Times a Day As Needed for Bladder Spasms., Disp: 9 tablet, Rfl: 0  •  polyethylene glycol (MIRALAX) 17 GM/SCOOP powder, " Take 17 g by mouth Daily., Disp: , Rfl:   •  pravastatin (PRAVACHOL) 20 MG tablet, Take 1 tablet by mouth Every Night., Disp: 90 tablet, Rfl: 0  •  Psyllium (KONSYL DAILY FIBER PO), Take  by mouth Daily., Disp: , Rfl:   •  zolpidem (AMBIEN) 5 MG tablet, Take 1 tablet by mouth At Night As Needed for Sleep., Disp: 10 tablet, Rfl: 0    Physical Exam:    Physical Exam  Constitutional:       General: She is not in acute distress.     Appearance: She is well-developed.   HENT:      Head: Normocephalic and atraumatic.      Right Ear: External ear normal.      Left Ear: External ear normal.   Eyes:      Conjunctiva/sclera: Conjunctivae normal.      Pupils: Pupils are equal, round, and reactive to light.   Cardiovascular:      Rate and Rhythm: Normal rate and regular rhythm.      Heart sounds: No murmur heard.  Pulmonary:      Effort: Pulmonary effort is normal. No respiratory distress.      Breath sounds: Normal breath sounds. No wheezing.   Abdominal:      General: Bowel sounds are normal. There is no distension.      Palpations: Abdomen is soft.      Tenderness: There is no abdominal tenderness.   Musculoskeletal:         General: Normal range of motion.      Cervical back: Normal range of motion and neck supple.   Lymphadenopathy:      Cervical: No cervical adenopathy.   Skin:     General: Skin is warm and dry.   Neurological:      Mental Status: She is alert and oriented to person, place, and time.      Cranial Nerves: No cranial nerve deficit.            ECG 12 Lead    Date/Time: 4/14/2023 4:37 PM  Performed by: Agata Miner MD  Authorized by: Agata Miner MD   Comparison: compared with previous ECG   Similar to previous ECG  Rhythm: sinus rhythm  Rate: normal  BPM: 67  Conduction: conduction normal  ST Segments: ST segments normal  T Waves: T waves normal  QRS axis: normal    Clinical impression: normal ECG               ACE III MINI        Results Review:    I reviewed the patient's new clinical  results.    CMP:  Lab Results   Component Value Date    BUN 19 12/13/2022    CREATININE 0.81 12/13/2022    EGFRIFNONA 78 01/18/2022    BCR 23.5 12/13/2022     12/13/2022    K 4.6 12/13/2022    CO2 26.3 12/13/2022    CALCIUM 9.2 12/13/2022    ALBUMIN 4.50 12/13/2022    BILITOT 0.3 12/13/2022    ALKPHOS 67 12/13/2022    AST 28 12/13/2022    ALT 25 12/13/2022     HbA1c:  Lab Results   Component Value Date    HGBA1C 5.3 07/13/2022    HGBA1C 4.80 09/14/2020     Microalbumin:  No results found for: MICROALBUR, POCMALB, POCCREAT  Lipid Panel  Lab Results   Component Value Date    CHOL 167 12/13/2022    TRIG 68 12/13/2022    HDL 58 12/13/2022    LDL 96 12/13/2022    AST 28 12/13/2022    ALT 25 12/13/2022       Medication Review: Medications reviewed and noted  Patient Instructions   Problem List Items Addressed This Visit        Cardiac and Vasculature    Fluctuating blood pressure - Primary (Chronic)    Current Assessment & Plan     She is having readings from the 90s up to low 140s. Most of her blood pressure readings from home are in the 120s. Her diastolic readings are almost always 70s to low 80s. Most likely due to variance of salt in the diet. She does not feels stressed lately and has no pain. She is working out regularly. We will continue to monitor the blood pressures but just check it a few times a week noting if she had any symptoms at the time of any of the readings. We will have her come back and follow up in about a month or so. If she develops any other symptoms, she will let us know. We did labs including blood cell counts, renal, liver function, and thyroid in 12/2022 and all looked good.         Relevant Orders    ECG 12 Lead    Hypercholesterolemia    Overview     Taking pravastatin.         Current Assessment & Plan     She will continue pravastatin nightly. Continue regular exercise and low fat diet.         Relevant Medications    pravastatin (PRAVACHOL) 20 MG tablet       Symptoms and Signs     Lightheaded (Chronic)    Current Assessment & Plan     Unsure of etiology. Bilateral ear exam is benign. She does not have any palpitations or rapid heartbeat. The lightheadedness may correlate with when the blood pressures are higher but not sure. We will continue to monitor her symptoms. She will drink plenty of fluids every day. She will continue regular exercise.               Diagnosis Plan   1. Fluctuating blood pressure  ECG 12 Lead      2. Lightheaded        3. Hypercholesterolemia                Plan of care reviewed with patient at the conclusion of today's visit. Education was provided regarding diagnosis, management, and any prescribed or recommended OTC medications.Patient verbalizes understanding of and agreement with management plan.         Agata Miner MD      Transcribed from ambient dictation for Agata Miner MD by Cece Quintanilla.  04/14/23   18:48 EDT    Patient or patient representative verbalized consent to the visit recording.  I have personally performed the services described in this document as transcribed by the above individual, and it is both accurate and complete.  Agata Miner MD  4/15/2023  19:10 EDT

## 2023-04-14 NOTE — ASSESSMENT & PLAN NOTE
She is having readings from the 90s up to low 140s. Most of her blood pressure readings from home are in the 120s. Her diastolic readings are almost always 70s to low 80s. Most likely due to variance of salt in the diet. She does not feels stressed lately and has no pain. She is working out regularly. We will continue to monitor the blood pressures but just check it a few times a week noting if she had any symptoms at the time of any of the readings. We will have her come back and follow up in about a month or so. If she develops any other symptoms, she will let us know. We did labs including blood cell counts, renal, liver function, and thyroid in 12/2022 and all looked good.

## 2023-04-14 NOTE — ASSESSMENT & PLAN NOTE
Unsure of etiology. Bilateral ear exam is benign. She does not have any palpitations or rapid heartbeat. The lightheadedness may correlate with when the blood pressures are higher but not sure. We will continue to monitor her symptoms. She will drink plenty of fluids every day. She will continue regular exercise.

## 2023-04-15 NOTE — PATIENT INSTRUCTIONS
Patient Instructions   Problem List Items Addressed This Visit          Cardiac and Vasculature    Fluctuating blood pressure - Primary (Chronic)    Current Assessment & Plan     She is having readings from the 90s up to low 140s. Most of her blood pressure readings from home are in the 120s. Her diastolic readings are almost always 70s to low 80s. Most likely due to variance of salt in the diet. She does not feels stressed lately and has no pain. She is working out regularly. We will continue to monitor the blood pressures but just check it a few times a week noting if she had any symptoms at the time of any of the readings. We will have her come back and follow up in about a month or so. If she develops any other symptoms, she will let us know. We did labs including blood cell counts, renal, liver function, and thyroid in 12/2022 and all looked good.         Relevant Orders    ECG 12 Lead    Hypercholesterolemia    Overview     Taking pravastatin.         Current Assessment & Plan     She will continue pravastatin nightly. Continue regular exercise and low fat diet.         Relevant Medications    pravastatin (PRAVACHOL) 20 MG tablet       Symptoms and Signs    Lightheaded (Chronic)    Current Assessment & Plan     Unsure of etiology. Bilateral ear exam is benign. She does not have any palpitations or rapid heartbeat. The lightheadedness may correlate with when the blood pressures are higher but not sure. We will continue to monitor her symptoms. She will drink plenty of fluids every day. She will continue regular exercise.

## 2023-07-26 DIAGNOSIS — E78.00 HYPERCHOLESTEROLEMIA: ICD-10-CM

## 2023-07-26 RX ORDER — PRAVASTATIN SODIUM 20 MG
20 TABLET ORAL NIGHTLY
Qty: 30 TABLET | Refills: 5 | Status: SHIPPED | OUTPATIENT
Start: 2023-07-26

## 2023-09-21 DIAGNOSIS — N95.2 ATROPHIC VAGINITIS: ICD-10-CM

## 2023-09-21 RX ORDER — ESTRADIOL 10 UG/1
INSERT VAGINAL
Qty: 8 TABLET | Refills: 3 | Status: SHIPPED | OUTPATIENT
Start: 2023-09-21

## 2023-09-21 NOTE — TELEPHONE ENCOUNTER
Rx Refill Note  Requested Prescriptions     Pending Prescriptions Disp Refills    estradiol (VAGIFEM) 10 MCG tablet vaginal tablet [Pharmacy Med Name: ESTRADIOL 10MCG VAGINAL INS 10 Tablet] 8 tablet 3     Sig: INSERT 1 TABLET INTO THE VAGINA 2-3 TIMES WEEKLY      Last office visit with prescribing clinician: 4/14/2023   Last telemedicine visit with prescribing clinician: Visit date not found   Next office visit with prescribing clinician: Visit date not found                         Would you like a call back once the refill request has been completed: [] Yes [] No    If the office needs to give you a call back, can they leave a voicemail: [] Yes [] No    Hodan Luna RN  09/21/23, 13:34 EDT

## 2023-11-07 ENCOUNTER — OFFICE (OUTPATIENT)
Dept: URBAN - METROPOLITAN AREA CLINIC 4 | Facility: CLINIC | Age: 68
End: 2023-11-07

## 2023-11-07 VITALS — HEIGHT: 59 IN | DIASTOLIC BLOOD PRESSURE: 62 MMHG | WEIGHT: 127 LBS | SYSTOLIC BLOOD PRESSURE: 118 MMHG

## 2023-11-07 DIAGNOSIS — R19.4 CHANGE IN BOWEL HABIT: ICD-10-CM

## 2023-11-07 DIAGNOSIS — R15.2 FECAL URGENCY: ICD-10-CM

## 2023-11-07 DIAGNOSIS — R15.9 FULL INCONTINENCE OF FECES: ICD-10-CM

## 2023-11-07 DIAGNOSIS — R19.7 DIARRHEA, UNSPECIFIED: ICD-10-CM

## 2023-11-07 DIAGNOSIS — K21.9 GASTRO-ESOPHAGEAL REFLUX DISEASE WITHOUT ESOPHAGITIS: ICD-10-CM

## 2023-11-07 PROCEDURE — 99214 OFFICE O/P EST MOD 30 MIN: CPT | Performed by: NURSE PRACTITIONER

## 2023-11-07 RX ORDER — HYOSCYAMINE SULFATE 0.12 MG/1
TABLET, ORALLY DISINTEGRATING ORAL
Qty: 30 | Refills: 5 | Status: ACTIVE
Start: 2022-01-27

## 2023-11-09 NOTE — TELEPHONE ENCOUNTER
Rx Refill Note  Requested Prescriptions     Pending Prescriptions Disp Refills    l-methylfolate 15 MG tablet tablet [Pharmacy Med Name: DEPLIN 15 MG TABLET] 90 tablet 3     Sig: Take 1 tablet by mouth Daily.      Last office visit with prescribing clinician: 4/14/2023   Last telemedicine visit with prescribing clinician: Visit date not found   Next office visit with prescribing clinician: Visit date not found                         Would you like a call back once the refill request has been completed: [] Yes [] No    If the office needs to give you a call back, can they leave a voicemail: [] Yes [] No    Bri Gold LPN  11/09/23, 16:03 EST

## 2023-11-10 RX ORDER — LEVOMEFOLATE CALCIUM 15 MG
15 TABLET ORAL DAILY
Qty: 90 TABLET | Refills: 3 | Status: SHIPPED | OUTPATIENT
Start: 2023-11-10

## 2023-11-10 RX ORDER — LEVOMEFOLATE CALCIUM 15 MG
15 TABLET ORAL DAILY
Qty: 90 TABLET | Refills: 1 | Status: SHIPPED | OUTPATIENT
Start: 2023-11-10 | End: 2023-11-10

## 2023-11-10 NOTE — TELEPHONE ENCOUNTER
Rx Refill Note  Requested Prescriptions     Pending Prescriptions Disp Refills    l-methylfolate 15 MG tablet tablet 90 tablet 3     Sig: Take 1 tablet by mouth Daily.      Last office visit with prescribing clinician: 4/14/2023   Last telemedicine visit with prescribing clinician: Visit date not found   Next office visit with prescribing clinician: Visit date not found                         Would you like a call back once the refill request has been completed: [] Yes [] No    If the office needs to give you a call back, can they leave a voicemail: [] Yes [] No    Dalila Trevino LPN  11/10/23, 08:00 EST

## 2023-11-17 ENCOUNTER — TELEPHONE (OUTPATIENT)
Dept: OBSTETRICS AND GYNECOLOGY | Facility: CLINIC | Age: 68
End: 2023-11-17
Payer: MEDICARE

## 2023-12-01 ENCOUNTER — OFFICE VISIT (OUTPATIENT)
Dept: INTERNAL MEDICINE | Facility: CLINIC | Age: 68
End: 2023-12-01
Payer: MEDICARE

## 2023-12-01 VITALS
DIASTOLIC BLOOD PRESSURE: 68 MMHG | WEIGHT: 130 LBS | HEIGHT: 60 IN | HEART RATE: 76 BPM | SYSTOLIC BLOOD PRESSURE: 112 MMHG | BODY MASS INDEX: 25.52 KG/M2

## 2023-12-01 DIAGNOSIS — M54.2 NECK PAIN: ICD-10-CM

## 2023-12-01 DIAGNOSIS — T14.8XXA MUSCLE STRAIN: Primary | ICD-10-CM

## 2023-12-01 RX ORDER — CYCLOBENZAPRINE HCL 10 MG
10 TABLET ORAL 3 TIMES DAILY PRN
Qty: 30 TABLET | Refills: 0 | Status: SHIPPED | OUTPATIENT
Start: 2023-12-01
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
## 2023-12-20 RX ORDER — OMEPRAZOLE 20 MG/1
20 CAPSULE, DELAYED RELEASE ORAL DAILY
Qty: 30 CAPSULE | Refills: 3 | Status: SHIPPED | OUTPATIENT
Start: 2023-12-20

## 2023-12-20 NOTE — TELEPHONE ENCOUNTER
Rx Refill Note  Requested Prescriptions     Pending Prescriptions Disp Refills    omeprazole (priLOSEC) 20 MG capsule [Pharmacy Med Name: OMEPRAZOLE 20 MG CPDR 20 Capsule] 30 capsule 3     Sig: TAKE 1 CAPSULE BY MOUTH DAILY      Last office visit with prescribing clinician: 4/14/2023   Last telemedicine visit with prescribing clinician: Visit date not found   Next office visit with prescribing clinician: 3/11/2024                         Would you like a call back once the refill request has been completed: [] Yes [] No    If the office needs to give you a call back, can they leave a voicemail: [] Yes [] No    Dalila Trevino LPN  12/20/23, 16:05 EST

## 2024-01-15 ENCOUNTER — PATIENT MESSAGE (OUTPATIENT)
Dept: INTERNAL MEDICINE | Facility: CLINIC | Age: 69
End: 2024-01-15

## 2024-01-15 ENCOUNTER — OFFICE VISIT (OUTPATIENT)
Dept: INTERNAL MEDICINE | Facility: CLINIC | Age: 69
End: 2024-01-15
Payer: MEDICARE

## 2024-01-15 VITALS
DIASTOLIC BLOOD PRESSURE: 66 MMHG | HEIGHT: 60 IN | BODY MASS INDEX: 25.13 KG/M2 | TEMPERATURE: 98.2 F | WEIGHT: 128 LBS | HEART RATE: 72 BPM | SYSTOLIC BLOOD PRESSURE: 102 MMHG

## 2024-01-15 DIAGNOSIS — G89.29 CHRONIC LEFT SHOULDER PAIN: Primary | ICD-10-CM

## 2024-01-15 DIAGNOSIS — M25.512 CHRONIC LEFT SHOULDER PAIN: Primary | ICD-10-CM

## 2024-01-15 PROCEDURE — 99213 OFFICE O/P EST LOW 20 MIN: CPT

## 2024-01-15 PROCEDURE — 1159F MED LIST DOCD IN RCRD: CPT

## 2024-01-15 PROCEDURE — 1160F RVW MEDS BY RX/DR IN RCRD: CPT

## 2024-01-15 NOTE — PROGRESS NOTES
Acute Office Visit      Name: Gricelda Matos    : 1955     MRN: [id number]   Care Team: Patient Care Team:  Agata Miner MD as PCP - General  Agata Miner MD as PCP - Family Medicine    Chief Complaint  Arm Pain (left)    Subjective     History of Present Illness:  The patient is a 68-year-old female who presents for evaluation of left upper extremity pain.    At the end of 10/2023, she participated in intense yoga for several days. She has had pain in her left upper extremity since then. She works with a  and is constantly doing yoga and body exercises. She wonders if she needs to see a physical therapist. Her pain is worse when she sleeps on it. Her  does ultrasound heat, which she had Thursday, 2024, Friday, 2024, today, 01/15/2024, and will undergo tomorrow, 2024. The patient does well with forward movement but feels pain down her upper extremity when stretching behind. She did heavy lifting recently, which exacerbated it. She reports pain with extension and not with flexion. She has pain with seatbelt movement, reaching back and pulling forward. Her pain has improved since she altered her sleep by using a pillow to prevent lying on it. She was waking up during the night due to the pain. Her  recommended 2 Advil in the morning and 2 at night, which she has taken 1 tablet twice daily. Her pain has mildly improved.         Past Medical History:   Diagnosis Date    Abdominal pain     Dr. Garvin felt from too much raw vegetables    Arthritis     Atypical squamous cells of undetermined significance (ASCUS) on Papanicolaou smear of cervix 2004    Brain hemangioma 2014    benign-(incidental finding on MRI)    Burning tongue syndrome 2015    Cough     Elevated homocysteine     MTHFR pos for 2 copies of G5075O mutation,neg for C677T mutation    Fungal toenail infection 2019 Agata Miner MD  Continue fluconazole weekly per   Nick.  Follow-up with him as planned.    Glossitis 2019    Heart murmur     No murmur noted for years.    Hormone deficiency     Dr. Rod did hormone testing-she took HRT for about 6 months    Irritable bowel syndrome     It’s under control    Mitral valve prolapse     Diagnosed when she was younger.  Asymptomatic.  Stress echo done by Dr.Avichai Liang in  was normal.    Nocturnal cough 2019    OA (osteoarthritis) 2016    severe OA of R hip (L hip mild)    Open wound of finger with complication 2019    Ovarian cyst     Skin cancer     basal cell skin cancers on back    Spine disorder     Lumbar spine degenerative changes/mild stenosis/moderate facet disease    Zinc deficiency 2019       Past Surgical History:   Procedure Laterality Date     SECTION      x3; 1982, , &     COLONOSCOPY  2012    COLPOSCOPY  2004    by Dr. Bustos    ENDOSCOPY      colonoscopy okay     HIP ARTHROPLASTY Right 2016    JOINT REPLACEMENT  ,    Doing fine    TONSILLECTOMY         Social History     Socioeconomic History    Marital status:    Tobacco Use    Smoking status: Former     Packs/day: 0.50     Years: 4.00     Additional pack years: 0.00     Total pack years: 2.00     Types: Cigarettes     Quit date: 1976     Years since quittin.0    Smokeless tobacco: Never    Tobacco comments:     Quit    Substance and Sexual Activity    Alcohol use: Yes     Alcohol/week: 10.0 standard drinks of alcohol     Types: 10 Glasses of wine per week     Comment: daILY    Drug use: No    Sexual activity: Yes     Partners: Male     Birth control/protection: Same-sex partner         Current Outpatient Medications:     Calcium Carbonate-Vitamin D (CALTRATE 600+D PO), Take 1,500 mg by mouth Daily., Disp: , Rfl:     cyclobenzaprine (FLEXERIL) 10 MG tablet, Take 1 tablet by mouth 3 (Three) Times a Day As Needed for Muscle Spasms., Disp: 30 tablet, Rfl: 0     "dimenhyDRINATE (DRAMAMINE PO), Take 1 tablet by mouth At Night As Needed., Disp: , Rfl:     estradiol (VAGIFEM) 10 MCG tablet vaginal tablet, INSERT 1 TABLET INTO THE VAGINA 2-3 TIMES WEEKLY, Disp: 8 tablet, Rfl: 3    hyoscyamine (LEVSIN) 0.125 MG SL tablet, Place 1 tablet under the tongue Daily., Disp: , Rfl:     l-methylfolate 15 MG tablet tablet, Take 1 tablet by mouth Daily., Disp: 90 tablet, Rfl: 3    melatonin 5 MG tablet tablet, Take 1 tablet by mouth At Night As Needed., Disp: , Rfl:     mupirocin (BACTROBAN) 2 % ointment, Apply 1 application topically to the appropriate area as directed 2 (Two) Times a Day As Needed (irritation)., Disp: 22 g, Rfl: 0    omeprazole (priLOSEC) 20 MG capsule, TAKE 1 CAPSULE BY MOUTH DAILY, Disp: 30 capsule, Rfl: 3    phenazopyridine (Pyridium) 200 MG tablet, Take 1 tablet by mouth 3 (Three) Times a Day As Needed for Bladder Spasms., Disp: 9 tablet, Rfl: 0    polyethylene glycol (MIRALAX) 17 GM/SCOOP powder, Take 17 g by mouth Daily., Disp: , Rfl:     pravastatin (PRAVACHOL) 20 MG tablet, TAKE 1 TABLET BY MOUTH EVERY NIGHT, Disp: 30 tablet, Rfl: 5    Psyllium (KONSYL DAILY FIBER PO), Take  by mouth Daily., Disp: , Rfl:     zolpidem (AMBIEN) 5 MG tablet, Take 1 tablet by mouth At Night As Needed for Sleep., Disp: 10 tablet, Rfl: 0    Procedures    PHQ-9 Total Score:      Objective     Vital Signs  /66   Pulse 72   Temp 98.2 °F (36.8 °C)   Ht 152.5 cm (60.04\")   Wt 58.1 kg (128 lb)   BMI 24.97 kg/m²   Estimated body mass index is 24.97 kg/m² as calculated from the following:    Height as of this encounter: 152.5 cm (60.04\").    Weight as of this encounter: 58.1 kg (128 lb).    BMI is within normal parameters. No other follow-up for BMI required.      Physical Exam  Vitals and nursing note reviewed.   HENT:      Head: Normocephalic.   Musculoskeletal:      Right shoulder: Normal.      Left shoulder: Tenderness present. No swelling or deformity. Decreased range of motion. " Normal strength.   Skin:     General: Skin is warm and dry.   Neurological:      General: No focal deficit present.      Mental Status: She is alert and oriented to person, place, and time.   Psychiatric:         Mood and Affect: Mood normal.         Behavior: Behavior normal.         Thought Content: Thought content normal.         Judgment: Judgment normal.          Assessment and Plan     Assessment/Plan:  Diagnoses and all orders for this visit:    1. Chronic left shoulder pain (Primary)  - She will message me with the name of her previous physical therapist to be referred.   - If her pain continues after physical therapy, she will be referred to an orthopedist.     There are no Patient Instructions on file for this visit.  Plan of care reviewed with patient at the conclusion of today's visit. Education was provided regarding diagnosis, management and any prescribed or recommended OTC medications.  Patient verbalizes understanding of and agreement with management plan.    Follow Up  Return for Next Scheduled Follow up.    TUNDE Boothe     Transcribed from ambient dictation for TUNDE Boothe by Swati Paez.  01/15/24   11:59 EST    Patient or patient representative verbalized consent to the visit recording.  I have personally performed the services described in this document as transcribed by the above individual, and it is both accurate and complete.

## 2024-02-08 DIAGNOSIS — N95.2 ATROPHIC VAGINITIS: ICD-10-CM

## 2024-02-08 DIAGNOSIS — E78.00 HYPERCHOLESTEROLEMIA: ICD-10-CM

## 2024-02-08 RX ORDER — PRAVASTATIN SODIUM 20 MG
20 TABLET ORAL NIGHTLY
Qty: 30 TABLET | Refills: 5 | OUTPATIENT
Start: 2024-02-08

## 2024-02-08 RX ORDER — OMEPRAZOLE 20 MG/1
20 CAPSULE, DELAYED RELEASE ORAL DAILY
Qty: 90 CAPSULE | Refills: 3 | Status: SHIPPED | OUTPATIENT
Start: 2024-02-08

## 2024-02-08 RX ORDER — ESTRADIOL 10 UG/1
INSERT VAGINAL
Qty: 8 TABLET | Refills: 3 | OUTPATIENT
Start: 2024-02-08

## 2024-02-08 RX ORDER — PRAVASTATIN SODIUM 20 MG
20 TABLET ORAL NIGHTLY
Qty: 90 TABLET | Refills: 3 | Status: SHIPPED | OUTPATIENT
Start: 2024-02-08

## 2024-02-08 RX ORDER — ESTRADIOL 10 UG/1
INSERT VAGINAL
Qty: 15 TABLET | Refills: 3 | Status: SHIPPED | OUTPATIENT
Start: 2024-02-08

## 2024-02-08 NOTE — TELEPHONE ENCOUNTER
Rx Refill Note  Requested Prescriptions     Pending Prescriptions Disp Refills    pravastatin (PRAVACHOL) 20 MG tablet 30 tablet 5     Sig: Take 1 tablet by mouth Every Night.    estradiol (VAGIFEM) 10 MCG tablet vaginal tablet 8 tablet 3     Sig: INSERT 1 TABLET INTO THE VAGINA 2-3 TIMES WEEKLY      Last office visit with prescribing clinician: 4/14/2023   Last telemedicine visit with prescribing clinician: Visit date not found   Next office visit with prescribing clinician: 2/8/2024                         Would you like a call back once the refill request has been completed: [] Yes [] No    If the office needs to give you a call back, can they leave a voicemail: [] Yes [] No    Dalila Trevino LPN  02/08/24, 11:14 EST

## 2024-03-06 ENCOUNTER — LAB (OUTPATIENT)
Dept: LAB | Facility: HOSPITAL | Age: 69
End: 2024-03-06
Payer: MEDICARE

## 2024-03-06 DIAGNOSIS — E78.00 HYPERCHOLESTEROLEMIA: ICD-10-CM

## 2024-03-06 DIAGNOSIS — E55.9 VITAMIN D DEFICIENCY: ICD-10-CM

## 2024-03-06 LAB
25(OH)D3 SERPL-MCNC: 54.6 NG/ML (ref 30–100)
ALBUMIN SERPL-MCNC: 4.8 G/DL (ref 3.5–5.2)
ALBUMIN UR-MCNC: <1.2 MG/DL
ALBUMIN/GLOB SERPL: 2 G/DL
ALP SERPL-CCNC: 63 U/L (ref 39–117)
ALT SERPL W P-5'-P-CCNC: 17 U/L (ref 1–33)
ANION GAP SERPL CALCULATED.3IONS-SCNC: 11.3 MMOL/L (ref 5–15)
AST SERPL-CCNC: 23 U/L (ref 1–32)
BASOPHILS # BLD AUTO: 0.06 10*3/MM3 (ref 0–0.2)
BASOPHILS NFR BLD AUTO: 0.8 % (ref 0–1.5)
BILIRUB SERPL-MCNC: 0.5 MG/DL (ref 0–1.2)
BILIRUB UR QL STRIP: NEGATIVE
BUN SERPL-MCNC: 18 MG/DL (ref 8–23)
BUN/CREAT SERPL: 25 (ref 7–25)
CALCIUM SPEC-SCNC: 9.4 MG/DL (ref 8.6–10.5)
CHLORIDE SERPL-SCNC: 102 MMOL/L (ref 98–107)
CHOLEST SERPL-MCNC: 189 MG/DL (ref 0–200)
CLARITY UR: CLEAR
CO2 SERPL-SCNC: 24.7 MMOL/L (ref 22–29)
COLOR UR: YELLOW
CREAT SERPL-MCNC: 0.72 MG/DL (ref 0.57–1)
CREAT UR-MCNC: 38.4 MG/DL
DEPRECATED RDW RBC AUTO: 40.6 FL (ref 37–54)
EGFRCR SERPLBLD CKD-EPI 2021: 91.8 ML/MIN/1.73
EOSINOPHIL # BLD AUTO: 0.24 10*3/MM3 (ref 0–0.4)
EOSINOPHIL NFR BLD AUTO: 3 % (ref 0.3–6.2)
ERYTHROCYTE [DISTWIDTH] IN BLOOD BY AUTOMATED COUNT: 13 % (ref 12.3–15.4)
GLOBULIN UR ELPH-MCNC: 2.4 GM/DL
GLUCOSE SERPL-MCNC: 88 MG/DL (ref 65–99)
GLUCOSE UR STRIP-MCNC: NEGATIVE MG/DL
HCT VFR BLD AUTO: 42.2 % (ref 34–46.6)
HCYS SERPL-MCNC: 15.1 UMOL/L (ref 0–15)
HDLC SERPL-MCNC: 79 MG/DL (ref 40–60)
HGB BLD-MCNC: 14.1 G/DL (ref 12–15.9)
HGB UR QL STRIP.AUTO: NEGATIVE
HOLD SPECIMEN: NORMAL
IMM GRANULOCYTES # BLD AUTO: 0.03 10*3/MM3 (ref 0–0.05)
IMM GRANULOCYTES NFR BLD AUTO: 0.4 % (ref 0–0.5)
KETONES UR QL STRIP: NEGATIVE
LDLC SERPL CALC-MCNC: 98 MG/DL (ref 0–100)
LDLC/HDLC SERPL: 1.23 {RATIO}
LEUKOCYTE ESTERASE UR QL STRIP.AUTO: NEGATIVE
LYMPHOCYTES # BLD AUTO: 1.81 10*3/MM3 (ref 0.7–3.1)
LYMPHOCYTES NFR BLD AUTO: 22.7 % (ref 19.6–45.3)
MCH RBC QN AUTO: 29.1 PG (ref 26.6–33)
MCHC RBC AUTO-ENTMCNC: 33.4 G/DL (ref 31.5–35.7)
MCV RBC AUTO: 87.2 FL (ref 79–97)
MICROALBUMIN/CREAT UR: NORMAL MG/G{CREAT}
MONOCYTES # BLD AUTO: 0.64 10*3/MM3 (ref 0.1–0.9)
MONOCYTES NFR BLD AUTO: 8 % (ref 5–12)
NEUTROPHILS NFR BLD AUTO: 5.18 10*3/MM3 (ref 1.7–7)
NEUTROPHILS NFR BLD AUTO: 65.1 % (ref 42.7–76)
NITRITE UR QL STRIP: NEGATIVE
NRBC BLD AUTO-RTO: 0 /100 WBC (ref 0–0.2)
PH UR STRIP.AUTO: 6.5 [PH] (ref 5–8)
PLATELET # BLD AUTO: 218 10*3/MM3 (ref 140–450)
PMV BLD AUTO: 12.4 FL (ref 6–12)
POTASSIUM SERPL-SCNC: 4.2 MMOL/L (ref 3.5–5.2)
PROT SERPL-MCNC: 7.2 G/DL (ref 6–8.5)
PROT UR QL STRIP: NEGATIVE
RBC # BLD AUTO: 4.84 10*6/MM3 (ref 3.77–5.28)
SODIUM SERPL-SCNC: 138 MMOL/L (ref 136–145)
SP GR UR STRIP: 1.01 (ref 1–1.03)
TRIGL SERPL-MCNC: 66 MG/DL (ref 0–150)
TSH SERPL DL<=0.05 MIU/L-ACNC: 1.01 UIU/ML (ref 0.27–4.2)
UROBILINOGEN UR QL STRIP: NORMAL
VIT B12 BLD-MCNC: 620 PG/ML (ref 211–946)
VLDLC SERPL-MCNC: 12 MG/DL (ref 5–40)
WBC NRBC COR # BLD AUTO: 7.96 10*3/MM3 (ref 3.4–10.8)

## 2024-03-06 PROCEDURE — 81003 URINALYSIS AUTO W/O SCOPE: CPT

## 2024-03-06 PROCEDURE — 82607 VITAMIN B-12: CPT

## 2024-03-06 PROCEDURE — 85025 COMPLETE CBC W/AUTO DIFF WBC: CPT

## 2024-03-06 PROCEDURE — 82043 UR ALBUMIN QUANTITATIVE: CPT

## 2024-03-06 PROCEDURE — 82306 VITAMIN D 25 HYDROXY: CPT

## 2024-03-06 PROCEDURE — 84443 ASSAY THYROID STIM HORMONE: CPT

## 2024-03-06 PROCEDURE — 80053 COMPREHEN METABOLIC PANEL: CPT

## 2024-03-06 PROCEDURE — 80061 LIPID PANEL: CPT

## 2024-03-06 PROCEDURE — 82570 ASSAY OF URINE CREATININE: CPT

## 2024-03-06 PROCEDURE — 83090 ASSAY OF HOMOCYSTEINE: CPT

## 2024-03-11 ENCOUNTER — OFFICE VISIT (OUTPATIENT)
Dept: INTERNAL MEDICINE | Facility: CLINIC | Age: 69
End: 2024-03-11
Payer: MEDICARE

## 2024-03-11 VITALS
DIASTOLIC BLOOD PRESSURE: 66 MMHG | OXYGEN SATURATION: 98 % | HEIGHT: 60 IN | WEIGHT: 127.6 LBS | HEART RATE: 86 BPM | TEMPERATURE: 97.8 F | BODY MASS INDEX: 25.05 KG/M2 | SYSTOLIC BLOOD PRESSURE: 128 MMHG

## 2024-03-11 DIAGNOSIS — Z00.00 MEDICARE ANNUAL WELLNESS VISIT, SUBSEQUENT: Primary | ICD-10-CM

## 2024-03-11 DIAGNOSIS — M85.832 OSTEOPENIA OF LEFT FOREARM: Chronic | ICD-10-CM

## 2024-03-11 DIAGNOSIS — J30.1 SEASONAL ALLERGIC RHINITIS DUE TO POLLEN: Chronic | ICD-10-CM

## 2024-03-11 DIAGNOSIS — R79.83 HOMOCYSTEINEMIA: ICD-10-CM

## 2024-03-11 DIAGNOSIS — M25.512 ACUTE PAIN OF LEFT SHOULDER: Chronic | ICD-10-CM

## 2024-03-11 DIAGNOSIS — E55.9 VITAMIN D DEFICIENCY: ICD-10-CM

## 2024-03-11 DIAGNOSIS — Z00.00 ANNUAL PHYSICAL EXAM: ICD-10-CM

## 2024-03-11 DIAGNOSIS — E78.00 HYPERCHOLESTEROLEMIA: ICD-10-CM

## 2024-03-11 PROBLEM — U07.1 COVID-19 VIRUS INFECTION: Chronic | Status: RESOLVED | Noted: 2023-01-09 | Resolved: 2024-03-11

## 2024-03-11 RX ORDER — FLUTICASONE PROPIONATE 50 MCG
2 SPRAY, SUSPENSION (ML) NASAL DAILY PRN
COMMUNITY

## 2024-03-11 RX ORDER — CETIRIZINE HYDROCHLORIDE 10 MG/1
10 TABLET ORAL DAILY PRN
COMMUNITY

## 2024-03-11 NOTE — PROGRESS NOTES
The ABCs of the Annual Wellness Visit  Initial Medicare Wellness Visit    Chief Complaint   Patient presents with    Medicare Wellness-subsequent    Hyperlipidemia       Subjective   History of Present Illness:  Gricelda Matos is a 68 y.o. female who presents for an Initial Medicare Wellness Visit.    CHRONIC CONDITIONS:    Allergies  The patient started experiencing allergies earlier this week. She has a significant amount of sneezing and postnasal drainage. Her throat is bothering her currently. She is taking Zyrtec and Flonase, which have helped significantly. She tried discontinuing both medications for a couple of days; however, she had to restart them. She denies any side effects. Patient initially tried Mucinex, but it did not provide relief.     Elevated blood pressure  Blood pressure is 128/66 mmHg in office today. Systolic usually 102 to 110 mmHg at home. She has been consuming more sodium in her diet lately. She noticed her eyes are puffy which could be related to this.     Cholesterol  The patient is taking pravastatin every evening. She denies any side effects. LDL is 98 mg/dL, it was 96 mg/dL in 2023, and 122 mg/dL in 2022. HDL is 79 mg/dL She exercises daily and is eating a low-fat diet.     Alcohol use  She has started using an percy called [employer], which helps reduce alcohol intake. She has decreased her alcohol consumption in half, now drinking 7 glasses of wine weekly and abstaining from alcohol 3 days a week. Prior to this, she was drinking every day.     Left shoulder pain  The patient saw Tamiko Sawyer after injuring her left shoulder doing yoga. She went to [facility] for approximately 1 month and her shoulder issue resolved. She strongly recommends them. She tried dry needling, which was beneficial.    Acid reflux  She has reduced her Prilosec to every other day. She would like to decrease it slightly more. Patient increased her dosage back to daily for 1 week after having  breakthrough symptoms and then returned to every other day. Her goal is to avoid eating close to bedtime.     Insomnia  The patient is taking Dramamine twice a week for sleep. She sleeps approximately 8 hours when taking it. She usually takes it if waking up at 3:00 AM or 4:00 AM and unable to go back to sleep after 2 consecutive nights.     Eyelash thinning  Eyelashes lower left lid get thin over the last 6 years. She uses Latisse and they improve for awhile. But eventually get thin again then she uses latisse again. However, she has a reaction to the medication that causes pink skin under lateral eye corner     Health maintenance  Blood glucose on recent labs was 88 mg/dL. Kidney function and liver enzymes was good. No hematuria. CBC within normal limits other than MPV being 0.4 above normal which is not concerning. Thyroid function normal. Vitamin D was 54 ng/mL, it was 52 ng/mL in . B12 within normal limits but homocysteine slightly elevated at 16.2 µmol/L but stable as it was 14.9 µmol/L in . She is due for a bone density scan in 2024. She had a bone density scan in 2022 showing slight osteopenia at -1.2. They did not check lumbar spine. DEXA 2018 was on the femoral neck which was -1.1. Her last mammogram was completed in 2023.     HEALTH RISK ASSESSMENT    Recent Hospitalizations:  She was not admitted to the hospital during the last year.       Current Medical Providers:  Patient Care Team:  Agata Miner MD as PCP - General  Agata Miner MD as PCP - Family Medicine    Smoking Status:  Social History     Tobacco Use   Smoking Status Former    Current packs/day: 0.00    Average packs/day: 0.5 packs/day for 4.0 years (2.0 ttl pk-yrs)    Types: Cigarettes    Start date: 1972    Quit date: 1976    Years since quittin.2   Smokeless Tobacco Never   Tobacco Comments    Quit        Alcohol Consumption:  Social History     Substance and Sexual Activity   Alcohol Use Yes     Alcohol/week: 10.0 standard drinks of alcohol    Types: 10 Glasses of wine per week    Comment: daILY       Depression Screen:       3/11/2024    11:45 AM   PHQ-2/PHQ-9 Depression Screening   Little Interest or Pleasure in Doing Things 0-->not at all   Feeling Down, Depressed or Hopeless 0-->not at all   PHQ-9: Brief Depression Severity Measure Score 0       Fall Risk Screen:  LIBAN Fall Risk Assessment was completed, and patient is at LOW risk for falls.Assessment completed on:3/11/2024    Health Habits and Functional and Cognitive Screening:      3/11/2024    11:46 AM   Functional & Cognitive Status   Do you have difficulty preparing food and eating? No   Do you have difficulty bathing yourself, getting dressed or grooming yourself? No   Do you have difficulty using the toilet? No   Do you have difficulty moving around from place to place? No   Do you have trouble with steps or getting out of a bed or a chair? No   Current Diet Well Balanced Diet   Dental Exam Up to date   Eye Exam Up to date   Exercise (times per week) 7 times per week   Current Exercises Include Pilates;Weightlifting;Other;Walking        Exercise Comment Yoga   Do you need help using the phone?  No   Are you deaf or do you have serious difficulty hearing?  No   Do you need help to go to places out of walking distance? No   Do you need help shopping? No   Do you need help preparing meals?  No   Do you need help with housework?  No   Do you need help with laundry? No   Do you need help taking your medications? No   Do you need help managing money? No   Do you ever drive or ride in a car without wearing a seat belt? No   Have you felt unusual stress, anger or loneliness in the last month? No   Who do you live with? Spouse   If you need help, do you have trouble finding someone available to you? No   Have you been bothered in the last four weeks by sexual problems? No   Do you have difficulty concentrating, remembering or making decisions? No          Age-appropriate Screening Schedule:  Refer to the list below for future screening recommendations based on patient's age, sex and/or medical conditions. Orders for these recommended tests are listed in the plan section. The patient has been provided with a written plan.    Health Maintenance   Topic Date Due    HEPATITIS C SCREENING  Never done    PAP SMEAR  05/18/2022    INFLUENZA VACCINE  08/01/2023    Pneumococcal Vaccine 65+ (2 of 2 - PCV) 03/12/2025 (Originally 9/21/2021)    DXA SCAN  04/12/2024    LIPID PANEL  03/06/2025    ANNUAL WELLNESS VISIT  03/11/2025    BMI FOLLOWUP  03/11/2025    MAMMOGRAM  05/26/2025    COLORECTAL CANCER SCREENING  02/03/2027    TDAP/TD VACCINES (3 - Td or Tdap) 10/08/2028    COVID-19 Vaccine  Completed    RSV Vaccine - Adults  Completed    ZOSTER VACCINE  Completed        The following portions of the patient's history were reviewed and updated as appropriate: allergies, current medications, past family history, past medical history, past social history, past surgical history, and problem list.    Does the patient have evidence of cognitive impairment? No    Aspirin is not on active medication list.  Aspirin use is not indicated based on review of current medical condition/s. Risk of harm outweighs potential benefits.  .    Outpatient Medications Prior to Visit   Medication Sig Dispense Refill    Calcium Carbonate-Vitamin D (CALTRATE 600+D PO) Take 1,500 mg by mouth Daily.      cetirizine (zyrTEC) 10 MG tablet Take 1 tablet by mouth Daily As Needed for Allergies.      cyclobenzaprine (FLEXERIL) 10 MG tablet Take 1 tablet by mouth 3 (Three) Times a Day As Needed for Muscle Spasms. 30 tablet 0    dimenhyDRINATE (DRAMAMINE PO) Take 1 tablet by mouth At Night As Needed.      estradiol (VAGIFEM) 10 MCG tablet vaginal tablet INSERT 1 TABLET INTO THE VAGINA 2-3 TIMES WEEKLY 15 tablet 3    fluticasone (FLONASE) 50 MCG/ACT nasal spray 2 sprays into the nostril(s) as directed by  provider Daily As Needed for Rhinitis.      hyoscyamine (LEVSIN) 0.125 MG SL tablet Place 1 tablet under the tongue Daily.      l-methylfolate 15 MG tablet tablet Take 1 tablet by mouth Daily. 90 tablet 3    melatonin 5 MG tablet tablet Take 1 tablet by mouth At Night As Needed.      mupirocin (BACTROBAN) 2 % ointment Apply 1 application topically to the appropriate area as directed 2 (Two) Times a Day As Needed (irritation). 22 g 0    omeprazole (priLOSEC) 20 MG capsule Take 1 capsule by mouth Daily. (Patient taking differently: Take 1 capsule by mouth Every Other Day.) 90 capsule 3    phenazopyridine (Pyridium) 200 MG tablet Take 1 tablet by mouth 3 (Three) Times a Day As Needed for Bladder Spasms. 9 tablet 0    polyethylene glycol (MIRALAX) 17 GM/SCOOP powder Take 17 g by mouth Daily.      pravastatin (PRAVACHOL) 20 MG tablet Take 1 tablet by mouth Every Night. 90 tablet 3    Psyllium (KONSYL DAILY FIBER PO) Take  by mouth Daily.      zolpidem (AMBIEN) 5 MG tablet Take 1 tablet by mouth At Night As Needed for Sleep. 10 tablet 0     No facility-administered medications prior to visit.       Patient Active Problem List   Diagnosis    Homocysteinemia    GERD (gastroesophageal reflux disease)    Burning mouth syndrome    Vitamin D deficiency    Cervicalgia    Hypercholesterolemia    Constipation by delayed colonic transit    Benign meningioma of brain    Leg pain, right    Seasonal allergic rhinitis due to pollen    Bilateral hip pain    Folate deficiency    Former smoker    Insomnia    Menopause    Ovarian cyst    Hip pain, left    Pap test, as part of routine gynecological examination    Atrophic vaginitis    Annual physical exam    Pain of right upper arm    Upper back pain on right side    Neck fullness    Urinary urgency    Seborrheic dermatitis of scalp    Medicare annual wellness visit, subsequent    Acute right-sided low back pain without sciatica    Osteopenia of left forearm    Lightheaded    Fluctuating  "blood pressure    Acute pain of left shoulder       Advanced Care Planning:  Advance Directive is on file.  ACP discussion was held with the patient during this visit. Patient has an advance directive in EMR which is still valid.     Review of Systems    Compared to one year ago, the patient feels her physical health is the same.  Compared to one year ago, the patient feels her mental health is the same.    Reviewed chart for potential of high risk medication in the elderly: yes  Reviewed chart for potential of harmful drug interactions in the elderly:yes    Objective         Vitals:    03/11/24 1144   BP: 128/66   BP Location: Left arm   Patient Position: Sitting   Cuff Size: Adult   Pulse: 86   Temp: 97.8 °F (36.6 °C)   TempSrc: Infrared   SpO2: 98%   Weight: 57.9 kg (127 lb 9.6 oz)   Height: 151.7 cm (59.72\")   PainSc: 0-No pain     BMI is >= 25 and <30. (Overweight) The following options were offered after discussion;: exercise counseling/recommendations and nutrition counseling/recommendations    Body mass index is 25.15 kg/m².  Discussed the patient's BMI with her. The BMI is in the acceptable range.    Physical Exam  Vitals and nursing note reviewed.   Constitutional:       Appearance: She is well-developed.   HENT:      Head: Normocephalic.   Eyes:      Conjunctiva/sclera: Conjunctivae normal.      Pupils: Pupils are equal, round, and reactive to light.   Neck:      Thyroid: No thyromegaly.   Cardiovascular:      Rate and Rhythm: Normal rate and regular rhythm.      Heart sounds: Normal heart sounds.   Pulmonary:      Effort: Pulmonary effort is normal.      Breath sounds: Normal breath sounds. No wheezing.   Chest:   Breasts:     Right: No inverted nipple, mass, nipple discharge, skin change or tenderness.      Left: No inverted nipple, mass, nipple discharge, skin change or tenderness.   Abdominal:      General: Bowel sounds are normal.      Palpations: Abdomen is soft.      Tenderness: There is no " abdominal tenderness.   Musculoskeletal:         General: No tenderness. Normal range of motion.      Cervical back: Normal range of motion and neck supple.   Lymphadenopathy:      Cervical: No cervical adenopathy.   Skin:     General: Skin is warm and dry.      Findings: No rash.   Neurological:      Mental Status: She is alert and oriented to person, place, and time.      Cranial Nerves: No cranial nerve deficit.      Sensory: No sensory deficit.      Coordination: Coordination normal.      Gait: Gait normal.   Psychiatric:         Attention and Perception: Attention normal.         Mood and Affect: Mood normal.         Speech: Speech normal.         Behavior: Behavior normal.         Thought Content: Thought content normal.         Judgment: Judgment normal.           ECG 12 Lead    Date/Time: 3/15/2024 5:04 PM  Performed by: Agata Miner MD    Authorized by: gAata Miner MD  Comparison: compared with previous ECG   Similar to previous ECG  Rhythm: sinus rhythm  Rate: normal  BPM: 77  Conduction: conduction normal  ST Segments: ST segments normal  T Waves: T waves normal  QRS axis: normal    Clinical impression: normal ECG          Lab Results   Component Value Date    TRIG 66 03/06/2024    HDL 79 (H) 03/06/2024    LDL 98 03/06/2024    VLDL 12 03/06/2024        Assessment & Plan   Medicare Risks and Personalized Health Plan  CMS Preventative Services Quick Reference  Cardiovascular risk    The above risks/problems have been discussed with the patient.  Pertinent information has been shared with the patient in the After Visit Summary.  Follow up plans and orders are seen below in the Assessment/Plan Section.  Patient Instructions   Problem List Items Addressed This Visit          Allergies and Adverse Reactions    Seasonal allergic rhinitis due to pollen (Chronic)    Current Assessment & Plan     Continue cetirizine and fluticasone.            Cardiac and Vasculature    Hypercholesterolemia    Overview      Taking pravastatin.         Current Assessment & Plan     Continue pravastatin. Continue low fat diet and regular exercise.         Relevant Medications    pravastatin (PRAVACHOL) 20 MG tablet    Other Relevant Orders    CBC & Differential (Completed)    Comprehensive Metabolic Panel (Completed)    Lipid Panel (Completed)    Microalbumin / Creatinine Urine Ratio - Urine, Clean Catch (Completed)    Urinalysis With Culture If Indicated - (Completed)    TSH (Completed)    Vitamin B12 (Completed)    Homocysteine (Completed)       Endocrine and Metabolic    Homocysteinemia    Overview     Taking l-methylfolate daily.         Current Assessment & Plan     Continue L-methylfolate daily.         Vitamin D deficiency    Overview     Continue vitamin D3.         Relevant Orders    Vitamin D,25-Hydroxy (Completed)       Health Encounters    Annual physical exam    Current Assessment & Plan     Immunizations and mammogram up to date.         Medicare annual wellness visit, subsequent - Primary    Current Assessment & Plan     Immunizations and mammogram up to date.            Musculoskeletal and Injuries    Osteopenia of left forearm (Chronic)    Overview     DEXA 4/2022 showed the lowest T score in the osteopenia range in the left forearm at -1.2.  (Osteopenia is -1 or less.  Osteoporosis is -2.5 or less.)  Continue weight bearing exercises including walking and using small handweights and classes such as pilates or Aron chi.         Acute pain of left shoulder (Chronic)    Overview     Improved with PT from [facility] in [place].         Current Assessment & Plan     Continue exercises from PT.             Follow Up:  Next Medicare Wellness visit to be scheduled in 1 year.    Return in about 6 months (around 9/11/2024) for recheck fasting.    An After Visit Summary and PPPS were given to the patient.            Follow Up   Return in about 6 months (around 9/11/2024) for recheck fasting.  Patient was given  instructions and counseling regarding her condition or for health maintenance advice. Please see specific information pulled into the AVS if appropriate.     Transcribed from ambient dictation for Agata Miner MD by Charlee Rizvi..  03/11/24   19:51 EDT    Patient or patient representative verbalized consent to the visit recording.  I have personally performed the services described in this document as transcribed by the above individual, and it is both accurate and complete.

## 2024-03-13 NOTE — PATIENT INSTRUCTIONS
Patient Instructions  Problem List Items Addressed This Visit          Allergies and Adverse Reactions    Seasonal allergic rhinitis due to pollen (Chronic)    Current Assessment & Plan     Continue cetirizine and fluticasone.            Cardiac and Vasculature    Hypercholesterolemia    Overview     Taking pravastatin.         Current Assessment & Plan     Continue pravastatin. Continue low fat diet and regular exercise.         Relevant Medications    pravastatin (PRAVACHOL) 20 MG tablet    Other Relevant Orders    CBC & Differential (Completed)    Comprehensive Metabolic Panel (Completed)    Lipid Panel (Completed)    Microalbumin / Creatinine Urine Ratio - Urine, Clean Catch (Completed)    Urinalysis With Culture If Indicated - (Completed)    TSH (Completed)    Vitamin B12 (Completed)    Homocysteine (Completed)       Endocrine and Metabolic    Homocysteinemia    Overview     Taking l-methylfolate daily.         Current Assessment & Plan     Continue L-methylfolate daily.         Vitamin D deficiency    Overview     Continue vitamin D3.         Relevant Orders    Vitamin D,25-Hydroxy (Completed)       Health Encounters    Annual physical exam    Current Assessment & Plan     Immunizations and mammogram up to date.         Medicare annual wellness visit, subsequent - Primary    Current Assessment & Plan     Immunizations and mammogram up to date.            Musculoskeletal and Injuries    Osteopenia of left forearm (Chronic)    Overview     DEXA 4/2022 showed the lowest T score in the osteopenia range in the left forearm at -1.2.  (Osteopenia is -1 or less.  Osteoporosis is -2.5 or less.)  Continue weight bearing exercises including walking and using small handweights and classes such as pilates or Aron chi.         Acute pain of left shoulder (Chronic)    Overview     Improved with PT from [facility] [place].         Current Assessment & Plan     Continue exercises from PT.           Exercising  to Stay Healthy  To become healthy and stay healthy, it is recommended that you do moderate-intensity and vigorous-intensity exercise. You can tell that you are exercising at a moderate intensity if your heart starts beating faster and you start breathing faster but can still hold a conversation. You can tell that you are exercising at a vigorous intensity if you are breathing much harder and faster and cannot hold a conversation while exercising.  How can exercise benefit me?  Exercising regularly is important. It has many health benefits, such as:  Improving overall fitness, flexibility, and endurance.  Increasing bone density.  Helping with weight control.  Decreasing body fat.  Increasing muscle strength and endurance.  Reducing stress and tension, anxiety, depression, or anger.  Improving overall health.  What guidelines should I follow while exercising?  Before you start a new exercise program, talk with your health care provider.  Do not exercise so much that you hurt yourself, feel dizzy, or get very short of breath.  Wear comfortable clothes and wear shoes with good support.  Drink plenty of water while you exercise to prevent dehydration or heat stroke.  Work out until your breathing and your heartbeat get faster (moderate intensity).  How often should I exercise?  Choose an activity that you enjoy, and set realistic goals. Your health care provider can help you make an activity plan that is individually designed and works best for you.  Exercise regularly as told by your health care provider. This may include:  Doing strength training two times a week, such as:  Lifting weights.  Using resistance bands.  Push-ups.  Sit-ups.  Yoga.  Doing a certain intensity of exercise for a given amount of time. Choose from these options:  A total of 150 minutes of moderate-intensity exercise every week.  A total of 75 minutes of vigorous-intensity exercise every week.  A mix of moderate-intensity and vigorous-intensity  exercise every week.  Children, pregnant women, people who have not exercised regularly, people who are overweight, and older adults may need to talk with a health care provider about what activities are safe to perform. If you have a medical condition, be sure to talk with your health care provider before you start a new exercise program.  What are some exercise ideas?  Moderate-intensity exercise ideas include:  Walking 1 mile (1.6 km) in about 15 minutes.  Biking.  Hiking.  Golfing.  Dancing.  Water aerobics.  Vigorous-intensity exercise ideas include:  Walking 4.5 miles (7.2 km) or more in about 1 hour.  Jogging or running 5 miles (8 km) in about 1 hour.  Biking 10 miles (16.1 km) or more in about 1 hour.  Lap swimming.  Roller-skating or in-line skating.  Cross-country skiing.  Vigorous competitive sports, such as football, basketball, and soccer.  Jumping rope.  Aerobic dancing.  What are some everyday activities that can help me get exercise?  Yard work, such as:  Pushing a .  Raking and bagging leaves.  Washing your car.  Pushing a stroller.  Shoveling snow.  Gardening.  Washing windows or floors.  How can I be more active in my day-to-day activities?  Use stairs instead of an elevator.  Take a walk during your lunch break.  If you drive, park your car farther away from your work or school.  If you take public transportation, get off one stop early and walk the rest of the way.  Stand up or walk around during all of your indoor phone calls.  Get up, stretch, and walk around every 30 minutes throughout the day.  Enjoy exercise with a friend. Support to continue exercising will help you keep a regular routine of activity.  Where to find more information  You can find more information about exercising to stay healthy from:  U.S. Department of Health and Human Services: www.hhs.gov  Centers for Disease Control and Prevention (CDC): www.cdc.gov  Summary  Exercising regularly is important. It will  "improve your overall fitness, flexibility, and endurance.  Regular exercise will also improve your overall health. It can help you control your weight, reduce stress, and improve your bone density.  Do not exercise so much that you hurt yourself, feel dizzy, or get very short of breath.  Before you start a new exercise program, talk with your health care provider.  This information is not intended to replace advice given to you by your health care provider. Make sure you discuss any questions you have with your health care provider.  Document Revised: 04/15/2022 Document Reviewed: 04/15/2022  [employer] Patient Education © 2023 [employer] BMI for Adults  Body mass index (BMI) is a number found using a person's weight and height. BMI can help tell how much of a person's weight is made up of fat. BMI does not measure body fat directly. It is used instead of tests that directly measure body fat, which can be difficult and expensive.  What are BMI measurements used for?  BMI is useful to:  Find out if your weight puts you at higher risk for medical problems.  Help recommend changes, such as in diet and exercise. This can help you reach a healthy weight. BMI screening can be done again to see if these changes are working.  How is BMI calculated?  Your height and weight are measured. The BMI is found from those numbers. This can be done with U.S[place] or metric measurements. Note that charts and online BMI calculators are available to help you find your BMI quickly and easily without doing these calculations.  To calculate your BMI in U.S[place] measurements:  Measure your weight in pounds (lb).  Multiply the number of pounds by 703.  So, for an adult who weighs 150 lb, multiply that number by 703: 150 x 703, which equals 105,450.  Measure your height in inches. Then multiply that number by itself to get a measurement called \"inches squared.\"  So, for an adult who is 70 inches tall, the \"inches squared\" measurement is 70 inches x " "70 inches, which equals 4,900 inches squared.  Divide the total from step 2 (number of lb x 703) by the total from step 3 (inches squared): 105,450 ÷ 4,900 = 21.5. This is your BMI.  To calculate your BMI in metric measurements:    Measure your weight in kilograms (kg).  For this example, the weight is 70 kg.  Measure your height in meters (m). Then multiply that number by itself to get a measurement called \"meters squared.\"  So, for an adult who is 1.75 m tall, the \"meters squared\" measurement is 1.75 m x 1.75 m, which equals 3.1 meters squared.  Divide the number of kilograms (your weight) by the meters squared number. In this example: 70 ÷ 3.1 = 22.6. This is your BMI.  What do the results mean?  BMI charts are used to see if you are underweight, normal weight, overweight, or obese. The following guidelines will be used:  Underweight: BMI less than 18.5.  Normal weight: BMI between 18.5 and 24.9.  Overweight: BMI between 25 and 29.9.  Obese: BMI of 30 or above.  BMI is a tool and cannot diagnose a condition. Talk with your health care provider about what your BMI means for you. Keep these notes in mind:  Weight includes fat and muscle. Someone with a muscular build, such as an athlete, may have a BMI that is higher than 24.9. In cases like these, BMI is not a correct measure of body fat.  If you have a BMI of 25 or higher, your provider may need to do more testing to find out if excess body fat is the cause.  BMI is measured the same way for males and females. Females usually have more body fat than males of the same height and weight.  Where to find more information  For more information about BMI, including tools to quickly find your BMI, go to:  Centers for Disease Control and Prevention: cdc.gov  American Heart Association: heart.org  National Heart, Lung, and Blood [place]: nhlbi.nih.gov  This information is not intended to replace advice given to you by your health care provider. Make sure you discuss any " questions you have with your health care provider.  Document Revised: 09/07/2023 Document Reviewed: 08/31/2023  Elsevier Patient Education © 2023 [employer]

## 2024-05-01 ENCOUNTER — OFFICE VISIT (OUTPATIENT)
Dept: INTERNAL MEDICINE | Facility: CLINIC | Age: 69
End: 2024-05-01
Payer: MEDICARE

## 2024-05-01 VITALS
WEIGHT: 127 LBS | OXYGEN SATURATION: 97 % | BODY MASS INDEX: 24.94 KG/M2 | HEIGHT: 60 IN | SYSTOLIC BLOOD PRESSURE: 108 MMHG | DIASTOLIC BLOOD PRESSURE: 54 MMHG | TEMPERATURE: 97.7 F | HEART RATE: 74 BPM

## 2024-05-01 DIAGNOSIS — F41.8 SITUATIONAL ANXIETY: Primary | Chronic | ICD-10-CM

## 2024-05-01 DIAGNOSIS — F51.02 ADJUSTMENT INSOMNIA: Chronic | ICD-10-CM

## 2024-05-01 DIAGNOSIS — E78.00 HYPERCHOLESTEROLEMIA: ICD-10-CM

## 2024-05-01 PROCEDURE — 1159F MED LIST DOCD IN RCRD: CPT | Performed by: INTERNAL MEDICINE

## 2024-05-01 PROCEDURE — 1160F RVW MEDS BY RX/DR IN RCRD: CPT | Performed by: INTERNAL MEDICINE

## 2024-05-01 PROCEDURE — 99214 OFFICE O/P EST MOD 30 MIN: CPT | Performed by: INTERNAL MEDICINE

## 2024-05-01 NOTE — PROGRESS NOTES
[facility]     Gricelda Matos  1955   [id number]      Patient Care Team:  Agata Miner MD as PCP - General  Agata Miner MD as PCP - Family Medicine    Chief Complaint   Patient presents with    Anxiety        Patient is a 68 y.o. female.   History of Present Illness  The patient presents for anxiety and stress.    Patient is experiencing a lot of stress since her daughter is in an unsuccessful marriage with a 2-1/2-year-old baby.  The worry is keeping her awake at night.  She is constantly feeling stressed about the situation even though she knows her daughter has to make her own decisions.  She was on sertraline a few years ago when her mother passed away and she was under a lot of stress.  She would like to take it again to help with her current feelings.  She does not feel depressed.  She enjoys her life.  Sertraline worked well for her in the past.  She has seen a family counselor in the past and wishes to call that person to make an appointment for follow-up.         CHRONIC CONDITIONS      Past Medical History:   Diagnosis Date    Abdominal pain 2013    Dr. Garvin felt from too much raw vegetables    Arthritis     Atypical squamous cells of undetermined significance (ASCUS) on Papanicolaou smear of cervix 2004    Brain hemangioma 2014    benign-(incidental finding on MRI)    Burning tongue syndrome 2015    Cough     COVID-19 virus infection 01/09/2023    Elevated homocysteine     MTHFR pos for 2 copies of C7020P mutation,neg for C677T mutation    Fungal toenail infection 04/29/2019 2/4/2020 Agata Miner MD  Continue fluconazole weekly per Dr. Romero.  Follow-up with him as planned.    Glossitis 04/29/2019    Heart murmur     No murmur noted for years.    Hormone deficiency 2013    Dr. Rod did hormone testing-she took HRT for about 6 months    Irritable bowel syndrome 2021    It’s under control    Mitral valve prolapse     Diagnosed when she was younger.   Asymptomatic.  Stress echo done by Dr.Avichai Liang in  was normal.    Nocturnal cough 2019    OA (osteoarthritis) 2016    severe OA of R hip (L hip mild)    Open wound of finger with complication 2019    Ovarian cyst     Skin cancer     basal cell skin cancers on back    Spine disorder     Lumbar spine degenerative changes/mild stenosis/moderate facet disease    Zinc deficiency 2019       Past Surgical History:   Procedure Laterality Date     SECTION      x3; 1982, , &     COLONOSCOPY  2012    COLPOSCOPY  2004    by Dr. Bustos    ENDOSCOPY      colonoscopy okay     HIP ARTHROPLASTY Right 2016    JOINT REPLACEMENT  ,    Doing fine    TONSILLECTOMY  89       Family History   Problem Relation Age of Onset    Hypertension Mother     Alcohol abuse Mother     Hypertension Father     Cancer Father         Colon, Bladder    Colon polyps Father     Alcohol abuse Father     Arthritis Father         Hip replacements    Arthritis Sister         Hip replacement    Arthritis Brother         Hip replacements    Lymphoma Brother     Colon cancer Maternal Grandmother     Hypertension Maternal Grandmother     Coronary artery disease Maternal Grandfather 65         of MI at 65    Breast cancer Paternal Grandmother     Other Daughter 28        MTH-FR gene mutation       Social History     Socioeconomic History    Marital status:    Tobacco Use    Smoking status: Former     Current packs/day: 0.00     Average packs/day: 0.5 packs/day for 4.0 years (2.0 ttl pk-yrs)     Types: Cigarettes     Start date: 1972     Quit date: 1976     Years since quittin.3    Smokeless tobacco: Never    Tobacco comments:     Quit    Substance and Sexual Activity    Alcohol use: Yes     Alcohol/week: 10.0 standard drinks of alcohol     Types: 10 Glasses of wine per week     Comment: daILY    Drug use: No    Sexual activity: Yes     Partners: Male     Birth  "control/protection: Same-sex partner       Allergies   Allergen Reactions    Contrast Dye (Echo Or Unknown Ct/Mr) Hives    Naproxen Sodium Other (See Comments)     Excessive bruising   Aleive        Review of Systems:     Review of Systems    Vital Signs  Vitals:    05/01/24 1357   BP: 108/54   BP Location: Left arm   Patient Position: Sitting   Cuff Size: Adult   Pulse: 74   Temp: 97.7 °F (36.5 °C)   TempSrc: Infrared   SpO2: 97%   Weight: 57.6 kg (127 lb)   Height: 151.7 cm (59.72\")     Body mass index is 25.03 kg/m².  BMI is >= 25 and <30. (Overweight) The following options were offered after discussion;: exercise counseling/recommendations          Current Outpatient Medications:     Calcium Carbonate-Vitamin D (CALTRATE 600+D PO), Take 1,500 mg by mouth Daily., Disp: , Rfl:     cetirizine (zyrTEC) 10 MG tablet, Take 1 tablet by mouth Daily As Needed for Allergies., Disp: , Rfl:     dimenhyDRINATE (DRAMAMINE PO), Take 1 tablet by mouth At Night As Needed., Disp: , Rfl:     estradiol (VAGIFEM) 10 MCG tablet vaginal tablet, INSERT 1 TABLET INTO THE VAGINA 2-3 TIMES WEEKLY, Disp: 15 tablet, Rfl: 3    fluticasone (FLONASE) 50 MCG/ACT nasal spray, 2 sprays into the nostril(s) as directed by provider Daily As Needed for Rhinitis., Disp: , Rfl:     hyoscyamine (LEVSIN) 0.125 MG SL tablet, Place 1 tablet under the tongue Daily As Needed., Disp: , Rfl:     l-methylfolate 15 MG tablet tablet, Take 1 tablet by mouth Daily., Disp: 90 tablet, Rfl: 3    melatonin 5 MG tablet tablet, Take 1 tablet by mouth At Night As Needed., Disp: , Rfl:     mupirocin (BACTROBAN) 2 % ointment, Apply 1 application topically to the appropriate area as directed 2 (Two) Times a Day As Needed (irritation)., Disp: 22 g, Rfl: 0    omeprazole (priLOSEC) 20 MG capsule, Take 1 capsule by mouth Daily. (Patient taking differently: Take 1 capsule by mouth Every Other Day.), Disp: 90 capsule, Rfl: 3    phenazopyridine (Pyridium) 200 MG tablet, Take 1 " tablet by mouth 3 (Three) Times a Day As Needed for Bladder Spasms., Disp: 9 tablet, Rfl: 0    polyethylene glycol (MIRALAX) 17 GM/SCOOP powder, Take 17 g by mouth Daily., Disp: , Rfl:     pravastatin (PRAVACHOL) 20 MG tablet, Take 1 tablet by mouth Every Night., Disp: 90 tablet, Rfl: 3    Psyllium (KONSYL DAILY FIBER PO), Take  by mouth Daily., Disp: , Rfl:     zolpidem (AMBIEN) 5 MG tablet, Take 1 tablet by mouth At Night As Needed for Sleep., Disp: 10 tablet, Rfl: 0    cyclobenzaprine (FLEXERIL) 10 MG tablet, Take 1 tablet by mouth 3 (Three) Times a Day As Needed for Muscle Spasms. (Patient not taking: Reported on 5/1/2024), Disp: 30 tablet, Rfl: 0    sertraline (ZOLOFT) 50 MG tablet, Take 1 tablet by mouth Daily., Disp: 90 tablet, Rfl: 1    Physical Exam:    Physical Exam  Vitals and nursing note reviewed.   Constitutional:       Appearance: She is well-developed.   HENT:      Head: Normocephalic.   Eyes:      Conjunctiva/sclera: Conjunctivae normal.      Pupils: Pupils are equal, round, and reactive to light.   Neck:      Thyroid: No thyromegaly.   Cardiovascular:      Rate and Rhythm: Normal rate and regular rhythm.      Heart sounds: Normal heart sounds.   Pulmonary:      Effort: Pulmonary effort is normal.      Breath sounds: Normal breath sounds. No wheezing.   Musculoskeletal:         General: Normal range of motion.      Cervical back: Normal range of motion and neck supple.   Lymphadenopathy:      Cervical: No cervical adenopathy.   Skin:     General: Skin is warm and dry.   Neurological:      Mental Status: She is alert and oriented to person, place, and time.   Psychiatric:         Attention and Perception: Attention normal.         Mood and Affect: Mood normal.         Speech: Speech normal.         Thought Content: Thought content normal.         Judgment: Judgment normal.          ACE III MINI        Results Review:    I reviewed the patient's new clinical results.  Results         CMP:  Lab Results    Component Value Date    BUN 18 03/06/2024    CREATININE 0.72 03/06/2024    EGFRIFNONA 78 01/18/2022    BCR 25.0 03/06/2024     03/06/2024    K 4.2 03/06/2024    CO2 24.7 03/06/2024    CALCIUM 9.4 03/06/2024    ALBUMIN 4.8 03/06/2024    BILITOT 0.5 03/06/2024    ALKPHOS 63 03/06/2024    AST 23 03/06/2024    ALT 17 03/06/2024     HbA1c:  Lab Results   Component Value Date    HGBA1C 5.3 07/13/2022    HGBA1C 4.80 09/14/2020     Microalbumin:  Lab Results   Component Value Date    MICROALBUR <1.2 03/06/2024     Lipid Panel  Lab Results   Component Value Date    CHOL 189 03/06/2024    TRIG 66 03/06/2024    HDL 79 (H) 03/06/2024    LDL 98 03/06/2024    AST 23 03/06/2024    ALT 17 03/06/2024       Medication Review: Medications reviewed and noted  Patient Instructions   Problem List Items Addressed This Visit          Cardiac and Vasculature    Hypercholesterolemia    Overview     Taking pravastatin.         Relevant Medications    pravastatin (PRAVACHOL) 20 MG tablet       Mental Health    Situational anxiety - Primary (Chronic)       Sleep    Adjustment insomnia (Chronic)    Relevant Medications    zolpidem (AMBIEN) 5 MG tablet          Diagnosis Plan   1. Situational anxiety        2. Adjustment insomnia        3. Hypercholesterolemia          Assessment & Plan  1. Situational anxiety.  The patient will commence a daily regimen of sertraline 50 mg tablets. The initial dosage may be 0.5 tablet daily for the initial 4 to 6 days.. She will continue therapy sessions with her family therapist and maintain her regular exercise regimen.    2. Hypercholesterolemia.  The patient will continue her nightly regimen of pravastatin and maintain her regular exercise routine.    3. Adjustment insomnia.  The increased daytime exercise could potentially improve her sleep quality.  Continue good sleep hygiene including going to bed at the same time and getting up at the same time every day, going to bed early enough to get 7 or 8  hours in bed, reading and relaxing before bedtime, and avoiding the TV, computer, phone, iPad close to bedtime.  The patient may continue  0.5 to 1 tablet of Dramamine in the evenings as needed.    Follow-up  The patient is scheduled for a fasting follow-up appointment on 10/01/2024.         Plan of care reviewed with patient at the conclusion of today's visit. Education was provided regarding diagnosis, management, and any prescribed or recommended OTC medications. Patient verbalizes understanding of and agreement with management plan.         05/01/24   13:58 EDT    Patient or patient representative verbalized consent for the use of Ambient Listening during the visit with  Agata Miner MD for chart documentation. 5/2/2024  14:33 EDT

## 2024-05-02 NOTE — PATIENT INSTRUCTIONS
Patient Instructions  Problem List Items Addressed This Visit          Cardiac and Vasculature    Hypercholesterolemia    Overview     Taking pravastatin.         Relevant Medications    pravastatin (PRAVACHOL) 20 MG tablet       Mental Health    Situational anxiety - Primary (Chronic)       Sleep    Adjustment insomnia (Chronic)    Relevant Medications    zolpidem (AMBIEN) 5 MG tablet       Exercising to Stay Healthy  To become healthy and stay healthy, it is recommended that you do moderate-intensity and vigorous-intensity exercise. You can tell that you are exercising at a moderate intensity if your heart starts beating faster and you start breathing faster but can still hold a conversation. You can tell that you are exercising at a vigorous intensity if you are breathing much harder and faster and cannot hold a conversation while exercising.  How can exercise benefit me?  Exercising regularly is important. It has many health benefits, such as:  Improving overall fitness, flexibility, and endurance.  Increasing bone density.  Helping with weight control.  Decreasing body fat.  Increasing muscle strength and endurance.  Reducing stress and tension, anxiety, depression, or anger.  Improving overall health.  What guidelines should I follow while exercising?  Before you start a new exercise program, talk with your health care provider.  Do not exercise so much that you hurt yourself, feel dizzy, or get very short of breath.  Wear comfortable clothes and wear shoes with good support.  Drink plenty of water while you exercise to prevent dehydration or heat stroke.  Work out until your breathing and your heartbeat get faster (moderate intensity).  How often should I exercise?  Choose an activity that you enjoy, and set realistic goals. Your health care provider can help you make an activity plan that is individually designed and works best for you.  Exercise regularly as told by your health care provider. This may  include:  Doing strength training two times a week, such as:  Lifting weights.  Using resistance bands.  Push-ups.  Sit-ups.  Yoga.  Doing a certain intensity of exercise for a given amount of time. Choose from these options:  A total of 150 minutes of moderate-intensity exercise every week.  A total of 75 minutes of vigorous-intensity exercise every week.  A mix of moderate-intensity and vigorous-intensity exercise every week.  Children, pregnant women, people who have not exercised regularly, people who are overweight, and older adults may need to talk with a health care provider about what activities are safe to perform. If you have a medical condition, be sure to talk with your health care provider before you start a new exercise program.  What are some exercise ideas?  Moderate-intensity exercise ideas include:  Walking 1 mile (1.6 km) in about 15 minutes.  Biking.  Hiking.  Golfing.  Dancing.  Water aerobics.  Vigorous-intensity exercise ideas include:  Walking 4.5 miles (7.2 km) or more in about 1 hour.  Jogging or running 5 miles (8 km) in about 1 hour.  Biking 10 miles (16.1 km) or more in about 1 hour.  Lap swimming.  Roller-skating or in-line skating.  Cross-country skiing.  Vigorous competitive sports, such as football, basketball, and soccer.  Jumping rope.  Aerobic dancing.  What are some everyday activities that can help me get exercise?  Yard work, such as:  Pushing a .  Raking and bagging leaves.  Washing your car.  Pushing a stroller.  Shoveling snow.  Gardening.  Washing windows or floors.  How can I be more active in my day-to-day activities?  Use stairs instead of an elevator.  Take a walk during your lunch break.  If you drive, park your car farther away from your work or school.  If you take public transportation, get off one stop early and walk the rest of the way.  Stand up or walk around during all of your indoor phone calls.  Get up, stretch, and walk around every 30 minutes  throughout the day.  Enjoy exercise with a friend. Support to continue exercising will help you keep a regular routine of activity.  Where to find more information  You can find more information about exercising to stay healthy from:  U.S. Department of Health and Human Services: www.hhs.gov  Centers for Disease Control and Prevention (CDC): www.cdc.gov  Summary  Exercising regularly is important. It will improve your overall fitness, flexibility, and endurance.  Regular exercise will also improve your overall health. It can help you control your weight, reduce stress, and improve your bone density.  Do not exercise so much that you hurt yourself, feel dizzy, or get very short of breath.  Before you start a new exercise program, talk with your health care provider.  This information is not intended to replace advice given to you by your health care provider. Make sure you discuss any questions you have with your health care provider.  Document Revised: 04/15/2022 Document Reviewed: 04/15/2022  Elsevier Patient Education © 2023 [employer] Inc.

## 2024-05-14 ENCOUNTER — OFFICE (OUTPATIENT)
Dept: URBAN - METROPOLITAN AREA CLINIC 4 | Facility: CLINIC | Age: 69
End: 2024-05-14

## 2024-05-14 VITALS — WEIGHT: 124 LBS | SYSTOLIC BLOOD PRESSURE: 120 MMHG | DIASTOLIC BLOOD PRESSURE: 72 MMHG | HEIGHT: 59 IN

## 2024-05-14 DIAGNOSIS — R14.3 FLATULENCE: ICD-10-CM

## 2024-05-14 DIAGNOSIS — K59.00 CONSTIPATION, UNSPECIFIED: ICD-10-CM

## 2024-05-14 PROCEDURE — 99213 OFFICE O/P EST LOW 20 MIN: CPT | Performed by: NURSE PRACTITIONER

## 2024-06-04 ENCOUNTER — TELEPHONE (OUTPATIENT)
Dept: INTERNAL MEDICINE | Facility: CLINIC | Age: 69
End: 2024-06-04

## 2024-06-04 NOTE — TELEPHONE ENCOUNTER
Spoke with pt. Cancelled appt for tomorrow. She understood and verbalized understanding on the quarantine protocol.    She reports she saw her GI 5/14/24 and has been taking 1 capsule miralax twice daily and metamucil twice daily per her GI. She reports it hasn't helped her constipation much.    She states the constipation started about a month ago, around the same time she started sertraline and asked if she should titrate down to see if it improves her symptom?    Regarding the constipation she reports having small bowel movements about 4 times per day that is abnormal for her

## 2024-06-04 NOTE — TELEPHONE ENCOUNTER
Caller: Gricelda Matos    Relationship: Self    Best call back number: [phone]     What was the call regarding: PATIENT IS COVID POSITIVE ON A HOME TEST, SMALL COUGH AND RUNNY NOSE. PATIENT HAS AN APPOINTMENT TOMORROW AND WOULD LIKE TO KNOW IF SHE NEEDS TO RESCHEDULE. PLEASE ADVISE.     Is it okay if the provider responds through MyChart: YES, CALL PREFERRED.

## 2024-06-04 NOTE — TELEPHONE ENCOUNTER
Dr. Miner requested if you could please reschedule the pt for next week? She had to cancel her appt dated 6/5/24 and is on covid quarantine for 5 days

## 2024-06-05 NOTE — TELEPHONE ENCOUNTER
Talked to pt. Pt verbalized understanding about intake of more fluids.    Pt clarified she is taking one cap full each of Miralax and the konsyl powder once daily.

## 2024-06-10 ENCOUNTER — TELEPHONE (OUTPATIENT)
Dept: INTERNAL MEDICINE | Facility: CLINIC | Age: 69
End: 2024-06-10

## 2024-06-10 ENCOUNTER — OFFICE VISIT (OUTPATIENT)
Dept: INTERNAL MEDICINE | Facility: CLINIC | Age: 69
End: 2024-06-10
Payer: MEDICARE

## 2024-06-10 VITALS
HEART RATE: 80 BPM | BODY MASS INDEX: 24.35 KG/M2 | TEMPERATURE: 97.8 F | OXYGEN SATURATION: 98 % | DIASTOLIC BLOOD PRESSURE: 58 MMHG | HEIGHT: 60 IN | SYSTOLIC BLOOD PRESSURE: 122 MMHG | WEIGHT: 124 LBS

## 2024-06-10 DIAGNOSIS — K59.01 CONSTIPATION BY DELAYED COLONIC TRANSIT: Primary | ICD-10-CM

## 2024-06-10 DIAGNOSIS — F41.8 SITUATIONAL ANXIETY: Chronic | ICD-10-CM

## 2024-06-10 PROCEDURE — 1159F MED LIST DOCD IN RCRD: CPT | Performed by: INTERNAL MEDICINE

## 2024-06-10 PROCEDURE — 1160F RVW MEDS BY RX/DR IN RCRD: CPT | Performed by: INTERNAL MEDICINE

## 2024-06-10 PROCEDURE — 99213 OFFICE O/P EST LOW 20 MIN: CPT | Performed by: INTERNAL MEDICINE

## 2024-06-10 PROCEDURE — 1126F AMNT PAIN NOTED NONE PRSNT: CPT | Performed by: INTERNAL MEDICINE

## 2024-06-10 NOTE — TELEPHONE ENCOUNTER
"Called and left detailed voicemail advising the below, requesting a callback for follow-up to discuss verbally over the phone:    \"Created prior authorization for Linzess and it was approved through her insurance. I recommended she head to her pharmacy to check on the cost when she is able to\"  "

## 2024-06-10 NOTE — PROGRESS NOTES
[facility]     Gricelda Matos  1955   [id number]      Patient Care Team:  Agata Miner MD as PCP - General  Agata Miner MD as PCP - Family Medicine    Chief Complaint   Patient presents with    Constipation     Has improved over the past few days        Patient is a 68 y.o. female.   History of Present Illness  The patient is here for an office visit.    The patient has been experiencing constipation for the past 6 to 8 weeks, which the pharmacy did not approve her medication. She hypothesizes that the Dramamine she was taking on 05/01/2024 might be contributing to her lethargy, leading her to take the medication only once since her last visit. Despite not actively trying to lose weight, her weight has decreased from 127 pounds in 05/2024 to 124 pounds today. She attributes her weight loss to stress. Her bowel movements have significantly changed in the past 2 months, leading her to question if she has irritable bowel syndrome. She has been under the care of Dr. Webster and his PA, both annually. Her last consultation was in 05/2024, during which she was prescribed MiraLAX twice daily. However, this treatment did not yield any improvement until the last 5 days, when her bowel movements have been more normal. On one occasion, she did not have a bowel movement for 6 days, but experienced gas. She describes the need to defecate, passing small, skinny, small marbles. She denies experiencing any pain. She has been able to evacuate once daily over the past few days. She is currently taking Konsyl, psyllium, and MiraLAX, and reports no side effects. She has eliminated popcorn and corn from her diet. Her primary concern is the urgency, which occurs only during walks in her neighborhood. She takes hyoscyamine as needed, having taken it 2 to 3 times in the last 1.5 years.    Supplemental Information  Her COVID-19 symptoms were mild.   Her brother had non-Hodgkin's lymphoma about 2 or 3 years  ago and he lost 15 pounds. Her father had colon cancer. Her mother  from back of bowels.         CHRONIC CONDITIONS      Past Medical History:   Diagnosis Date    Abdominal pain     Dr. Garvin felt from too much raw vegetables    Arthritis     Atypical squamous cells of undetermined significance (ASCUS) on Papanicolaou smear of cervix     Brain hemangioma 2014    benign-(incidental finding on MRI)    Burning tongue syndrome 2015    Cough     COVID-19 virus infection 2023    Elevated homocysteine     MTHFR pos for 2 copies of [id number] mutation,neg for C677T mutation    Fungal toenail infection 2019 Agata Miner MD  Continue fluconazole weekly per Dr. Romero.  Follow-up with him as planned.    Glossitis 2019    Heart murmur     No murmur noted for years.    Hormone deficiency     Dr. Rod did hormone testing-she took HRT for about 6 months    Irritable bowel syndrome     It’s under control    Mitral valve prolapse     Diagnosed when she was younger.  Asymptomatic.  Stress echo done by Dr.Avichai Liang in  was normal.    Nocturnal cough 2019    OA (osteoarthritis) 2016    severe OA of R hip (L hip mild)    Open wound of finger with complication 2019    Ovarian cyst     Skin cancer     basal cell skin cancers on back    Spine disorder     Lumbar spine degenerative changes/mild stenosis/moderate facet disease    Zinc deficiency 2019       Past Surgical History:   Procedure Laterality Date     SECTION      x3; 1982, , &     COLONOSCOPY  2012    COLPOSCOPY  2004    by Dr. Bustos    ENDOSCOPY      colonoscopy okay     HIP ARTHROPLASTY Right 2016    JOINT REPLACEMENT  ,    Doing fine    TONSILLECTOMY  1961       Family History   Problem Relation Age of Onset    Hypertension Mother     Alcohol abuse Mother     Hypertension Father     Cancer Father         Colon, Bladder    Colon polyps Father     Alcohol abuse  "Father     Arthritis Father         Hip replacements    Arthritis Sister         Hip replacement    Arthritis Brother         Hip replacements    Lymphoma Brother     Colon cancer Maternal Grandmother     Hypertension Maternal Grandmother     Coronary artery disease Maternal Grandfather 65         of MI at 65    Breast cancer Paternal Grandmother     Other Daughter 28        MTH-FR gene mutation       Social History     Socioeconomic History    Marital status:    Tobacco Use    Smoking status: Former     Current packs/day: 0.00     Average packs/day: 0.5 packs/day for 4.0 years (2.0 ttl pk-yrs)     Types: Cigarettes     Start date: 1972     Quit date: 1976     Years since quittin.4    Smokeless tobacco: Never    Tobacco comments:     Quit    Vaping Use    Vaping status: Never Used   Substance and Sexual Activity    Alcohol use: Yes     Alcohol/week: 10.0 standard drinks of alcohol     Types: 10 Glasses of wine per week     Comment: daILY    Drug use: No    Sexual activity: Yes     Partners: Male     Birth control/protection: Same-sex partner       Allergies   Allergen Reactions    Contrast Dye (Echo Or Unknown Ct/Mr) Hives    Naproxen Sodium Other (See Comments)     Excessive bruising   Aleive        Review of Systems:     Review of Systems    Vital Signs  Vitals:    06/10/24 1110   BP: 122/58   BP Location: Left arm   Patient Position: Sitting   Cuff Size: Adult   Pulse: 80   Temp: 97.8 °F (36.6 °C)   TempSrc: Infrared   SpO2: 98%   Weight: 56.2 kg (124 lb)   Height: 151.7 cm (59.72\")     Body mass index is 24.44 kg/m².  BMI is within normal parameters. No other follow-up for BMI required.          Current Outpatient Medications:     Calcium Carbonate-Vitamin D (CALTRATE 600+D PO), Take 1,500 mg by mouth Daily., Disp: , Rfl:     cetirizine (zyrTEC) 10 MG tablet, Take 1 tablet by mouth Daily As Needed for Allergies., Disp: , Rfl:     dimenhyDRINATE (DRAMAMINE PO), Take 1 tablet by mouth " At Night As Needed., Disp: , Rfl:     estradiol (VAGIFEM) 10 MCG tablet vaginal tablet, INSERT 1 TABLET INTO THE VAGINA 2-3 TIMES WEEKLY, Disp: 15 tablet, Rfl: 3    fluticasone (FLONASE) 50 MCG/ACT nasal spray, 2 sprays into the nostril(s) as directed by provider Daily As Needed for Rhinitis., Disp: , Rfl:     hyoscyamine (LEVSIN) 0.125 MG SL tablet, Place 1 tablet under the tongue Daily As Needed., Disp: , Rfl:     l-methylfolate 15 MG tablet tablet, Take 1 tablet by mouth Daily., Disp: 90 tablet, Rfl: 3    melatonin 5 MG tablet tablet, Take 1 tablet by mouth At Night As Needed., Disp: , Rfl:     mupirocin (BACTROBAN) 2 % ointment, Apply 1 application topically to the appropriate area as directed 2 (Two) Times a Day As Needed (irritation)., Disp: 22 g, Rfl: 0    omeprazole (priLOSEC) 20 MG capsule, Take 1 capsule by mouth Daily., Disp: 90 capsule, Rfl: 3    phenazopyridine (Pyridium) 200 MG tablet, Take 1 tablet by mouth 3 (Three) Times a Day As Needed for Bladder Spasms., Disp: 9 tablet, Rfl: 0    polyethylene glycol (MIRALAX) 17 GM/SCOOP powder, Take 17 g by mouth 2 (Two) Times a Day., Disp: , Rfl:     pravastatin (PRAVACHOL) 20 MG tablet, Take 1 tablet by mouth Every Night., Disp: 90 tablet, Rfl: 3    Psyllium (KONSYL DAILY FIBER PO), Take  by mouth 2 (Two) Times a Day. 1 serving, Disp: , Rfl:     sertraline (ZOLOFT) 50 MG tablet, Take 1 tablet by mouth Daily., Disp: 90 tablet, Rfl: 1    zolpidem (AMBIEN) 5 MG tablet, Take 1 tablet by mouth At Night As Needed for Sleep., Disp: 10 tablet, Rfl: 0    linaclotide (Linzess) 72 MCG capsule capsule, Take 1 capsule by mouth Every Morning Before Breakfast. (Patient not taking: Reported on 6/10/2024), Disp: 30 capsule, Rfl: 5    Physical Exam:    Physical Exam  Vitals and nursing note reviewed.   Constitutional:       Appearance: She is well-developed.   HENT:      Head: Normocephalic.   Eyes:      Conjunctiva/sclera: Conjunctivae normal.      Pupils: Pupils are equal,  round, and reactive to light.   Neck:      Thyroid: No thyromegaly.   Cardiovascular:      Rate and Rhythm: Normal rate and regular rhythm.      Heart sounds: Normal heart sounds.   Pulmonary:      Effort: Pulmonary effort is normal.      Breath sounds: Normal breath sounds. No wheezing.   Musculoskeletal:         General: Normal range of motion.      Cervical back: Normal range of motion and neck supple.   Lymphadenopathy:      Cervical: No cervical adenopathy.   Skin:     General: Skin is warm and dry.   Neurological:      Mental Status: She is alert and oriented to person, place, and time.   Psychiatric:         Thought Content: Thought content normal.          ACE III MINI        Results Review:    None  Results  Imaging  Colonoscopy showed three small polyps and diverticula.       CMP:  Lab Results   Component Value Date    BUN 18 03/06/2024    CREATININE 0.72 03/06/2024    EGFRIFNONA 78 01/18/2022    BCR 25.0 03/06/2024     03/06/2024    K 4.2 03/06/2024    CO2 24.7 03/06/2024    CALCIUM 9.4 03/06/2024    ALBUMIN 4.8 03/06/2024    BILITOT 0.5 03/06/2024    ALKPHOS 63 03/06/2024    AST 23 03/06/2024    ALT 17 03/06/2024     HbA1c:  Lab Results   Component Value Date    HGBA1C 5.3 07/13/2022    HGBA1C 4.80 09/14/2020     Microalbumin:  Lab Results   Component Value Date    MICROALBUR <1.2 03/06/2024     Lipid Panel  Lab Results   Component Value Date    CHOL 189 03/06/2024    TRIG 66 03/06/2024    HDL 79 (H) 03/06/2024    LDL 98 03/06/2024    AST 23 03/06/2024    ALT 17 03/06/2024       Medication Review: Medications reviewed and noted  Patient Instructions   Problem List Items Addressed This Visit          Gastrointestinal Abdominal     Constipation by delayed colonic transit - Primary       Mental Health    Situational anxiety (Chronic)          Diagnosis Plan   1. Constipation by delayed colonic transit        2. Situational anxiety          Assessment & Plan  1. Constipation.  The patient's symptoms do  not raise significant concern. Given that it has been only 2 years since her last colonoscopy, there is no necessity for a repeat procedure. The alterations in her bowel movements could potentially be attributed to multiple factors, including anxiety and dietary alterations and being out of her normal routine.  Colon polyps could cause a change in bowel habits, but it is unlikely she has significant least sized polyps and only 2 years.  Ovarian cancer is known for causing nonspecific symptoms that are vague, however she just had an ovarian ultrasound at  as part of their ovarian cancer screening.  Stress is the most likely contributing factor to her change in bowel habits. A prescription for Linzess 72 mcg has been issued. The patient is advised to continue using MiraLAX and Konsyl every day.  She may go up and down on the dosage of MiraLAX as symptoms require.  But continue taking it every day.  She will also continue drinking lots of fluids and eating lots of vegetables and fruits.  If she continues to have trouble evacuating enough stool, she will add Linzess.  It has been approved by her insurance.    Situational anxiety  She will continue current dose of sertraline daily.  We can increase the dose if needed or changed to a different antidepressant that helps with anxiety.  She will continue regular exercise and physical activity which also tend to help with symptoms of stress and anxiety.         Plan of care reviewed with patient at the conclusion of today's visit. Education was provided regarding diagnosis, management, and any prescribed or recommended OTC medications. Patient verbalizes understanding of and agreement with management plan.         06/10/24   11:14 EDT    Patient or patient representative verbalized consent for the use of Ambient Listening during the visit with  Agata Miner MD for chart documentation. 6/16/2024  16:45 EDT

## 2024-06-16 NOTE — PATIENT INSTRUCTIONS
Patient Instructions  Problem List Items Addressed This Visit          Gastrointestinal Abdominal     Constipation by delayed colonic transit - Primary       Mental Health    Situational anxiety (Chronic)       Exercising to Stay Healthy  To become healthy and stay healthy, it is recommended that you do moderate-intensity and vigorous-intensity exercise. You can tell that you are exercising at a moderate intensity if your heart starts beating faster and you start breathing faster but can still hold a conversation. You can tell that you are exercising at a vigorous intensity if you are breathing much harder and faster and cannot hold a conversation while exercising.  How can exercise benefit me?  Exercising regularly is important. It has many health benefits, such as:  Improving overall fitness, flexibility, and endurance.  Increasing bone density.  Helping with weight control.  Decreasing body fat.  Increasing muscle strength and endurance.  Reducing stress and tension, anxiety, depression, or anger.  Improving overall health.  What guidelines should I follow while exercising?  Before you start a new exercise program, talk with your health care provider.  Do not exercise so much that you hurt yourself, feel dizzy, or get very short of breath.  Wear comfortable clothes and wear shoes with good support.  Drink plenty of water while you exercise to prevent dehydration or heat stroke.  Work out until your breathing and your heartbeat get faster (moderate intensity).  How often should I exercise?  Choose an activity that you enjoy, and set realistic goals. Your health care provider can help you make an activity plan that is individually designed and works best for you.  Exercise regularly as told by your health care provider. This may include:  Doing strength training two times a week, such as:  Lifting weights.  Using resistance bands.  Push-ups.  Sit-ups.  Yoga.  Doing a certain intensity of exercise for a given amount  of time. Choose from these options:  A total of 150 minutes of moderate-intensity exercise every week.  A total of 75 minutes of vigorous-intensity exercise every week.  A mix of moderate-intensity and vigorous-intensity exercise every week.  Children, pregnant women, people who have not exercised regularly, people who are overweight, and older adults may need to talk with a health care provider about what activities are safe to perform. If you have a medical condition, be sure to talk with your health care provider before you start a new exercise program.  What are some exercise ideas?  Moderate-intensity exercise ideas include:  Walking 1 mile (1.6 km) in about 15 minutes.  Biking.  Hiking.  Golfing.  Dancing.  Water aerobics.  Vigorous-intensity exercise ideas include:  Walking 4.5 miles (7.2 km) or more in about 1 hour.  Jogging or running 5 miles (8 km) in about 1 hour.  Biking 10 miles (16.1 km) or more in about 1 hour.  Lap swimming.  Roller-skating or in-line skating.  Cross-country skiing.  Vigorous competitive sports, such as football, basketball, and soccer.  Jumping rope.  Aerobic dancing.  What are some everyday activities that can help me get exercise?  Yard work, such as:  Pushing a .  Raking and bagging leaves.  Washing your car.  Pushing a stroller.  Shoveling snow.  Gardening.  Washing windows or floors.  How can I be more active in my day-to-day activities?  Use stairs instead of an elevator.  Take a walk during your lunch break.  If you drive, park your car farther away from your work or school.  If you take public transportation, get off one stop early and walk the rest of the way.  Stand up or walk around during all of your indoor phone calls.  Get up, stretch, and walk around every 30 minutes throughout the day.  Enjoy exercise with a friend. Support to continue exercising will help you keep a regular routine of activity.  Where to find more information  You can find more information  about exercising to stay healthy from:  U.S. Department of Health and Human Services: www.hhs.gov  Centers for Disease Control and Prevention (CDC): www.cdc.gov  Summary  Exercising regularly is important. It will improve your overall fitness, flexibility, and endurance.  Regular exercise will also improve your overall health. It can help you control your weight, reduce stress, and improve your bone density.  Do not exercise so much that you hurt yourself, feel dizzy, or get very short of breath.  Before you start a new exercise program, talk with your health care provider.  This information is not intended to replace advice given to you by your health care provider. Make sure you discuss any questions you have with your health care provider.  Document Revised: 04/15/2022 Document Reviewed: 04/15/2022  [employer] Patient Education © 2023 [employer] Inc.

## 2024-07-26 DIAGNOSIS — N95.2 ATROPHIC VAGINITIS: ICD-10-CM

## 2024-07-26 RX ORDER — ESTRADIOL 10 UG/1
INSERT VAGINAL
Qty: 12 TABLET | Refills: 3 | Status: SHIPPED | OUTPATIENT
Start: 2024-07-26

## 2024-08-20 DIAGNOSIS — N95.2 ATROPHIC VAGINITIS: ICD-10-CM

## 2024-08-20 RX ORDER — ESTRADIOL 10 UG/1
INSERT VAGINAL
Qty: 12 TABLET | Refills: 3 | Status: SHIPPED | OUTPATIENT
Start: 2024-08-20

## 2024-08-20 NOTE — TELEPHONE ENCOUNTER
Rx Refill Note  Requested Prescriptions     Pending Prescriptions Disp Refills    estradiol (VAGIFEM) 10 MCG tablet vaginal tablet [Pharmacy Med Name: ESTRADIOL 10MCG VAGINAL INS 10 Tablet] 12 tablet 3     Sig: INSERT 1 TABLET INTO THE VAGINA 2-3 TIMES WEEKLY      Last office visit with prescribing clinician: 6/10/2024   Last telemedicine visit with prescribing clinician: Visit date not found   Next office visit with prescribing clinician: 10/1/2024                         Would you like a call back once the refill request has been completed: [] Yes [] No    If the office needs to give you a call back, can they leave a voicemail: [] Yes [] No    Dalila Trevino LPN  08/20/24, 10:59 EDT

## 2024-11-12 RX ORDER — LEVOMEFOLATE CALCIUM 15 MG
TABLET ORAL DAILY
Qty: 90 TABLET | Refills: 3 | Status: SHIPPED | OUTPATIENT
Start: 2024-11-12

## 2024-12-09 DIAGNOSIS — N95.2 ATROPHIC VAGINITIS: ICD-10-CM

## 2024-12-09 RX ORDER — ESTRADIOL 10 UG/1
INSERT VAGINAL
Qty: 12 TABLET | Refills: 3 | Status: SHIPPED | OUTPATIENT
Start: 2024-12-09

## 2025-02-11 DIAGNOSIS — E78.00 HYPERCHOLESTEROLEMIA: ICD-10-CM

## 2025-02-11 RX ORDER — PRAVASTATIN SODIUM 20 MG
20 TABLET ORAL NIGHTLY
Qty: 90 TABLET | Refills: 4 | Status: SHIPPED | OUTPATIENT
Start: 2025-02-11

## 2025-03-17 ENCOUNTER — LAB (OUTPATIENT)
Dept: LAB | Facility: HOSPITAL | Age: 70
End: 2025-03-17
Payer: MEDICARE

## 2025-03-17 DIAGNOSIS — K14.6 BURNING MOUTH SYNDROME: ICD-10-CM

## 2025-03-17 DIAGNOSIS — E78.00 HYPERCHOLESTEROLEMIA: ICD-10-CM

## 2025-03-17 DIAGNOSIS — R79.9 ABNORMAL FINDING OF BLOOD CHEMISTRY, UNSPECIFIED: ICD-10-CM

## 2025-03-17 DIAGNOSIS — E55.9 VITAMIN D DEFICIENCY: ICD-10-CM

## 2025-03-17 LAB
25(OH)D3 SERPL-MCNC: 44.2 NG/ML (ref 30–100)
ALBUMIN SERPL-MCNC: 4.7 G/DL (ref 3.5–5.2)
ALBUMIN UR-MCNC: <1.2 MG/DL
ALBUMIN/GLOB SERPL: 1.8 G/DL
ALP SERPL-CCNC: 62 U/L (ref 39–117)
ALT SERPL W P-5'-P-CCNC: 26 U/L (ref 1–33)
ANION GAP SERPL CALCULATED.3IONS-SCNC: 10.6 MMOL/L (ref 5–15)
AST SERPL-CCNC: 25 U/L (ref 1–32)
BACTERIA UR QL AUTO: ABNORMAL /HPF
BASOPHILS # BLD AUTO: 0.04 10*3/MM3 (ref 0–0.2)
BASOPHILS NFR BLD AUTO: 0.6 % (ref 0–1.5)
BILIRUB SERPL-MCNC: 0.4 MG/DL (ref 0–1.2)
BILIRUB UR QL STRIP: NEGATIVE
BUN SERPL-MCNC: 14 MG/DL (ref 8–23)
BUN/CREAT SERPL: 15.9 (ref 7–25)
CALCIUM SPEC-SCNC: 9.4 MG/DL (ref 8.6–10.5)
CHLORIDE SERPL-SCNC: 100 MMOL/L (ref 98–107)
CHOLEST SERPL-MCNC: 216 MG/DL (ref 0–200)
CLARITY UR: ABNORMAL
CO2 SERPL-SCNC: 26.4 MMOL/L (ref 22–29)
COLOR UR: YELLOW
CREAT SERPL-MCNC: 0.88 MG/DL (ref 0.57–1)
CREAT UR-MCNC: 57.5 MG/DL
CRP SERPL-MCNC: 0.05 MG/DL (ref 0.01–0.5)
DEPRECATED RDW RBC AUTO: 40.8 FL (ref 37–54)
EGFRCR SERPLBLD CKD-EPI 2021: 71.7 ML/MIN/1.73
EOSINOPHIL # BLD AUTO: 0.16 10*3/MM3 (ref 0–0.4)
EOSINOPHIL NFR BLD AUTO: 2.6 % (ref 0.3–6.2)
ERYTHROCYTE [DISTWIDTH] IN BLOOD BY AUTOMATED COUNT: 12.6 % (ref 12.3–15.4)
ERYTHROCYTE [SEDIMENTATION RATE] IN BLOOD: 15 MM/HR (ref 0–30)
GLOBULIN UR ELPH-MCNC: 2.6 GM/DL
GLUCOSE SERPL-MCNC: 76 MG/DL (ref 65–99)
GLUCOSE UR STRIP-MCNC: NEGATIVE MG/DL
HBA1C MFR BLD: 5.4 % (ref 4.8–5.6)
HCT VFR BLD AUTO: 43.2 % (ref 34–46.6)
HCYS SERPL-MCNC: 19 UMOL/L (ref 0–15)
HDLC SERPL-MCNC: 72 MG/DL (ref 40–60)
HGB BLD-MCNC: 14.3 G/DL (ref 12–15.9)
HGB UR QL STRIP.AUTO: NEGATIVE
HYALINE CASTS UR QL AUTO: ABNORMAL /LPF
IMM GRANULOCYTES # BLD AUTO: 0.02 10*3/MM3 (ref 0–0.05)
IMM GRANULOCYTES NFR BLD AUTO: 0.3 % (ref 0–0.5)
KETONES UR QL STRIP: NEGATIVE
LDLC SERPL CALC-MCNC: 127 MG/DL (ref 0–100)
LDLC/HDLC SERPL: 1.73 {RATIO}
LEUKOCYTE ESTERASE UR QL STRIP.AUTO: NEGATIVE
LYMPHOCYTES # BLD AUTO: 2.02 10*3/MM3 (ref 0.7–3.1)
LYMPHOCYTES NFR BLD AUTO: 32.5 % (ref 19.6–45.3)
MCH RBC QN AUTO: 29.2 PG (ref 26.6–33)
MCHC RBC AUTO-ENTMCNC: 33.1 G/DL (ref 31.5–35.7)
MCV RBC AUTO: 88.2 FL (ref 79–97)
MICROALBUMIN/CREAT UR: NORMAL MG/G{CREAT}
MONOCYTES # BLD AUTO: 0.48 10*3/MM3 (ref 0.1–0.9)
MONOCYTES NFR BLD AUTO: 7.7 % (ref 5–12)
NEUTROPHILS NFR BLD AUTO: 3.49 10*3/MM3 (ref 1.7–7)
NEUTROPHILS NFR BLD AUTO: 56.3 % (ref 42.7–76)
NITRITE UR QL STRIP: NEGATIVE
NRBC BLD AUTO-RTO: 0 /100 WBC (ref 0–0.2)
PH UR STRIP.AUTO: 7.5 [PH] (ref 5–8)
PLATELET # BLD AUTO: 230 10*3/MM3 (ref 140–450)
PMV BLD AUTO: 12.8 FL (ref 6–12)
POTASSIUM SERPL-SCNC: 4.4 MMOL/L (ref 3.5–5.2)
PROT SERPL-MCNC: 7.3 G/DL (ref 6–8.5)
PROT UR QL STRIP: NEGATIVE
RBC # BLD AUTO: 4.9 10*6/MM3 (ref 3.77–5.28)
RBC # UR STRIP: ABNORMAL /HPF
REF LAB TEST METHOD: ABNORMAL
SODIUM SERPL-SCNC: 137 MMOL/L (ref 136–145)
SP GR UR STRIP: 1.01 (ref 1–1.03)
SQUAMOUS #/AREA URNS HPF: ABNORMAL /HPF
TRIGL SERPL-MCNC: 96 MG/DL (ref 0–150)
TSH SERPL DL<=0.05 MIU/L-ACNC: 1.39 UIU/ML (ref 0.27–4.2)
UROBILINOGEN UR QL STRIP: ABNORMAL
VIT B12 BLD-MCNC: 514 PG/ML (ref 211–946)
VLDLC SERPL-MCNC: 17 MG/DL (ref 5–40)
WBC # UR STRIP: ABNORMAL /HPF
WBC NRBC COR # BLD AUTO: 6.21 10*3/MM3 (ref 3.4–10.8)

## 2025-03-17 PROCEDURE — 83036 HEMOGLOBIN GLYCOSYLATED A1C: CPT

## 2025-03-17 PROCEDURE — 80061 LIPID PANEL: CPT

## 2025-03-17 PROCEDURE — 82306 VITAMIN D 25 HYDROXY: CPT

## 2025-03-17 PROCEDURE — 85025 COMPLETE CBC W/AUTO DIFF WBC: CPT

## 2025-03-17 PROCEDURE — 83090 ASSAY OF HOMOCYSTEINE: CPT

## 2025-03-17 PROCEDURE — 82043 UR ALBUMIN QUANTITATIVE: CPT

## 2025-03-17 PROCEDURE — 80053 COMPREHEN METABOLIC PANEL: CPT

## 2025-03-17 PROCEDURE — 86141 C-REACTIVE PROTEIN HS: CPT

## 2025-03-17 PROCEDURE — 82570 ASSAY OF URINE CREATININE: CPT

## 2025-03-17 PROCEDURE — 85652 RBC SED RATE AUTOMATED: CPT

## 2025-03-17 PROCEDURE — 82607 VITAMIN B-12: CPT

## 2025-03-17 PROCEDURE — 81001 URINALYSIS AUTO W/SCOPE: CPT

## 2025-03-17 PROCEDURE — 84443 ASSAY THYROID STIM HORMONE: CPT

## 2025-03-21 ENCOUNTER — OFFICE VISIT (OUTPATIENT)
Dept: INTERNAL MEDICINE | Facility: CLINIC | Age: 70
End: 2025-03-21
Payer: MEDICARE

## 2025-03-21 VITALS
SYSTOLIC BLOOD PRESSURE: 108 MMHG | HEART RATE: 80 BPM | OXYGEN SATURATION: 99 % | DIASTOLIC BLOOD PRESSURE: 72 MMHG | BODY MASS INDEX: 25.01 KG/M2 | WEIGHT: 127.4 LBS | TEMPERATURE: 97.8 F | HEIGHT: 60 IN

## 2025-03-21 DIAGNOSIS — E78.00 HYPERCHOLESTEROLEMIA: ICD-10-CM

## 2025-03-21 DIAGNOSIS — Z23 NEED FOR VACCINATION: ICD-10-CM

## 2025-03-21 DIAGNOSIS — Z00.00 MEDICARE ANNUAL WELLNESS VISIT, SUBSEQUENT: Primary | ICD-10-CM

## 2025-03-21 DIAGNOSIS — E55.9 VITAMIN D DEFICIENCY: ICD-10-CM

## 2025-03-21 DIAGNOSIS — K21.9 GASTROESOPHAGEAL REFLUX DISEASE WITHOUT ESOPHAGITIS: ICD-10-CM

## 2025-03-21 DIAGNOSIS — F41.8 SITUATIONAL ANXIETY: Chronic | ICD-10-CM

## 2025-03-21 DIAGNOSIS — K14.6 BURNING MOUTH SYNDROME: ICD-10-CM

## 2025-03-21 DIAGNOSIS — Z00.00 ANNUAL PHYSICAL EXAM: ICD-10-CM

## 2025-03-21 DIAGNOSIS — R79.83 HOMOCYSTEINEMIA: ICD-10-CM

## 2025-03-21 DIAGNOSIS — K59.01 CONSTIPATION BY DELAYED COLONIC TRANSIT: ICD-10-CM

## 2025-03-21 DIAGNOSIS — M85.832 OSTEOPENIA OF LEFT FOREARM: Chronic | ICD-10-CM

## 2025-03-21 DIAGNOSIS — R79.9 ABNORMAL FINDING OF BLOOD CHEMISTRY, UNSPECIFIED: ICD-10-CM

## 2025-03-21 RX ORDER — LEVOMEFOLATE CALCIUM 15 MG
15 TABLET ORAL DAILY
Qty: 90 TABLET | Refills: 3 | Status: SHIPPED | OUTPATIENT
Start: 2025-03-21

## 2025-03-21 RX ORDER — PRAVASTATIN SODIUM 20 MG
20 TABLET ORAL NIGHTLY
Qty: 90 TABLET | Refills: 4 | Status: SHIPPED | OUTPATIENT
Start: 2025-03-21

## 2025-03-21 NOTE — LETTER
[facility]  Vaccine Consent Form    Patient Name:  Gricelda Matos  Patient :  1955        Screening Checklist  The following questions should be completed prior to vaccination. If you answer “yes” to any question, it does not necessarily mean you should not be vaccinated. It just means we may need to clarify or ask more questions. If a question is unclear, please ask your healthcare provider to explain it.    Yes No   Any fever or moderate to severe illness today (mild illness and/or antibiotic treatment are not contraindications)?     Do you have a history of a serious reaction to any previous vaccinations, such as anaphylaxis, encephalopathy within 7 days, Guillain-[place] syndrome within 6 weeks, seizure?     Have you received any live vaccine(s) (e.g MMR, SIOMARA) or any other vaccines in the last month (to ensure duplicate doses aren't given)?     Do you have an anaphylactic allergy to latex (DTaP, DTaP-IPV, Hep A, Hep B, MenB, RV, Td, Tdap), baker’s yeast (Hep B, HPV), polysorbates (RSV, nirsevimab, PCV 20, Rotavirrus, Tdap, Shingrix), or gelatin (SIOMARA, MMR)?     Do you have an anaphylactic allergy to neomycin (Rabies, SIOMARA, MMR, IPV, Hep A), polymyxin B (IPV), or streptomycin (IPV)?      Any cancer, leukemia, AIDS, or other immune system disorder? (SIOMARA, MMR, RV)     Do you have a parent, brother, or sister with an immune system problem (if immune competence of vaccine recipient clinically verified, can proceed)? (MMR, SIOMARA)     Any recent steroid treatments for >2 weeks, chemotherapy, or radiation treatment? (SIOMARA, MMR)     Have you received antibody-containing blood transfusions or IVIG in the past 11 months (recommended interval is dependent on product)? (MMR, SIOMARA)     Have you taken antiviral drugs (acyclovir, famciclovir, valacyclovir for SIOMARA) in the last 24 or 48 hours, respectively?      Are you pregnant or planning to become pregnant within 1 month? (SIOMARA, MMR, HPV, IPV, MenB, Abrexvy; For Hep B-  "refer to Engerix-B; For RSV - Abrysvo is indicated for 32-36 weeks of pregnancy from September to January)     For infants, have you ever been told your child has had intussusception or a medical emergency involving obstruction of the intestine (Rotavirus)? If not for an infant, can skip this question.         *Ordering Physicians/APC should be consulted if \"yes\" is checked by the patient or guardian above.  I have received, read, and understand the Vaccine Information Statement (VIS) for each vaccine ordered.  I have considered my or my child's health status as well as the health status of my close contacts.  I have taken the opportunity to discuss my vaccine questions with my or my child's health care provider.   I have requested that the ordered vaccine(s) be given to me or my child.  I understand the benefits and risks of the vaccines.  I understand that I should remain in the clinic for 15 minutes after receiving the vaccine(s).  _________________________________________________________  Signature of Patient or Parent/Legal Guardian ____________________  Date     "

## 2025-03-21 NOTE — PROGRESS NOTES
Subjective   The ABCs of the Annual Wellness Visit  Medicare Wellness Visit      Gricelda Matos is a 69 y.o. patient who presents for a Medicare Wellness Visit.    The following portions of the patient's history were reviewed and   updated as appropriate: allergies, current medications, past family history, past medical history, past social history, past surgical history, and problem list.    Compared to one year ago, the patient's physical   health is the same.  Compared to one year ago, the patient's mental   health is the same.    Recent Hospitalizations:  She was not admitted to the hospital during the last year.     Current Medical Providers:  Patient Care Team:  Agata Miner MD as PCP - General  Agata Miner MD as PCP - Family Medicine    Outpatient Medications Prior to Visit   Medication Sig Dispense Refill    Calcium Carbonate-Vitamin D (CALTRATE 600+D PO) Take 1,500 mg by mouth Daily.      dimenhyDRINATE (DRAMAMINE PO) Take 1 tablet by mouth At Night As Needed.      estradiol (VAGIFEM) 10 MCG tablet vaginal tablet INSERT 1 TABLET INTO THE VAGINA 2-3 TIMES WEEKLY 12 tablet 3    fluticasone (FLONASE) 50 MCG/ACT nasal spray Administer 2 sprays into the nostril(s) as directed by provider Daily As Needed for Rhinitis.      hyoscyamine (LEVSIN) 0.125 MG SL tablet Place 1 tablet under the tongue Daily As Needed.      mupirocin (BACTROBAN) 2 % ointment Apply 1 application topically to the appropriate area as directed 2 (Two) Times a Day As Needed (irritation). 22 g 0    phenazopyridine (Pyridium) 200 MG tablet Take 1 tablet by mouth 3 (Three) Times a Day As Needed for Bladder Spasms. 9 tablet 0    Psyllium (KONSYL DAILY FIBER PO) Take  by mouth 2 (Two) Times a Day. 1 serving      l-methylfolate 15 MG tablet tablet TAKE 1 TABLET BY MOUTH DAILY 90 tablet 3    pravastatin (PRAVACHOL) 20 MG tablet TAKE 1 TABLET BY MOUTH EVERY NIGHT. 90 tablet 4    sertraline (ZOLOFT) 50 MG tablet TAKE 1 TABLET BY MOUTH  DAILY. 30 tablet 1    cetirizine (zyrTEC) 10 MG tablet Take 1 tablet by mouth Daily As Needed for Allergies. (Patient not taking: Reported on 3/21/2025)      linaclotide (Linzess) 72 MCG capsule capsule Take 1 capsule by mouth Every Morning Before Breakfast. (Patient not taking: Reported on 3/21/2025) 30 capsule 5    melatonin 5 MG tablet tablet Take 1 tablet by mouth At Night As Needed. (Patient not taking: Reported on 3/21/2025)      omeprazole (priLOSEC) 20 MG capsule Take 1 capsule by mouth Daily. (Patient not taking: Reported on 3/21/2025) 90 capsule 3    polyethylene glycol (MIRALAX) 17 GM/SCOOP powder Take 17 g by mouth 2 (Two) Times a Day. (Patient not taking: Reported on 3/21/2025)      zolpidem (AMBIEN) 5 MG tablet Take 1 tablet by mouth At Night As Needed for Sleep. (Patient not taking: Reported on 3/21/2025) 10 tablet 0     No facility-administered medications prior to visit.     No opioid medication identified on active medication list. I have reviewed chart for other potential  high risk medication/s and harmful drug interactions in the elderly.      Aspirin is not on active medication list.  Aspirin use is not indicated based on review of current medical condition/s. Risk of harm outweighs potential benefits.  .    Patient Active Problem List   Diagnosis    Homocysteinemia    GERD (gastroesophageal reflux disease)    Vitamin D deficiency    Cervicalgia    Hypercholesterolemia    Constipation by delayed colonic transit    Benign meningioma of brain    Leg pain, right    Seasonal allergic rhinitis due to pollen    Bilateral hip pain    Folate deficiency    Former smoker    Adjustment insomnia    Menopause    Ovarian cyst    Hip pain, left    Pap test, as part of routine gynecological examination    Atrophic vaginitis    Annual physical exam    Pain of right upper arm    Upper back pain on right side    Neck fullness    Urinary urgency    Seborrheic dermatitis of scalp    Medicare annual wellness visit,  "subsequent    Acute right-sided low back pain without sciatica    Osteopenia of left forearm    Lightheaded    Fluctuating blood pressure    Acute pain of left shoulder    Situational anxiety     Advance Care Planning Advance Directive is on file.  ACP discussion was held with the patient during this visit. Patient has an advance directive in EMR which is still valid.             Objective   Vitals:    25 1023   BP: 108/72   BP Location: Left arm   Patient Position: Sitting   Cuff Size: Adult   Pulse: 80   Temp: 97.8 °F (36.6 °C)   TempSrc: Infrared   SpO2: 99%   Weight: 57.8 kg (127 lb 6.4 oz)   Height: 152 cm (59.84\")   PainSc: 0-No pain       Estimated body mass index is 25.01 kg/m² as calculated from the following:    Height as of this encounter: 152 cm (59.84\").    Weight as of this encounter: 57.8 kg (127 lb 6.4 oz).                Does the patient have evidence of cognitive impairment? No  Lab Results   Component Value Date    TRIG 96 2025    HDL 72 (H) 2025     (H) 2025    VLDL 17 2025    HGBA1C 5.40 2025       ECG 12 Lead    Date/Time: 3/21/2025 1:54 PM  Performed by: Agata Miner MD    Authorized by: Agata Miner MD  Comparison: compared with previous ECG   Similar to previous ECG  Rhythm: sinus rhythm  Rate: normal  BPM: 73  Conduction: conduction normal  ST Segments: ST segments normal  T Waves: T waves normal  QRS axis: normal    Clinical impression: normal ECG                                                                                                Health  Risk Assessment    Smoking Status:  Social History     Tobacco Use   Smoking Status Former    Current packs/day: 0.00    Average packs/day: 0.5 packs/day for 4.0 years (2.0 ttl pk-yrs)    Types: Cigarettes    Start date: 1972    Quit date: 1976    Years since quittin.2   Smokeless Tobacco Never   Tobacco Comments    Quit 2006     Alcohol Consumption:  Social History "     Substance and Sexual Activity   Alcohol Use Yes    Alcohol/week: 10.0 standard drinks of alcohol    Types: 10 Glasses of wine per week    Comment: daILY       Fall Risk Screen  ABIGAILADI Fall Risk Assessment was completed, and patient is at LOW risk for falls.Assessment completed on:3/21/2025    Depression Screening   Little interest or pleasure in doing things? Not at all   Feeling down, depressed, or hopeless? Not at all   PHQ-2 Total Score 0      Health Habits and Functional and Cognitive Screening:      3/21/2025    10:22 AM   Functional & Cognitive Status   Do you have difficulty preparing food and eating? No   Do you have difficulty bathing yourself, getting dressed or grooming yourself? No   Do you have difficulty using the toilet? No   Do you have difficulty moving around from place to place? No   Do you have trouble with steps or getting out of a bed or a chair? No   Current Diet Well Balanced Diet   Dental Exam Up to date   Eye Exam Up to date   Exercise (times per week) 7 times per week   Current Exercises Include Walking;Pilates   Do you need help using the phone?  No   Are you deaf or do you have serious difficulty hearing?  No   Do you need help to go to places out of walking distance? No   Do you need help shopping? No   Do you need help preparing meals?  No   Do you need help with housework?  No   Do you need help with laundry? No   Do you need help taking your medications? No   Do you need help managing money? No   Do you ever drive or ride in a car without wearing a seat belt? No   Have you felt unusual stress, anger or loneliness in the last month? No   Who do you live with? Spouse   If you need help, do you have trouble finding someone available to you? No   Have you been bothered in the last four weeks by sexual problems? No   Do you have difficulty concentrating, remembering or making decisions? No           Age-appropriate Screening Schedule:  Refer to the list below for future screening  recommendations based on patient's age, sex and/or medical conditions. Orders for these recommended tests are listed in the plan section. The patient has been provided with a written plan.    Health Maintenance List  Health Maintenance   Topic Date Due    HEPATITIS C SCREENING  Never done    PAP SMEAR  05/18/2022    DXA SCAN  04/12/2024    COLORECTAL CANCER SCREENING  02/03/2025    COVID-19 Vaccine (10 - 2024-25 season) 03/04/2025    ANNUAL WELLNESS VISIT  03/11/2025    MAMMOGRAM  05/26/2025    BMI FOLLOWUP  05/01/2025    LIPID PANEL  03/17/2026    TDAP/TD VACCINES (3 - Td or Tdap) 10/08/2028    INFLUENZA VACCINE  Completed    Pneumococcal Vaccine 50+  Completed    ZOSTER VACCINE  Completed                                                                                                                                                CMS Preventative Services Quick Reference  Risk Factors Identified During Encounter  None Identified    The above risks/problems have been discussed with the patient.  Pertinent information has been shared with the patient in the After Visit Summary.  An After Visit Summary and PPPS were made available to the patient.    Follow Up:   Next Medicare Wellness visit to be scheduled in 1 year.         Additional E&M Note during same encounter follows:  Patient has additional, significant, and separately identifiable condition(s)/problem(s) that require work above and beyond the Medicare Wellness Visit     Chief Complaint  Medicare Wellness-subsequent and Hyperlipidemia    Subjective    HPI  Gricelda is also being seen today for an annual adult preventative physical exam.        The patient presents for an annual wellness visit.    She reports no significant health concerns at present. She has expressed interest in incorporating B12 into her regimen. She is currently taking a daily dose of 1000 mg of vitamin D and calcium with D. She has reduced her wine consumption by half, now abstaining from it  three days a week. She has been advised to avoid dairy products due to her irritable bowel syndrome. She has expressed interest in incorporating B12 into her regimen. She is currently taking a daily dose of 1000 mg of vitamin D and calcium with D.    She has expressed concern about the presence of squamous cells in her urine, a finding she believes may have occurred previously.    She maintains a regular exercise routine, excluding the period since January when she sustained a hip injury. She has been attending physical therapy at [facility], which has resulted in significant improvement in her shoulder condition. She continues to work on strengthening her hip, which still exhibits some muscle weakness. Her typical exercise regimen includes achieving over 10,000 steps daily, weight training twice a week, Pilates twice a week, and yoga once a week.    She has successfully weaned off Prilosec and Pepcid, and currently does not experience any heartburn or indigestion.    She has not required Linzess or MiraLAX. She was previously advised to take MiraLAX and Konsyl for constipation in May 2024, but found that MiraLAX exacerbated her symptoms. She has since discontinued MiraLAX and continues to take Konsyl, which she reports as being effective.    She occasionally uses Ambien for sleep and reports good sleep quality. She is considering discontinuing her 50 mg dose of sertraline but is hesitant due to ongoing political issues and her sister's diagnosis of mild cognitive impairment.    She underwent a bone scan in May 2024, which revealed mild osteopenia.    She is currently on pravastatin for cholesterol management.    SOCIAL HISTORY  She has cut her wine consumption in half, now consuming 7 glasses per week with three dry days.    FAMILY HISTORY  Her sister has mild cognitive impairment.    MEDICATIONS  Current: pravastatin, L-methylfolate, sertraline, calcium with vitamin D, Konsyl, Ambien (as needed)  Discontinued:  "Prilosec, Pepcid, MiraLAX    IMMUNIZATIONS  She is up to date on tetanus, RSV, flu, and COVID-19 vaccines. She received her last COVID-19 vaccine on 09/24/2024. She received a pneumonia vaccine today.          Objective   Vital Signs:  /72 (BP Location: Left arm, Patient Position: Sitting, Cuff Size: Adult)   Pulse 80   Temp 97.8 °F (36.6 °C) (Infrared)   Ht 152 cm (59.84\")   Wt 57.8 kg (127 lb 6.4 oz)   SpO2 99%   BMI 25.01 kg/m²   Physical Exam  Vitals and nursing note reviewed.   Constitutional:       Appearance: She is well-developed.   HENT:      Head: Normocephalic.   Eyes:      Conjunctiva/sclera: Conjunctivae normal.      Pupils: Pupils are equal, round, and reactive to light.   Neck:      Thyroid: No thyromegaly.   Cardiovascular:      Rate and Rhythm: Normal rate and regular rhythm.      Heart sounds: Normal heart sounds.   Pulmonary:      Effort: Pulmonary effort is normal.      Breath sounds: Normal breath sounds. No wheezing.   Chest:   Breasts:     Right: No inverted nipple, mass, nipple discharge, skin change or tenderness.      Left: No inverted nipple, mass, nipple discharge, skin change or tenderness.   Abdominal:      General: Bowel sounds are normal.      Palpations: Abdomen is soft.      Tenderness: There is no abdominal tenderness.   Musculoskeletal:         General: No tenderness. Normal range of motion.      Cervical back: Normal range of motion and neck supple.      Right lower leg: No edema.      Left lower leg: No edema.   Lymphadenopathy:      Cervical: No cervical adenopathy.      Upper Body:      Right upper body: No supraclavicular, axillary or pectoral adenopathy.      Left upper body: No supraclavicular, axillary or pectoral adenopathy.   Skin:     General: Skin is warm and dry.      Findings: No rash.   Neurological:      Mental Status: She is alert and oriented to person, place, and time.      Cranial Nerves: No cranial nerve deficit.      Sensory: No sensory deficit. "      Coordination: Coordination normal.      Gait: Gait normal.   Psychiatric:         Attention and Perception: Attention normal.         Mood and Affect: Mood normal.         Speech: Speech normal.         Behavior: Behavior normal.         Thought Content: Thought content normal.         Cognition and Memory: Cognition normal.         Judgment: Judgment normal.               The following data was reviewed by: Agata Miner MD on 03/21/2025:  Data reviewed : Radiologic studies DEXA ANGIE  CMP          3/17/2025    10:20   CMP   Glucose 76    BUN 14    Creatinine 0.88    EGFR 71.7    Sodium 137    Potassium 4.4    Chloride 100    Calcium 9.4    Total Protein 7.3    Albumin 4.7    Globulin 2.6    Total Bilirubin 0.4    Alkaline Phosphatase 62    AST (SGOT) 25    ALT (SGPT) 26    Albumin/Globulin Ratio 1.8    BUN/Creatinine Ratio 15.9    Anion Gap 10.6      CBC          3/17/2025    10:20   CBC   WBC 6.21    RBC 4.90    Hemoglobin 14.3    Hematocrit 43.2    MCV 88.2    MCH 29.2    MCHC 33.1    RDW 12.6    Platelets 230      CBC w/diff          3/17/2025    10:20   CBC w/Diff   WBC 6.21    RBC 4.90    Hemoglobin 14.3    Hematocrit 43.2    MCV 88.2    MCH 29.2    MCHC 33.1    RDW 12.6    Platelets 230    Neutrophil Rel % 56.3    Immature Granulocyte Rel % 0.3    Lymphocyte Rel % 32.5    Monocyte Rel % 7.7    Eosinophil Rel % 2.6    Basophil Rel % 0.6      Lipid Panel          3/17/2025    10:20   Lipid Panel   Total Cholesterol 216    Triglycerides 96    HDL Cholesterol 72    VLDL Cholesterol 17    LDL Cholesterol  127    LDL/HDL Ratio 1.73      TSH          3/17/2025    10:20   TSH   TSH 1.390        Results  Laboratory Studies  Urine test shows presence of squamous cells, no sugar, slightly cloudy, no white blood cells, no red blood cells. A1c is about 108. Triglycerides are normal. LDL is 127. Thyroid levels are fine. Folate levels are high. B12 level is 514. Vitamin D is 54. Sed rate and C-reactive protein are  normal. White blood cell count, red blood cell count, platelet count are all normal. Blood sugar at the moment of the lab draw was 76. Creatinine shows normal kidney function and filtration rate. Liver function is normal.    Imaging  Bone scan from 2022 shows mild osteopenia.    Testing  EKG is normal.           Assessment and Plan Additional age appropriate preventative wellness advice topics were discussed during today's preventative wellness exam(some topics already addressed during AWV portion of the note above):    Physical Activity: Advised cardiovascular activity 150 minutes per week as tolerated. (example brisk walk for 30 minutes, 5 days a week).     Nutrition: Discussed nutrition plan with patient. Information shared in after visit summary. Goal is for a well balanced diet to enhance overall health.     Healthy Weight: Discussed current and goal BMI with patient. Steps to attain this goal discussed. Information shared in after visit summary.       1. Medicare annual wellness visit and annual physical exam.  She is up-to-date on DEXA, mammogram, and colonoscopy. Her immunizations are current. A pneumonia vaccine (20-valent) was administered today.    2. Hypercholesterolemia.  She will continue to adhere to a low-fat, healthy diet and increase aerobic exercise. Pravastatin will be continued nightly. A re-evaluation will be conducted in 6 months.    3. Gastroesophageal reflux disease (GERD).  Her symptoms have shown improvement. She will continue to avoid eating close to bedtime and large meals.    4. Homocystinuria.  She will maintain her daily intake of L-methylfolate 15 mg. Increasing the dose has not been shown to provide additional benefits.    5. Situational anxiety.  She will continue her current daily dose of sertraline. Exercise and physical activity have also been beneficial in reducing stress, anxiety, and mood symptoms.    6. Chronic constipation.  She will continue her regimen of Konsyl twice  daily and a morning shake containing protein, berries, and vegetables.    7. Osteopenia.  She will continue regular weight-bearing exercises, including walking, using small hand weights at home, and participating in classes such as Pilates or rika chi. She had a bone density test in 2024 and will provide the results.    Follow-up  The patient will follow up in 6 months.         Follow Up   No follow-ups on file.  Patient was given instructions and counseling regarding her condition or for health maintenance advice. Please see specific information pulled into the AVS if appropriate.  Patient or patient representative verbalized consent for the use of Ambient Listening during the visit with  Agata Miner MD for chart documentation. 3/21/2025  13:57 EDT

## 2025-03-21 NOTE — PATIENT INSTRUCTIONS
Problem List Items Addressed This Visit          Cardiac and Vasculature    Hypercholesterolemia    Overview   Taking pravastatin.         Relevant Medications    pravastatin (PRAVACHOL) 20 MG tablet    Other Relevant Orders    CBC & Differential (Completed)    Comprehensive Metabolic Panel (Completed)    Hemoglobin A1c (Completed)    Lipid Panel (Completed)    Microalbumin / Creatinine Urine Ratio - Urine, Clean Catch (Completed)    Urinalysis With Microscopic - Urine, Clean Catch (Completed)    TSH (Completed)    Homocysteine (Completed)    High Sensitivity CRP (Completed)    ECG 12 Lead       ENT    RESOLVED: Burning mouth syndrome    Relevant Orders    Sedimentation Rate (Completed)    Vitamin B12 (Completed)       Endocrine and Metabolic    Vitamin D deficiency    Overview   Continue vitamin D3.         Relevant Orders    Vitamin D,25-Hydroxy (Completed)    Homocysteinemia    Overview   Taking l-methylfolate daily.            Gastrointestinal Abdominal     GERD (gastroesophageal reflux disease)    Overview   Taking omeprazole daily.         Relevant Medications    hyoscyamine (LEVSIN) 0.125 MG SL tablet    Constipation by delayed colonic transit       Health Encounters    Annual physical exam    Medicare annual wellness visit, subsequent - Primary       Mental Health    Situational anxiety (Chronic)       Musculoskeletal and Injuries    Osteopenia of left forearm (Chronic)    Overview   DEXA 4/2022 showed the lowest T score in the osteopenia range in the left forearm at -1.2.  (Osteopenia is -1 or less.  Osteoporosis is -2.5 or less.)  Continue weight bearing exercises including walking and using small handweights and classes such as pilates or Aron chi.          Other Visit Diagnoses         Abnormal finding of blood chemistry, unspecified        Relevant Orders    Hemoglobin A1c (Completed)      Need for vaccination        Relevant Orders    Pneumococcal Conjugate Vaccine 20-Valent All (Completed)           Exercising to Stay Healthy  To become healthy and stay healthy, it is recommended that you do moderate-intensity and vigorous-intensity exercise. You can tell that you are exercising at a moderate intensity if your heart starts beating faster and you start breathing faster but can still hold a conversation. You can tell that you are exercising at a vigorous intensity if you are breathing much harder and faster and cannot hold a conversation while exercising.  How can exercise benefit me?  Exercising regularly is important. It has many health benefits, such as:  Improving overall fitness, flexibility, and endurance.  Increasing bone density.  Helping with weight control.  Decreasing body fat.  Increasing muscle strength and endurance.  Reducing stress and tension, anxiety, depression, or anger.  Improving overall health.  What guidelines should I follow while exercising?  Before you start a new exercise program, talk with your health care provider.  Do not exercise so much that you hurt yourself, feel dizzy, or get very short of breath.  Wear comfortable clothes and wear shoes with good support.  Drink plenty of water while you exercise to prevent dehydration or heat stroke.  Work out until your breathing and your heartbeat get faster (moderate intensity).  How often should I exercise?  Choose an activity that you enjoy, and set realistic goals. Your health care provider can help you make an activity plan that is individually designed and works best for you.  Exercise regularly as told by your health care provider. This may include:  Doing strength training two times a week, such as:  Lifting weights.  Using resistance bands.  Push-ups.  Sit-ups.  Yoga.  Doing a certain intensity of exercise for a given amount of time. Choose from these options:  A total of 150 minutes of moderate-intensity exercise every week.  A total of 75 minutes of vigorous-intensity exercise every week.  A mix of moderate-intensity and  vigorous-intensity exercise every week.  Children, pregnant women, people who have not exercised regularly, people who are overweight, and older adults may need to talk with a health care provider about what activities are safe to perform. If you have a medical condition, be sure to talk with your health care provider before you start a new exercise program.  What are some exercise ideas?  Moderate-intensity exercise ideas include:  Walking 1 mile (1.6 km) in about 15 minutes.  Biking.  Hiking.  Golfing.  Dancing.  Water aerobics.  Vigorous-intensity exercise ideas include:  Walking 4.5 miles (7.2 km) or more in about 1 hour.  Jogging or running 5 miles (8 km) in about 1 hour.  Biking 10 miles (16.1 km) or more in about 1 hour.  Lap swimming.  Roller-skating or in-line skating.  Cross-country skiing.  Vigorous competitive sports, such as football, basketball, and soccer.  Jumping rope.  Aerobic dancing.  What are some everyday activities that can help me get exercise?  Yard work, such as:  Pushing a .  Raking and bagging leaves.  Washing your car.  Pushing a stroller.  Shoveling snow.  Gardening.  Washing windows or floors.  How can I be more active in my day-to-day activities?  Use stairs instead of an elevator.  Take a walk during your lunch break.  If you drive, park your car farther away from your work or school.  If you take public transportation, get off one stop early and walk the rest of the way.  Stand up or walk around during all of your indoor phone calls.  Get up, stretch, and walk around every 30 minutes throughout the day.  Enjoy exercise with a friend. Support to continue exercising will help you keep a regular routine of activity.  Where to find more information  You can find more information about exercising to stay healthy from:  [place] Department of Health and Human Services: www.hhs.gov  Centers for Disease Control and Prevention (CDC): www.cdc.gov  Summary  Exercising regularly is  "important. It will improve your overall fitness, flexibility, and endurance.  Regular exercise will also improve your overall health. It can help you control your weight, reduce stress, and improve your bone density.  Do not exercise so much that you hurt yourself, feel dizzy, or get very short of breath.  Before you start a new exercise program, talk with your health care provider.  This information is not intended to replace advice given to you by your health care provider. Make sure you discuss any questions you have with your health care provider.  Document Revised: 04/15/2022 Document Reviewed: 04/15/2022  [employer] Patient Education © 2023 "[employer] BMI for Adults  Body mass index (BMI) is a number found using a person's weight and height. BMI can help tell how much of a person's weight is made up of fat. BMI does not measure body fat directly. It is used instead of tests that directly measure body fat, which can be difficult and expensive.  What are BMI measurements used for?  BMI is useful to:  Find out if your weight puts you at higher risk for medical problems.  Help recommend changes, such as in diet and exercise. This can help you reach a healthy weight. BMI screening can be done again to see if these changes are working.  How is BMI calculated?  Your height and weight are measured. The BMI is found from those numbers. This can be done with [place] or metric measurements. Note that charts and online BMI calculators are available to help you find your BMI quickly and easily without doing these calculations.  To calculate your BMI in [place] measurements:  Measure your weight in pounds (lb).  Multiply the number of pounds by 703.  So, for an adult who weighs 150 lb, multiply that number by 703: 150 x 703, which equals 105,450.  Measure your height in inches. Then multiply that number by itself to get a measurement called \"inches squared.\"  So, for an adult who is 70 inches tall, the \"inches squared\" " "measurement is 70 inches x 70 inches, which equals 4,900 inches squared.  Divide the total from step 2 (number of lb x 703) by the total from step 3 (inches squared): 105,450 ÷ 4,900 = 21.5. This is your BMI.  To calculate your BMI in metric measurements:    Measure your weight in kilograms (kg).  For this example, the weight is 70 kg.  Measure your height in meters (m). Then multiply that number by itself to get a measurement called \"meters squared.\"  So, for an adult who is 1.75 m tall, the \"meters squared\" measurement is 1.75 m x 1.75 m, which equals 3.1 meters squared.  Divide the number of kilograms (your weight) by the meters squared number. In this example: 70 ÷ 3.1 = 22.6. This is your BMI.  What do the results mean?  BMI charts are used to see if you are underweight, normal weight, overweight, or obese. The following guidelines will be used:  Underweight: BMI less than 18.5.  Normal weight: BMI between 18.5 and 24.9.  Overweight: BMI between 25 and 29.9.  Obese: BMI of 30 or above.  BMI is a tool and cannot diagnose a condition. Talk with your health care provider about what your BMI means for you. Keep these notes in mind:  Weight includes fat and muscle. Someone with a muscular build, such as an athlete, may have a BMI that is higher than 24.9. In cases like these, BMI is not a correct measure of body fat.  If you have a BMI of 25 or higher, your provider may need to do more testing to find out if excess body fat is the cause.  BMI is measured the same way for males and females. Females usually have more body fat than males of the same height and weight.  Where to find more information  For more information about BMI, including tools to quickly find your BMI, go to:  Centers for Disease Control and Prevention: cdc.gov  American Heart Association: heart.org  National Heart, Lung, and Blood [place]: nhlbi.nih.gov  This information is not intended to replace advice given to you by your health care " provider. Make sure you discuss any questions you have with your health care provider.  Document Revised: 09/07/2023 Document Reviewed: 08/31/2023  [employer] Patient Education © 2024 [employer]Heart-Healthy Eating Plan  Many factors influence your heart (coronary) health, including eating and exercise habits. Coronary risk increases with abnormal blood fat (lipid) levels. Heart-healthy meal planning includes limiting unhealthy fats, increasing healthy fats, and making other diet and lifestyle changes.  What is my plan?  Your health care provider may recommend that you:  Limit your fat intake to _________% or less of your total calories each day.  Limit your saturated fat intake to _________% or less of your total calories each day.  Limit the amount of cholesterol in your diet to less than _________ mg per day.  What are tips for following this plan?  Cooking  Cook foods using methods other than frying. Baking, boiling, grilling, and broiling are all good options. Other ways to reduce fat include:  Removing the skin from poultry.  Removing all visible fats from meats.  Steaming vegetables in water or broth.  Meal planning  A plate with examples of foods in a healthy diet.      At meals, imagine dividing your plate into fourths:  Fill one-half of your plate with vegetables and green salads.  Fill one-fourth of your plate with whole grains.  Fill one-fourth of your plate with lean protein foods.  Eat 4-5 servings of vegetables per day. One serving equals 1 cup raw or cooked vegetable, or 2 cups raw leafy greens.  Eat 4-5 servings of fruit per day. One serving equals 1 medium whole fruit, ¼ cup dried fruit, ½ cup fresh, frozen, or canned fruit, or ½ cup 100% fruit juice.  Eat more foods that contain soluble fiber. Examples include apples, broccoli, carrots, beans, peas, and barley. Aim to get 25-30 g of fiber per day.  Increase your consumption of legumes, nuts, and seeds to 4-5 servings per week. One serving of  dried beans or legumes equals ½ cup cooked, 1 serving of nuts is ¼ cup, and 1 serving of seeds equals 1 tablespoon.  Fats  Choose healthy fats more often. Choose monounsaturated and polyunsaturated fats, such as olive and canola oils, flaxseeds, walnuts, almonds, and seeds.  Eat more omega-3 fats. Choose salmon, mackerel, sardines, tuna, flaxseed oil, and ground flaxseeds. Aim to eat fish at least 2 times each week.  Check food labels carefully to identify foods with trans fats or high amounts of saturated fat.  Limit saturated fats. These are found in animal products, such as meats, butter, and cream. Plant sources of saturated fats include palm oil, palm kernel oil, and coconut oil.  Avoid foods with partially hydrogenated oils in them. These contain trans fats. Examples are stick margarine, some tub margarines, cookies, crackers, and other baked goods.  Avoid fried foods.  General information  Eat more home-cooked food and less restaurant, buffet, and fast food.  Limit or avoid alcohol.  Limit foods that are high in starch and sugar.  Lose weight if you are overweight. Losing just 5-10% of your body weight can help your overall health and prevent diseases such as diabetes and heart disease.  Monitor your salt (sodium) intake, especially if you have high blood pressure. Talk with your health care provider about your sodium intake.  Try to incorporate more vegetarian meals weekly.  What foods can I eat?  Fruits  All fresh, canned (in natural juice), or frozen fruits.  Vegetables  Fresh or frozen vegetables (raw, steamed, roasted, or grilled). Green salads.  Grains  Most grains. Choose whole wheat and whole grains most of the time. Rice and pasta, including brown rice and pastas made with whole wheat.  Meats and other proteins  Lean, well-trimmed beef, veal, pork, and lamb. Chicken and turkey without skin. All fish and shellfish. Wild duck, rabbit, pheasant, and venison. Egg whites or low-cholesterol egg  substitutes. Dried beans, peas, lentils, and tofu. Seeds and most nuts.  Dairy  Low-fat or nonfat cheeses, including ricotta and mozzarella. Skim or 1% milk (liquid, powdered, or evaporated). Buttermilk made with low-fat milk. Nonfat or low-fat yogurt.  Fats and oils  Non-hydrogenated (trans-free) margarines. Vegetable oils, including soybean, sesame, sunflower, olive, peanut, safflower, corn, canola, and cottonseed. Salad dressings or mayonnaise made with a vegetable oil.  Beverages  Water (mineral or sparkling). Coffee and tea. Diet carbonated beverages.  Sweets and desserts  Sherbet, gelatin, and fruit ice. Small amounts of dark chocolate.  Limit all sweets and desserts.  Seasonings and condiments  All seasonings and condiments.  The items listed above may not be a complete list of foods and beverages you can eat. Contact a dietitian for more options.  What foods are not recommended?  Fruits  Canned fruit in heavy syrup. Fruit in cream or butter sauce. Fried fruit. Limit coconut.  Vegetables  Vegetables cooked in cheese, cream, or butter sauce. Fried vegetables.  Grains  Breads made with saturated or trans fats, oils, or whole milk. Croissants. Sweet rolls. Donuts. High-fat crackers, such as cheese crackers.  Meats and other proteins  Fatty meats, such as hot dogs, ribs, sausage, reed, rib-eye roast or steak. High-fat deli meats, such as salami and bologna. Caviar. Domestic duck and goose. Organ meats, such as liver.  Dairy  Cream, sour cream, cream cheese, and creamed cottage cheese. Whole-milk cheeses. Whole or 2% milk (liquid, evaporated, or condensed). Whole buttermilk. Cream sauce or high-fat cheese sauce. Whole-milk yogurt.  Fats and oils  Meat fat, or shortening. Cocoa butter, hydrogenated oils, palm oil, coconut oil, palm kernel oil. Solid fats and shortenings, including reed fat, salt pork, lard, and butter. Nondairy cream substitutes. Salad dressings with cheese or sour cream.  Beverages  Regular  sodas and any drinks with added sugar.  Sweets and desserts  Frosting. Pudding. Cookies. Cakes. Pies. Milk chocolate or white chocolate. Buttered syrups. Full-fat ice cream or ice cream drinks.  The items listed above may not be a complete list of foods and beverages to avoid. Contact a dietitian for more information.  Summary  Heart-healthy meal planning includes limiting unhealthy fats, increasing healthy fats, and making other diet and lifestyle changes.  Lose weight if you are overweight. Losing just 5-10% of your body weight can help your overall health and prevent diseases such as diabetes and heart disease.  Focus on eating a balance of foods, including fruits and vegetables, low-fat or nonfat dairy, lean protein, nuts and legumes, whole grains, and heart-healthy oils and fats.  This information is not intended to replace advice given to you by your health care provider. Make sure you discuss any questions you have with your health care provider.  Document Revised: 04/28/2022 Document Reviewed: 04/28/2022  [employer] Patient Education © 2022 [employer] Inc.

## 2025-04-08 RX ORDER — OSELTAMIVIR PHOSPHATE 75 MG/1
75 CAPSULE ORAL 2 TIMES DAILY
Qty: 10 CAPSULE | Refills: 0 | Status: SHIPPED | OUTPATIENT
Start: 2025-04-08 | End: 2025-04-13

## 2025-04-08 RX ORDER — OSELTAMIVIR PHOSPHATE 75 MG/1
75 CAPSULE ORAL DAILY
Qty: 5 CAPSULE | Refills: 0 | Status: SHIPPED | OUTPATIENT
Start: 2025-04-08 | End: 2025-04-08 | Stop reason: SDUPTHER

## 2025-07-15 DIAGNOSIS — N95.2 ATROPHIC VAGINITIS: ICD-10-CM

## 2025-07-15 RX ORDER — ESTRADIOL 10 UG/1
TABLET, FILM COATED VAGINAL
Qty: 18 TABLET | Refills: 3 | Status: SHIPPED | OUTPATIENT
Start: 2025-07-15

## 2025-07-15 NOTE — TELEPHONE ENCOUNTER
Rx Refill Note  Requested Prescriptions     Pending Prescriptions Disp Refills    estradiol (VAGIFEM) 10 MCG tablet vaginal tablet [Pharmacy Med Name: ESTRADIOL 10MCG VAGINAL INS 10 Tablet] 18 tablet 3     Sig: INSERT 1 TABLET INTO THE VAGINA 2-3 TIMES WEEKLY      Last office visit with prescribing clinician: 3/21/2025   Last telemedicine visit with prescribing clinician: Visit date not found   Next office visit with prescribing clinician: 10/6/2025                         Would you like a call back once the refill request has been completed: [] Yes [] No    If the office needs to give you a call back, can they leave a voicemail: [] Yes [] No    Lindy Yusuf MA  07/15/25, 13:43 EDT